# Patient Record
Sex: FEMALE | Race: BLACK OR AFRICAN AMERICAN | NOT HISPANIC OR LATINO | Employment: FULL TIME | URBAN - METROPOLITAN AREA
[De-identification: names, ages, dates, MRNs, and addresses within clinical notes are randomized per-mention and may not be internally consistent; named-entity substitution may affect disease eponyms.]

---

## 2017-03-17 ENCOUNTER — TELEPHONE (OUTPATIENT)
Dept: PSYCHIATRY | Facility: CLINIC | Age: 42
End: 2017-03-17

## 2017-03-17 NOTE — TELEPHONE ENCOUNTER
----- Message from Radha Edouard sent at 3/17/2017  9:44 AM CDT -----  Contact: Pt   Pt is calling requesting NP appt  W/ Dr Cohen  Pt  #  [phone]

## 2017-06-05 ENCOUNTER — OFFICE VISIT (OUTPATIENT)
Dept: FAMILY MEDICINE | Facility: CLINIC | Age: 42
End: 2017-06-05
Payer: COMMERCIAL

## 2017-06-05 ENCOUNTER — APPOINTMENT (OUTPATIENT)
Dept: RADIOLOGY | Facility: HOSPITAL | Age: 42
End: 2017-06-05
Attending: INTERNAL MEDICINE
Payer: COMMERCIAL

## 2017-06-05 VITALS
DIASTOLIC BLOOD PRESSURE: 94 MMHG | RESPIRATION RATE: 17 BRPM | BODY MASS INDEX: 30.11 KG/M2 | OXYGEN SATURATION: 99 % | HEART RATE: 81 BPM | WEIGHT: 187.38 LBS | TEMPERATURE: 99 F | HEIGHT: 66 IN | SYSTOLIC BLOOD PRESSURE: 144 MMHG

## 2017-06-05 DIAGNOSIS — G47.00 INSOMNIA, UNSPECIFIED TYPE: ICD-10-CM

## 2017-06-05 DIAGNOSIS — M79.642 PAIN IN BOTH HANDS: ICD-10-CM

## 2017-06-05 DIAGNOSIS — I10 HYPERTENSION, ESSENTIAL: ICD-10-CM

## 2017-06-05 DIAGNOSIS — M79.641 PAIN IN BOTH HANDS: ICD-10-CM

## 2017-06-05 DIAGNOSIS — M79.642 PAIN IN BOTH HANDS: Primary | ICD-10-CM

## 2017-06-05 DIAGNOSIS — M79.641 PAIN IN BOTH HANDS: Primary | ICD-10-CM

## 2017-06-05 PROCEDURE — 99999 PR PBB SHADOW E&M-EST. PATIENT-LVL III: CPT | Mod: PBBFAC,,, | Performed by: INTERNAL MEDICINE

## 2017-06-05 PROCEDURE — 73130 X-RAY EXAM OF HAND: CPT | Mod: 26,50,, | Performed by: RADIOLOGY

## 2017-06-05 PROCEDURE — 73130 X-RAY EXAM OF HAND: CPT | Mod: 50,TC,PN

## 2017-06-05 PROCEDURE — 99214 OFFICE O/P EST MOD 30 MIN: CPT | Mod: S$GLB,,, | Performed by: INTERNAL MEDICINE

## 2017-06-05 RX ORDER — MIRTAZAPINE 15 MG/1
15 TABLET, FILM COATED ORAL NIGHTLY
Qty: 30 TABLET | Refills: 2 | Status: SHIPPED | OUTPATIENT
Start: 2017-06-05 | End: 2018-08-17 | Stop reason: SDUPTHER

## 2017-06-05 RX ORDER — NAPROXEN 500 MG/1
500 TABLET ORAL DAILY PRN
Qty: 60 TABLET | Refills: 0 | Status: SHIPPED | OUTPATIENT
Start: 2017-06-05 | End: 2019-05-10

## 2017-06-05 NOTE — LETTER
June 5, 2017    Navjot Segovia  [address]  [place] [address]         [facility]  [address]  [place] [address]  Phone: [phone] June 5, 2017     Patient: Navjot Segovia   YOB: 1975   Date of Visit: 6/5/2017       To Whom It May Concern:    It is my medical opinion that Navjot Segovia may return to work on Tuesday June 6 2017.    If you have any questions or concerns, please don't hesitate to call.    Sincerely,        Henrik Vazquez MD

## 2017-06-05 NOTE — PROGRESS NOTES
"Subjective:       Patient ID: Navjot Segovia is a 42 y.o. female.    Chief Complaint: Arthritis (discuss ); Hand Pain (bilateral - 5th digit ); Foot Pain (left 1st & 2nd digit ); and Medication Management (Discuss Coreg )    Pain in bilateral fifth fingers.    HPI: 41 y/o with depression presents to discuss multiple chronic issues reports several month history of pain to distal fifth fingers bilaterally started on left first but now both. Worse with typing at work no parathesia no involvement of other joints no morning stiffness. She does endorse increase irritability and being more "short fused" with co workers, early a.m. Awakening, easy tearfulness. Has used effexor and trazadone in past for mood both with adverse side effects.       Review of Systems   Constitutional: Negative for activity change, appetite change, fatigue, fever and unexpected weight change.   HENT: Negative for ear pain, rhinorrhea and sore throat.    Eyes: Negative for discharge and visual disturbance.   Respiratory: Negative for chest tightness, shortness of breath and wheezing.    Cardiovascular: Negative for chest pain, palpitations and leg swelling.   Gastrointestinal: Negative for abdominal pain, constipation and diarrhea.   Endocrine: Negative for cold intolerance and heat intolerance.   Genitourinary: Negative for dysuria and hematuria.   Musculoskeletal: Positive for arthralgias. Negative for joint swelling and neck stiffness.   Skin: Negative for rash.   Neurological: Negative for dizziness, syncope, weakness and headaches.   Psychiatric/Behavioral: Positive for agitation, dysphoric mood and sleep disturbance. Negative for suicidal ideas. The patient is nervous/anxious.        Objective:     Vitals:    06/05/17 1551   BP: (!) 144/94   BP Location: Left arm   Patient Position: Sitting   BP Method: Manual   Pulse: 81   Resp: 17   Temp: 99.1 °F (37.3 °C)   TempSrc: Oral   SpO2: 99%   Weight: 85 kg (187 lb 6.3 oz)   Height: 5' 6" " (1.676 m)          Physical Exam   Constitutional: She is oriented to person, place, and time. She appears well-developed and well-nourished.   HENT:   Head: Normocephalic and atraumatic.   Eyes: Conjunctivae are normal. Pupils are equal, round, and reactive to light.   Neck: Normal range of motion. No thyromegaly present.   Cardiovascular: Normal rate and regular rhythm.  Exam reveals no gallop and no friction rub.    No murmur heard.  No LE edema   Pulmonary/Chest: Effort normal and breath sounds normal. She has no wheezes. She has no rales.   Abdominal: Soft. Bowel sounds are normal. There is no tenderness. There is no rebound and no guarding.   Musculoskeletal: Normal range of motion. She exhibits no edema or tenderness.   No palpable nodules of wrists, elbows or hands bilaterally no significant synovitis of any digits no palpable nodules at location of reported fifth finger pain   Lymphadenopathy:     She has no cervical adenopathy.   Neurological: She is alert and oriented to person, place, and time. No cranial nerve deficit.   Skin: Skin is warm and dry.   Psychiatric: She has a normal mood and affect.       Assessment:       1. Pain in both hands    2. Insomnia, unspecified type    3. Hypertension, essential        Plan:    1. Chronic suspect some component of OA, no evidence of synovitis on exam check plain films for systemic joint errosin, crp and esr to gauge inflammatory component tram and ccp to screen for SLE and RA given multiple small joint involvement     2. With depressive symptoms trial low dose remeron    3. Above goal in setting of pain will montior at close follow up

## 2017-06-06 ENCOUNTER — LAB VISIT (OUTPATIENT)
Dept: LAB | Facility: HOSPITAL | Age: 42
End: 2017-06-06
Attending: INTERNAL MEDICINE
Payer: COMMERCIAL

## 2017-06-06 DIAGNOSIS — M79.642 PAIN IN BOTH HANDS: ICD-10-CM

## 2017-06-06 DIAGNOSIS — M79.641 PAIN IN BOTH HANDS: ICD-10-CM

## 2017-06-06 DIAGNOSIS — I10 HYPERTENSION, ESSENTIAL: ICD-10-CM

## 2017-06-06 LAB
ALBUMIN SERPL BCP-MCNC: 3.9 G/DL
ALP SERPL-CCNC: 20 U/L
ALT SERPL W/O P-5'-P-CCNC: 34 U/L
ANION GAP SERPL CALC-SCNC: 7 MMOL/L
AST SERPL-CCNC: 23 U/L
BASOPHILS # BLD AUTO: 0.01 K/UL
BASOPHILS NFR BLD: 0.3 %
BILIRUB SERPL-MCNC: 0.7 MG/DL
BUN SERPL-MCNC: 11 MG/DL
CALCIUM SERPL-MCNC: 9.2 MG/DL
CCP AB SER IA-ACNC: <0.5 U/ML
CHLORIDE SERPL-SCNC: 106 MMOL/L
CO2 SERPL-SCNC: 27 MMOL/L
CREAT SERPL-MCNC: 1 MG/DL
CRP SERPL-MCNC: 0.6 MG/L
DIFFERENTIAL METHOD: ABNORMAL
EOSINOPHIL # BLD AUTO: 0.1 K/UL
EOSINOPHIL NFR BLD: 1.6 %
ERYTHROCYTE [DISTWIDTH] IN BLOOD BY AUTOMATED COUNT: 12.8 %
ERYTHROCYTE [SEDIMENTATION RATE] IN BLOOD BY WESTERGREN METHOD: 8 MM/HR
EST. GFR  (AFRICAN AMERICAN): >60 ML/MIN/1.73 M^2
EST. GFR  (NON AFRICAN AMERICAN): >60 ML/MIN/1.73 M^2
GLUCOSE SERPL-MCNC: 96 MG/DL
HCT VFR BLD AUTO: 40.3 %
HGB BLD-MCNC: 14.5 G/DL
LYMPHOCYTES # BLD AUTO: 1.5 K/UL
LYMPHOCYTES NFR BLD: 40.2 %
MCH RBC QN AUTO: 32.2 PG
MCHC RBC AUTO-ENTMCNC: 36 %
MCV RBC AUTO: 89 FL
MONOCYTES # BLD AUTO: 0.5 K/UL
MONOCYTES NFR BLD: 13.2 %
NEUTROPHILS # BLD AUTO: 1.7 K/UL
NEUTROPHILS NFR BLD: 44.4 %
PLATELET # BLD AUTO: 288 K/UL
PMV BLD AUTO: 9.8 FL
POTASSIUM SERPL-SCNC: 4 MMOL/L
PROT SERPL-MCNC: 7.2 G/DL
RBC # BLD AUTO: 4.51 M/UL
SODIUM SERPL-SCNC: 140 MMOL/L
TSH SERPL DL<=0.005 MIU/L-ACNC: 1.2 UIU/ML
URATE SERPL-MCNC: 5 MG/DL
WBC # BLD AUTO: 3.78 K/UL

## 2017-06-06 PROCEDURE — 84550 ASSAY OF BLOOD/URIC ACID: CPT

## 2017-06-06 PROCEDURE — 86038 ANTINUCLEAR ANTIBODIES: CPT

## 2017-06-06 PROCEDURE — 85651 RBC SED RATE NONAUTOMATED: CPT

## 2017-06-06 PROCEDURE — 80053 COMPREHEN METABOLIC PANEL: CPT

## 2017-06-06 PROCEDURE — 36415 COLL VENOUS BLD VENIPUNCTURE: CPT

## 2017-06-06 PROCEDURE — 84443 ASSAY THYROID STIM HORMONE: CPT

## 2017-06-06 PROCEDURE — 86200 CCP ANTIBODY: CPT

## 2017-06-06 PROCEDURE — 86140 C-REACTIVE PROTEIN: CPT

## 2017-06-06 PROCEDURE — 85025 COMPLETE CBC W/AUTO DIFF WBC: CPT

## 2017-06-07 LAB — ANA SER QL IF: NORMAL

## 2017-06-19 ENCOUNTER — OFFICE VISIT (OUTPATIENT)
Dept: FAMILY MEDICINE | Facility: CLINIC | Age: 42
End: 2017-06-19
Payer: COMMERCIAL

## 2017-06-19 VITALS
WEIGHT: 188.63 LBS | DIASTOLIC BLOOD PRESSURE: 96 MMHG | BODY MASS INDEX: 30.31 KG/M2 | TEMPERATURE: 99 F | HEART RATE: 92 BPM | SYSTOLIC BLOOD PRESSURE: 152 MMHG | RESPIRATION RATE: 20 BRPM | HEIGHT: 66 IN | OXYGEN SATURATION: 97 %

## 2017-06-19 DIAGNOSIS — I10 HYPERTENSION, ESSENTIAL: Primary | ICD-10-CM

## 2017-06-19 PROCEDURE — 99999 PR PBB SHADOW E&M-EST. PATIENT-LVL III: CPT | Mod: PBBFAC,,, | Performed by: INTERNAL MEDICINE

## 2017-06-19 PROCEDURE — 99214 OFFICE O/P EST MOD 30 MIN: CPT | Mod: S$GLB,,, | Performed by: INTERNAL MEDICINE

## 2017-06-19 RX ORDER — TRIAMTERENE AND HYDROCHLOROTHIAZIDE 37.5; 25 MG/1; MG/1
1 CAPSULE ORAL EVERY MORNING
Qty: 30 CAPSULE | Refills: 11 | Status: SHIPPED | OUTPATIENT
Start: 2017-06-19 | End: 2018-08-17

## 2017-06-19 RX ORDER — AMLODIPINE BESYLATE 10 MG/1
TABLET ORAL
Qty: 30 TABLET | Refills: 3 | Status: SHIPPED | OUTPATIENT
Start: 2017-06-19 | End: 2017-10-02 | Stop reason: SDUPTHER

## 2017-06-19 NOTE — PROGRESS NOTES
"Subjective:       Patient ID: Navjot Segovia is a 42 y.o. female.    Chief Complaint: Chronic Pain; Hand Pain; and Follow-up    F/u hand pain    HPI: 41 y/o w/ HTN here to follow up hand pain. Inflammatory markes in normal range screening for RA and SLE with CCP and OMAR negative, no significant anemai or evidence of renal disease, thyroid normal. Does report significant improvement iwtn nsaid use now taking three to four times per week. bp still abvoe goal no LE swelling no CP no orthopna or PND      Review of Systems   Constitutional: Negative for activity change, appetite change, fatigue, fever and unexpected weight change.   HENT: Negative for ear pain, rhinorrhea and sore throat.    Eyes: Negative for discharge and visual disturbance.   Respiratory: Negative for chest tightness, shortness of breath and wheezing.    Cardiovascular: Negative for chest pain, palpitations and leg swelling.   Gastrointestinal: Negative for abdominal pain, constipation and diarrhea.   Endocrine: Negative for cold intolerance and heat intolerance.   Genitourinary: Negative for dysuria and hematuria.   Musculoskeletal: Negative for joint swelling and neck stiffness.   Skin: Negative for rash.   Neurological: Negative for dizziness, syncope, weakness and headaches.   Psychiatric/Behavioral: Negative for suicidal ideas.       Objective:     Vitals:    06/19/17 1502 06/19/17 1525   BP: (!) 162/96 (!) 152/96   Pulse: 92    Resp: 20    Temp: 98.5 °F (36.9 °C)    TempSrc: Oral    SpO2: 97%    Weight: 85.6 kg (188 lb 9.7 oz)    Height: 5' 6" (1.676 m)           Physical Exam   Constitutional: She is oriented to person, place, and time. She appears well-developed and well-nourished.   HENT:   Head: Normocephalic and atraumatic.   Eyes: Conjunctivae are normal. Pupils are equal, round, and reactive to light.   Neck: Normal range of motion.   Cardiovascular: Normal rate and regular rhythm.  Exam reveals no gallop and no friction rub.    No " murmur heard.  Pulmonary/Chest: Effort normal and breath sounds normal. She has no wheezes. She has no rales.   Abdominal: Soft. Bowel sounds are normal. There is no tenderness. There is no rebound and no guarding.   Musculoskeletal: Normal range of motion. She exhibits no edema or tenderness.   Neurological: She is alert and oriented to person, place, and time. No cranial nerve deficit.   Skin: Skin is warm and dry.   Psychiatric: She has a normal mood and affect.       Assessment:       1. Hypertension, essential        Plan:    add hctz/trimeteren (K+ has been borderline low in past) BP check in two weeks

## 2017-06-29 ENCOUNTER — OFFICE VISIT (OUTPATIENT)
Dept: PSYCHIATRY | Facility: CLINIC | Age: 42
End: 2017-06-29
Payer: COMMERCIAL

## 2017-06-29 VITALS
DIASTOLIC BLOOD PRESSURE: 86 MMHG | BODY MASS INDEX: 30.39 KG/M2 | HEIGHT: 66 IN | WEIGHT: 189.06 LBS | HEART RATE: 96 BPM | SYSTOLIC BLOOD PRESSURE: 142 MMHG

## 2017-06-29 DIAGNOSIS — F33.0 MAJOR DEPRESSIVE DISORDER, RECURRENT, MILD: Primary | ICD-10-CM

## 2017-06-29 DIAGNOSIS — F41.1 GENERALIZED ANXIETY DISORDER: ICD-10-CM

## 2017-06-29 PROCEDURE — 99204 OFFICE O/P NEW MOD 45 MIN: CPT | Mod: S$GLB,,, | Performed by: PSYCHIATRY & NEUROLOGY

## 2017-06-29 PROCEDURE — 99999 PR PBB SHADOW E&M-EST. PATIENT-LVL III: CPT | Mod: PBBFAC,,, | Performed by: PSYCHIATRY & NEUROLOGY

## 2017-06-29 RX ORDER — DULOXETIN HYDROCHLORIDE 30 MG/1
30 CAPSULE, DELAYED RELEASE ORAL DAILY
Qty: 30 CAPSULE | Refills: 1 | Status: SHIPPED | OUTPATIENT
Start: 2017-06-29 | End: 2019-02-18

## 2017-06-29 RX ORDER — FLUOCINONIDE TOPICAL SOLUTION USP, 0.05% 0.5 MG/ML
SOLUTION TOPICAL
Refills: 4 | COMMUNITY
Start: 2017-06-16

## 2017-06-29 NOTE — PROGRESS NOTES
"Outpatient Psychiatry Initial Visit (MD/NP)    6/29/2017    Navjot Segovia, a 42 y.o. female, presenting for initial evaluation visit. Met with patient.    Reason for Encounter: self-referral. Patient complains of No chief complaint on file.  .    History of Present Illness: Patient Navjot Segovia presents to clinic for her initial psychiatric evaluation.  She brought herself to clinic for problems with anxiety and stress for about the past 6-7 months.  She says that she has been feeling "all weird in the head".  She did see a therapist and psychiatrist in 2013 for anxiety related chest pains after a bad divorce but never followed up.  Since then she has done well with medication and spiritual enlightenment.  Her PCP became concerned and started her on Remeron 2 weeks ago which has helped with sleep.  She describes her depression as not necessarily being depressed but can cry sometimes, especially during her menstrual cycle.  She has trouble staying asleep because she is always thinking about things.  She is eating more and gaining weight.  She has no suicidal thoughts or plans.  She describes her anxiety as being a worrisome person who worries about things that she should not have to worry about.  She fixates on things and makes them worse than what they are.  She says that she can gravitate towards negativity.  "My brain is going."  She has no history of panic attacks.  She has no history of vitor or psychosis.  Incidentally, she has also noted she strains arthritic pains in her fingers and toes over the past couple of months.  Medical workup thus far has been negative.  Most of her stress comes from the workplace.    Previously she has been on trazodone (nightmares), Effexor XR (nausea and sleepiness), Klonopin (worked), Prozac (did not like).    Review Of Systems:     GENERAL:  No weight gain or loss  HEAD:  No headaches  CHEST:  No shortness of breath, hyperventilation or cough  CARDIOVASCULAR:  No " tachycardia or chest pain  ABDOMEN:  No nausea, vomiting, pain, constipation or diarrhea  MUSCULOSKELETAL:  No pain or stiffness of the joints  NEUROLOGIC:  No weakness, sensory changes, seizures, confusion, memory loss, tremor or other abnormal movements  Pertinent items are noted in HPI.      Current Evaluation:     Nutritional Screening: Considering the patient's height and weight, medications, medical history and preferences, should a referral be made to the dietitian? no    Constitutional  Vitals:  Most recent vital signs, dated less than 90 days prior to this appointment, were reviewed.  There were no vitals filed for this visit.     General:  unremarkable, age appropriate     Musculoskeletal  Muscle Strength/Tone:  no tremor, no tic   Gait & Station:  non-ataxic     Psychiatric  Speech:  no latency; no press   Mood & Affect:  anxious, dysthymic  congruent and appropriate   Thought Process:  normal and logical   Associations:  intact   Thought Content:  normal, no suicidality, no homicidality, delusions, or paranoia   Insight:  limited awareness of illness   Judgement: behavior is adequate to circumstances   Orientation:  person, place, situation, time/date   Memory: intact for content of interview   Language: grossly intact   Attention Span & Concentration:  able to focus   Fund of Knowledge:  intact and appropriate to age and level of education       Relevant Elements of Neurological Exam: normal gait    Functioning in Relationships:  Spouse/partner: Good relationship with fiancé  Peers: Good  Employers: Good    Laboratory Data  Lab Visit on 06/06/2017   Component Date Value Ref Range Status    WBC 06/06/2017 3.78* 3.90 - 12.70 K/uL Final    RBC 06/06/2017 4.51  4.00 - 5.40 M/uL Final    Hemoglobin 06/06/2017 14.5  12.0 - 16.0 g/dL Final    Hematocrit 06/06/2017 40.3  37.0 - 48.5 % Final    MCV 06/06/2017 89  82 - 98 fL Final    MCH 06/06/2017 32.2* 27.0 - 31.0 pg Final    MCHC 06/06/2017 36.0  32.0 -  36.0 % Final    RDW 06/06/2017 12.8  11.5 - 14.5 % Final    Platelets 06/06/2017 288  150 - 350 K/uL Final    MPV 06/06/2017 9.8  9.2 - 12.9 fL Final    Gran # 06/06/2017 1.7* 1.8 - 7.7 K/uL Final    Lymph # 06/06/2017 1.5  1.0 - 4.8 K/uL Final    Mono # 06/06/2017 0.5  0.3 - 1.0 K/uL Final    Eos # 06/06/2017 0.1  0.0 - 0.5 K/uL Final    Baso # 06/06/2017 0.01  0.00 - 0.20 K/uL Final    Gran% 06/06/2017 44.4  38.0 - 73.0 % Final    Lymph% 06/06/2017 40.2  18.0 - 48.0 % Final    Mono% 06/06/2017 13.2  4.0 - 15.0 % Final    Eosinophil% 06/06/2017 1.6  0.0 - 8.0 % Final    Basophil% 06/06/2017 0.3  0.0 - 1.9 % Final    Differential Method 06/06/2017 Automated   Final    Sodium 06/06/2017 140  136 - 145 mmol/L Final    Potassium 06/06/2017 4.0  3.5 - 5.1 mmol/L Final    Chloride 06/06/2017 106  95 - 110 mmol/L Final    CO2 06/06/2017 27  23 - 29 mmol/L Final    Glucose 06/06/2017 96  70 - 110 mg/dL Final    BUN, Bld 06/06/2017 11  6 - 20 mg/dL Final    Creatinine 06/06/2017 1.0  0.5 - 1.4 mg/dL Final    Calcium 06/06/2017 9.2  8.7 - 10.5 mg/dL Final    Total Protein 06/06/2017 7.2  6.0 - 8.4 g/dL Final    Albumin 06/06/2017 3.9  3.5 - 5.2 g/dL Final    Total Bilirubin 06/06/2017 0.7  0.1 - 1.0 mg/dL Final    Alkaline Phosphatase 06/06/2017 20* 55 - 135 U/L Final    AST 06/06/2017 23  10 - 40 U/L Final    ALT 06/06/2017 34  10 - 44 U/L Final    Anion Gap 06/06/2017 7* 8 - 16 mmol/L Final    eGFR if African American 06/06/2017 >60  >60 mL/min/1.73 m^2 Final    eGFR if non African American 06/06/2017 >60  >60 mL/min/1.73 m^2 Final    TSH 06/06/2017 1.197  0.400 - 4.000 uIU/mL Final    CRP 06/06/2017 0.6  0.0 - 8.2 mg/L Final    Sed Rate 06/06/2017 8  0 - 20 mm/Hr Final    CCP Antibodies 06/06/2017 <0.5  <5.0 U/mL Final    OMAR Screen 06/07/2017 Negative <1:160  Negative <1:160 Final    Uric Acid 06/06/2017 5.0  2.4 - 5.7 mg/dL Final         Medications  Outpatient Encounter  Prescriptions as of 6/29/2017   Medication Sig Dispense Refill    amlodipine (NORVASC) 10 MG tablet TAKE 1 TABLET (10 MG TOTAL) BY MOUTH ONCE DAILY. 30 tablet 3    carvedilol (COREG) 12.5 MG tablet Take 1 tablet (12.5 mg total) by mouth 2 (two) times daily with meals. (Patient taking differently: Take 12.5 mg by mouth every evening. ) 60 tablet 11    mirtazapine (REMERON) 15 MG tablet Take 1 tablet (15 mg total) by mouth every evening. 30 tablet 2    multivitamin with minerals tablet Take 1 tablet by mouth once daily.      naproxen (NAPROSYN) 500 MG tablet Take 1 tablet (500 mg total) by mouth daily as needed. 60 tablet 0    triamterene-hydrochlorothiazide 37.5-25 mg (DYAZIDE) 37.5-25 mg per capsule Take 1 capsule by mouth every morning. 30 capsule 11    valsartan (DIOVAN) 320 MG tablet Take 1 tablet (320 mg total) by mouth once daily. 30 tablet 5     No facility-administered encounter medications on file as of 6/29/2017.            Assessment - Diagnosis - Goals:     Impression: We will continue pharmacological intervention and adjunctive therapy.       ICD-10-CM ICD-9-CM   1. Major depressive disorder, recurrent, mild F33.0 296.31   2. Generalized anxiety disorder F41.1 300.02       Strengths and Liabilities: Strength: Patient is motivated for change., Liability: Patient lacks coping skills.    Treatment Goals:  Specify outcomes written in observable, behavioral terms:   Anxiety: reducing negative automatic thoughts and reducing physical symptoms of anxiety  Depression: increasing energy, increasing interest in usual activities, increasing motivation and reducing negative automatic thoughts    Treatment Plan/Recommendations:   · Medication Management: Continue current medications. The risks and benefits of medication were discussed with the patient.  · The treatment plan and follow up plan were reviewed with the patient.  1.  Continue Remeron 7.5 mg nightly targeting sleep, depression, anxiety.  Warned of  risk of vitor, suicidality, serotonin syndrome, weight gain, oversedation.  2.  Start Cymbalta 30 mg by mouth daily.  Targeting depression, anxiety, chronic pains.  Warned of risk of vitor, suicidality, serotonin syndrome.  3.  If she continues to gain weight or considered weight gain from Remeron, will change to doxepin or amitriptyline.  4.  Asked patient to get set up with EAP therapist.    Return to Clinic: 6 weeks, as needed    Counseling time: 40 minutes  Total time: 70 minutes  Consulting clinician was informed of the encounter and consult note.

## 2017-06-29 NOTE — PATIENT INSTRUCTIONS
[facility] - DEPARTMENT OF PSYCHIATRY   NEW PATIENT ORIENTATION INFORMATION  OUTPATIENT SERVICES COUNSELING CONTRACT    We appreciate the opportunity to participate in your medical care and hope the following protocols will make it easier for you to receive quality treatment in our department.    1. PUNCTUALITY: Your appointment is scheduled for a fixed amount of time reserved especially for you.  To get the benefit of your appointment, please arrive early enough to allow time for parking and registration.  If you are late for your appointment, your clinician is not able to offer additional time.  Please make every effort to be on time.      2. PAYMENT FOR SERVICES:   Payments are expected at the time of service.  Please contact Deandra Lazo at [phone] if you need to resolve issues involving your account at [facility] or to set up a payment plan.    3. CANCELLATION / MISSED APPOINTMENTS:   In order to receive quality care, all appointments must be kept.  Appointment may be cancelled, ONLY by talking with an  at phone number [phone], between 8:00 a.m. and 5:00 p.m., Monday through Friday, at least 24 hours before your appointment time.  Your clinician reserves this time specifically for you, and if you will be unable to use it, it is necessary that you cancel in a timely manner.  If you do not give at least 24-hour notice of cancellation a full fee will be assessed.  Please note that insurance does not cover no-show charges, so you will be billed directly.  If you are consistently late, cancel, or do not show for your appointments, our department reserves the right to terminate treatment    4. CALLING THE DEPARTMENT:   MESSAGES, SCHEDULE OR CANCEL APPOINTMENTS- In general you can reach the department by calling [phone], between 8:00 a.m. and 5:00 p.m., Monday through Friday, to schedule or cancel appointments or leave a message for your clinician.  It is advisable  to schedule your visits far in advance to obtain the most convenient times for your appointments.   AFTER HOURS, WEEKEND OR HOLIDAYS- For urgent questions after hours, weekends and holidays, calling the department number [phone] will connect you to the nursing staff on the Psychiatry Inpatient Unit.  The nursing staff will speak with you and contact the On Call psychiatrist if necessary.   EMERGENCY-  In case of a crisis when there is a concern of harm to self or others, call 911 or the office [phone] between 8:00 a.m. and 5:00 p.m., Monday through Friday.  After hours, weekends or holidays, please call 911 or go to the Emergency Department where you can be thoroughly evaluated by the On Call psychiatrist.  If possible, contact the psychiatrist on call at [phone] prior to leaving for the [facility] Emergency Department.    5. TEAM APPROACH:  Most patients receive therapy through our team system.  In the team system, your primary therapist will be a , psychologist, psychiatry resident or psychiatrist.  If your therapist is a , psychologist or psychiatry resident, please contact your primary therapist first in matters other than medications or acute medical problems.    6. PRESCRIPTION REFILLS:  Prescription refills must be done at your physician office visit.  You will be given a sufficient number of refills to last one extra month beyond your next appointment.  No additional refills will be approved beyond the original treatment plan.  After hours, sufficient medication may be approved to last until the next scheduled appointment. After hours requests for refills on controlled substances may be declined by the psychiatrist on call, as he or she may not be familiar with your case  Please work closely with your doctor so that you have sufficient medication until your next appointment.  Again, please note that no additional prescriptions will be approved per patient  request over the phone.   No additional refills will be approved beyond the original treatment plan.   In certain exceptional situations, a phone consult appointment may be arranged, with appropriate charge, for the review and approval of prescription refills to last until the next scheduled appointment.    7. FOLLOW UP APPOINTMENTS:  Follow-up appointments can be made in person at the Psychiatry Appointment Desk, [place] [address], or by calling [phone], from 8am to 5pm, between Monday and Friday.  It is advisable to schedule your office visits far enough in advance to obtain the most convenient times for your appointments.    8. PAYMENT FOR SERVICES:   Payments are expected at the time of service.  Please contact Deandra Lazo at [phone] if you need to resolve issues involving your account at [facility] or to set up a payment plan.    Revised Feb. 23, 2010 - F/OA1/Newpatient                              You have been provided with a certain amount of medication with a specified number of refills.  Please follow up within an adequate time before you run out of medications.    REFILLS FOR CONTROLLED SUBSTANCES WILL NOT BE GIVEN WITHOUT AN APPOINTMENT.  I will not honor or fill automated refill requests from pharmacies.  You must come in for an appointment to get refills.    Please book your next appointment for myself or therapist by phone: [phone].        PLEASE BE AT LEAST 15 MINUTES EARLY FOR YOUR NEXT APPOINTMENT.  PLEASE, DO NOT BE LATE OR YOU WILL BE TURNED AWAY AND ASKED TO RESCHEDULE.  YOU MUST ALLOW TIME FOR CHECK-IN AS WELL AS GET YOUR VITAL SIGNS AND GO OVER YOUR MEDICATIONS.  Tardiness can affect and is not fair to the patients who present after you and are on time for their appointments.  It causes a delay in the appointments for patients and staff.  THERE IS A POSSIBILITY THAT YOU WILL BE CHARGED FOR THE APPOINTMENT TIME PERSONALLY AND IT WILL NOT GO TO YOUR INSURANCE.  YOU MAY ALSO BE  "DISCHARGED FROM CLINIC with multiple "No Show" appointments.       -----------------------------------------------------------------------------------------------------------------  IF YOU FEEL SUICIDAL OR HAVING THOUGHTS OR PLANS TO HURT YOURSELF OR OTHERS, CALL 911 OR REPORT TO THE NEAREST EMERGENCY ROOM.  YOU CAN ALSO ACCESS ONE OF THE FOLLOWING HOTLINES:    ANDIE CARDOZO  [facility] Mobile Crisis  [phone]    [facility] Crisis Line   [phone]     [facility]  24 hours / 7 days   [phone])       "

## 2017-08-22 DIAGNOSIS — I10 HYPERTENSION, ESSENTIAL: ICD-10-CM

## 2017-08-22 RX ORDER — VALSARTAN 320 MG/1
320 TABLET ORAL DAILY
Qty: 30 TABLET | Refills: 2 | Status: SHIPPED | OUTPATIENT
Start: 2017-08-22 | End: 2017-12-05 | Stop reason: SDUPTHER

## 2017-09-04 ENCOUNTER — HOSPITAL ENCOUNTER (EMERGENCY)
Facility: OTHER | Age: 42
Discharge: HOME | End: 2017-09-04
Attending: EMERGENCY MEDICINE
Payer: COMMERCIAL

## 2017-09-04 VITALS
WEIGHT: 180 LBS | DIASTOLIC BLOOD PRESSURE: 99 MMHG | RESPIRATION RATE: 16 BRPM | BODY MASS INDEX: 28.93 KG/M2 | OXYGEN SATURATION: 99 % | TEMPERATURE: 98 F | SYSTOLIC BLOOD PRESSURE: 176 MMHG | HEART RATE: 85 BPM | HEIGHT: 66 IN

## 2017-09-04 DIAGNOSIS — R07.9 CHEST PAIN: ICD-10-CM

## 2017-09-04 DIAGNOSIS — E87.6 HYPOKALEMIA: ICD-10-CM

## 2017-09-04 DIAGNOSIS — R07.9 CHEST PAIN, UNSPECIFIED TYPE: Primary | ICD-10-CM

## 2017-09-04 LAB
ALBUMIN SERPL-MCNC: 3.6 G/DL (ref 3.3–5.5)
ALP SERPL-CCNC: 30 U/L (ref 42–141)
B-HCG UR QL: NEGATIVE
BILIRUB SERPL-MCNC: 0.8 MG/DL (ref 0.2–1.6)
BUN SERPL-MCNC: 11 MG/DL (ref 7–22)
CALCIUM SERPL-MCNC: 9.2 MG/DL (ref 8–10.3)
CHLORIDE SERPL-SCNC: 99 MMOL/L (ref 98–108)
CREAT SERPL-MCNC: 0.9 MG/DL (ref 0.6–1.2)
CTP QC/QA: YES
GLUCOSE SERPL-MCNC: 132 MG/DL (ref 73–118)
POC ALT (SGPT): 39 U/L (ref 10–47)
POC AST (SGOT): 32 U/L (ref 11–38)
POC B-TYPE NATRIURETIC PEPTIDE: <5 PG/ML (ref 0–100)
POC CARDIAC TROPONIN I: 0 NG/ML
POC D-DI: 103 NG/ML (ref 0–450)
POC TCO2: 26 MMOL/L (ref 18–33)
POTASSIUM BLD-SCNC: 2.7 MMOL/L (ref 3.6–5.1)
PROTEIN, POC: 6.6 G/DL (ref 6.4–8.1)
SAMPLE: NORMAL
SODIUM BLD-SCNC: 135 MMOL/L (ref 128–145)

## 2017-09-04 PROCEDURE — 99284 EMERGENCY DEPT VISIT MOD MDM: CPT | Mod: 25

## 2017-09-04 PROCEDURE — 85379 FIBRIN DEGRADATION QUANT: CPT

## 2017-09-04 PROCEDURE — 80053 COMPREHEN METABOLIC PANEL: CPT

## 2017-09-04 PROCEDURE — 81025 URINE PREGNANCY TEST: CPT | Performed by: EMERGENCY MEDICINE

## 2017-09-04 PROCEDURE — 83880 ASSAY OF NATRIURETIC PEPTIDE: CPT

## 2017-09-04 PROCEDURE — 84484 ASSAY OF TROPONIN QUANT: CPT

## 2017-09-04 PROCEDURE — 85025 COMPLETE CBC W/AUTO DIFF WBC: CPT

## 2017-09-04 PROCEDURE — 25000003 PHARM REV CODE 250: Performed by: EMERGENCY MEDICINE

## 2017-09-04 RX ORDER — POTASSIUM CHLORIDE 20 MEQ/1
60 TABLET, EXTENDED RELEASE ORAL
Status: COMPLETED | OUTPATIENT
Start: 2017-09-04 | End: 2017-09-04

## 2017-09-04 RX ORDER — POTASSIUM CHLORIDE 20 MEQ/1
20 TABLET, EXTENDED RELEASE ORAL DAILY
Qty: 5 TABLET | Refills: 0 | Status: SHIPPED | OUTPATIENT
Start: 2017-09-04 | End: 2017-09-09

## 2017-09-04 RX ADMIN — POTASSIUM CHLORIDE 60 MEQ: 20 TABLET, EXTENDED RELEASE ORAL at 09:09

## 2017-09-05 NOTE — ED PROVIDER NOTES
Encounter Date: 2017       History     Chief Complaint   Patient presents with    Chest Pain     pt c/o intermitted sharp CP x last night, pt denies n/v/d/ SOB       Chest Pain   The current episode started prior to awakening (around midnight last night). Duration of episode(s) is 1 second. Chest pain occurs intermittently. The chest pain is improving. Associated with: no specific association. The quality of the pain is described as sharp. The pain does not radiate. Exacerbated by: no exacerbating factors. Pertinent negatives for primary symptoms include no fever, no fatigue, no syncope, no shortness of breath, no cough, no wheezing, no palpitations, no abdominal pain, no nausea, no vomiting, no dizziness and no altered mental status.   Associated symptoms include paroxysmal nocturnal dyspnea (chronic - history of ROSALINE).   Pertinent negatives for associated symptoms include no diaphoresis, no lower extremity edema, no near-syncope, no numbness, no orthopnea and no weakness. She tried nothing for the symptoms.     Review of patient's allergies indicates:   Allergen Reactions    Ace inhibitors      Other reaction(s): cough     Past Medical History:   Diagnosis Date    History of acne     Hx of psychiatric care     Hyperlipidemia     Hypertension     Keloid cicatrix     Psychiatric problem     Therapy      Past Surgical History:   Procedure Laterality Date     SECTION, CLASSIC      x 2    Laparoscopic bilateral tubal ligation.      TUBAL LIGATION       Family History   Problem Relation Age of Onset    Hypertension Father     Heart disease Father 46    Aortic aneurysm Father      abdominal    Hypertension Mother     Depression Sister     Schizophrenia Maternal Uncle     Schizophrenia Cousin     Breast cancer Neg Hx     Ovarian cancer Neg Hx      Social History   Substance Use Topics    Smoking status: Never Smoker    Smokeless tobacco: Never Used    Alcohol use Yes      Comment:  Social; rare use     Review of Systems   Constitutional: Negative for appetite change, chills, diaphoresis, fatigue and fever.   HENT: Negative.    Eyes: Negative.    Respiratory: Negative for cough, shortness of breath, wheezing and stridor.    Cardiovascular: Positive for chest pain. Negative for palpitations, orthopnea, syncope and near-syncope.   Gastrointestinal: Negative for abdominal pain, nausea and vomiting.   Genitourinary: Negative for dysuria and hematuria.   Musculoskeletal: Negative.    Skin: Negative.    Neurological: Negative for dizziness, weakness, numbness and headaches.   Psychiatric/Behavioral: Negative.    All other systems reviewed and are negative.      Physical Exam     Initial Vitals [09/04/17 1923]   BP Pulse Resp Temp SpO2   (!) 179/98 88 18 97.9 °F (36.6 °C) 98 %      MAP       125         Physical Exam    Nursing note and vitals reviewed.  Constitutional: She appears well-developed and well-nourished. She is not diaphoretic. No distress.   HENT:   Head: Normocephalic and atraumatic.   Mouth/Throat: Oropharynx is clear and moist. No oropharyngeal exudate.   Eyes: EOM are normal. Pupils are equal, round, and reactive to light.   Neck: Normal range of motion. Neck supple.   Cardiovascular: Normal rate, regular rhythm and intact distal pulses.   No murmur heard.  Pulmonary/Chest: Breath sounds normal. No stridor. No respiratory distress.   Abdominal: Soft. Bowel sounds are normal. There is no tenderness.   Musculoskeletal: Normal range of motion. She exhibits no edema or tenderness.   Neurological: She is alert and oriented to person, place, and time. She has normal strength. No cranial nerve deficit.   Skin: Skin is warm and dry. Capillary refill takes less than 2 seconds. No erythema. No pallor.   Psychiatric: She has a normal mood and affect.         ED Course   Procedures  Labs Reviewed   POCT CMP - Abnormal; Notable for the following:        Result Value    Alkaline Phosphatase, POC 30  (*)     POC Glucose 132 (*)     POC Potassium 2.7 (*)     All other components within normal limits   POCT B-TYPE NATRIURETIC PEPTIDE (BNP) - Abnormal; Notable for the following:     POC B-Type Natriuretic Peptide <5.0 (*)     All other components within normal limits   TROPONIN ISTAT   POCT URINE PREGNANCY   POCT CBC   POCT CMP   POCT TROPONIN   POCT B-TYPE NATRIURETIC PEPTIDE (BNP)   POCT D DIMER   POCT D DIMER     EKG Readings: (Independently Interpreted)   Initial Reading: No STEMI. Rhythm: Normal Sinus Rhythm. Heart Rate: 79. Ectopy: No Ectopy. Conduction: Normal. T Waves Flipped: III. Q Waves: V1, V2 and V3.          Medical Decision Making:   Differential Diagnosis:   Pneumonia, pneumothorax, pleurisy, anxiety and others.      Clinical Tests:   Lab Tests: Ordered and Reviewed       <> Summary of Lab: H&H 13.8 and 38.9  Radiological Study: Ordered and Reviewed  Medical Tests: Ordered and Reviewed                   ED Course      Labs Reviewed  Admission on 09/04/2017   Component Date Value Ref Range Status    POC Preg Test, Ur 09/04/2017 Negative  Negative Final     Acceptable 09/04/2017 Yes   Final    Albumin, POC 09/04/2017 3.6  3.3 - 5.5 g/dL Final    Alkaline Phosphatase, POC 09/04/2017 30* 42 - 141 U/L Final    ALT (SGPT), POC 09/04/2017 39  10 - 47 U/L Final    AST (SGOT), POC 09/04/2017 32  11 - 38 U/L Final    POC BUN 09/04/2017 11  7 - 22 mg/dL Final    Calcium, POC 09/04/2017 9.2  8.0 - 10.3 mg/dL Final    POC Chloride 09/04/2017 99  98 - 108 mmol/L Final    POC Creatinine 09/04/2017 0.9  0.6 - 1.2 mg/dL Final    POC Glucose 09/04/2017 132* 73 - 118 mg/dL Final    POC Potassium 09/04/2017 2.7* 3.6 - 5.1 mmol/L Final    POC Sodium 09/04/2017 135  128 - 145 mmol/L Final    Bilirubin 09/04/2017 0.8  0.2 - 1.6 mg/dL Final    POC TCO2 09/04/2017 26  18 - 33 mmol/L Final    Protein 09/04/2017 6.6  6.4 - 8.1 g/dL Final    POC D-DI 09/04/2017 103  0.0 - 450.0 ng/mL Final     POC B-Type Natriuretic Peptide 09/04/2017 <5.0* 0.0 - 100.0 pg/mL Final    POC Cardiac Troponin I 09/04/2017 0.00  <0.09 ng/mL Final    Sample 09/04/2017 ALEX   Final        Imaging Reviewed    Imaging Results          X-Ray Chest PA And Lateral (Final result)  Result time 09/04/17 20:39:07    Final result by Jessica Cruz MD (09/04/17 20:39:07)                 Impression:      No acute cardiopulmonary process identified.      Electronically signed by: JESSICA CRUZ MD  Date:     09/04/17  Time:    20:39              Narrative:    Chest PA and lateral.  Comparison: 8/2014.    Mediastinal structures are midline.  Cardiac silhouette is normal in size.  Lungs are symmetrically expanded.  No evidence of focal consolidative process, pneumothorax, or significant effusion.  Bones appear intact.  No free air visualized beneath the diaphragm.                              Medications given in ED    Medications   potassium chloride SA CR tablet 60 mEq (60 mEq Oral Given 9/4/17 2116)     Discharge Medications     Medication List with Changes/Refills   New Medications    POTASSIUM CHLORIDE SA (K-DUR,KLOR-CON) 20 MEQ TABLET    Take 1 tablet (20 mEq total) by mouth once daily.   Current Medications    AMLODIPINE (NORVASC) 10 MG TABLET    TAKE 1 TABLET (10 MG TOTAL) BY MOUTH ONCE DAILY.    CARVEDILOL (COREG) 12.5 MG TABLET    Take 1 tablet (12.5 mg total) by mouth 2 (two) times daily with meals.    DULOXETINE (CYMBALTA) 30 MG CAPSULE    Take 1 capsule (30 mg total) by mouth once daily.    FLUOCINONIDE (LIDEX) 0.05 % EXTERNAL SOLUTION    APPLY TO AFFECTED AREA ONCE A DAY    MIRTAZAPINE (REMERON) 15 MG TABLET    Take 1 tablet (15 mg total) by mouth every evening.    MULTIVITAMIN WITH MINERALS TABLET    Take 1 tablet by mouth once daily.    NAPROXEN (NAPROSYN) 500 MG TABLET    Take 1 tablet (500 mg total) by mouth daily as needed.    TRIAMTERENE-HYDROCHLOROTHIAZIDE 37.5-25 MG (DYAZIDE) 37.5-25 MG PER CAPSULE    Take 1 capsule by  mouth every morning.    VALSARTAN (DIOVAN) 320 MG TABLET    TAKE 1 TABLET (320 MG TOTAL) BY MOUTH ONCE DAILY.             Patient discharged to home in stable condition with instructions to:   1. Please take all meds as prescribed.  2. Follow-up with your primary care doctor   3. Return precautions discussed and patient and/or family/caretaker understands to return to the emergency room for any concerns including worsening of your current symptoms, fever, chills, night sweats, worsening pain, chest pain, shortness of breath, nausea, vomiting, diarrhea, bleeding, headache, difficulty talking, visual disturbances, weakness, numbness or any other acute concerns      Note was created using voice recognition software. Note may have occasional typographical errors that may not have been identified and edited despite good panfilo initial review prior to signing.    Clinical Impression:   The primary encounter diagnosis was Chest pain, unspecified type. A diagnosis of Chest pain was also pertinent to this visit.                           Emmanuel Hopper MD  10/02/17 0440

## 2017-09-05 NOTE — ED NOTES
Pt supine in bed, alert and oriented, reports chest pain that is intermittent and is stationary to the left upper chest, denies n/v

## 2017-10-02 ENCOUNTER — OFFICE VISIT (OUTPATIENT)
Dept: FAMILY MEDICINE | Facility: CLINIC | Age: 42
End: 2017-10-02
Payer: COMMERCIAL

## 2017-10-02 VITALS
DIASTOLIC BLOOD PRESSURE: 84 MMHG | BODY MASS INDEX: 31.18 KG/M2 | SYSTOLIC BLOOD PRESSURE: 130 MMHG | WEIGHT: 194 LBS | TEMPERATURE: 99 F | OXYGEN SATURATION: 97 % | HEIGHT: 66 IN | HEART RATE: 88 BPM | RESPIRATION RATE: 17 BRPM

## 2017-10-02 DIAGNOSIS — I10 HYPERTENSION, ESSENTIAL: ICD-10-CM

## 2017-10-02 DIAGNOSIS — G47.33 OSA (OBSTRUCTIVE SLEEP APNEA): Primary | ICD-10-CM

## 2017-10-02 PROCEDURE — 3075F SYST BP GE 130 - 139MM HG: CPT | Mod: S$GLB,,, | Performed by: INTERNAL MEDICINE

## 2017-10-02 PROCEDURE — 3008F BODY MASS INDEX DOCD: CPT | Mod: S$GLB,,, | Performed by: INTERNAL MEDICINE

## 2017-10-02 PROCEDURE — 99999 PR PBB SHADOW E&M-EST. PATIENT-LVL IV: CPT | Mod: PBBFAC,,, | Performed by: INTERNAL MEDICINE

## 2017-10-02 PROCEDURE — 99214 OFFICE O/P EST MOD 30 MIN: CPT | Mod: S$GLB,,, | Performed by: INTERNAL MEDICINE

## 2017-10-02 PROCEDURE — 3079F DIAST BP 80-89 MM HG: CPT | Mod: S$GLB,,, | Performed by: INTERNAL MEDICINE

## 2017-10-02 RX ORDER — AMLODIPINE BESYLATE 10 MG/1
TABLET ORAL
Qty: 30 TABLET | Refills: 3 | Status: SHIPPED | OUTPATIENT
Start: 2017-10-02 | End: 2018-08-17 | Stop reason: SINTOL

## 2017-10-02 NOTE — PROGRESS NOTES
"Subjective:       Patient ID: Navjot Segovia is a 42 y.o. female.    Chief Complaint: Chest Pain (ED follow-up) and Sleep Apnea    F/u breathing problems/blood pressure    HPI: 41 y/o with HTN (on three agents) presents for follow up after ED visit for "chest pain" describes sensation of awakening from sleep gasping for breath. No symptoms when lying flat she did have sleep study three eyaras ago which showed elevated AHI, but was unable to sleep well enough to get titration study. +heroic snoring with witnessed apnea, + daytime somulence. Sister and mom with ROSALINE and use CPAP. She has never had CPAP for home use. Denies LE swelling no exertional symptoms. TSH was normal in JUne 2017      Review of Systems   Constitutional: Negative for activity change, appetite change, fatigue, fever and unexpected weight change.   HENT: Negative for ear pain, rhinorrhea and sore throat.    Eyes: Negative for discharge and visual disturbance.   Respiratory: Negative for chest tightness, shortness of breath and wheezing.    Cardiovascular: Negative for chest pain, palpitations and leg swelling.   Gastrointestinal: Negative for abdominal pain, constipation and diarrhea.   Endocrine: Negative for cold intolerance and heat intolerance.   Genitourinary: Negative for dysuria and hematuria.   Musculoskeletal: Negative for joint swelling and neck stiffness.   Skin: Negative for rash.   Neurological: Negative for dizziness, syncope, weakness and headaches.   Psychiatric/Behavioral: Negative for suicidal ideas.       Objective:     Vitals:    10/02/17 1001   BP: 130/84   BP Location: Right arm   Patient Position: Sitting   BP Method: Medium (Manual)   Pulse: 88   Resp: 17   Temp: 98.6 °F (37 °C)   TempSrc: Oral   SpO2: 97%   Weight: 88 kg (194 lb 0.1 oz)   Height: 5' 6" (1.676 m)          Physical Exam   Constitutional: She is oriented to person, place, and time. She appears well-developed and well-nourished.   HENT:   Head: " Normocephalic and atraumatic.   Crowded upper airway   Eyes: Conjunctivae are normal. Pupils are equal, round, and reactive to light.   Neck: Normal range of motion.   Cardiovascular: Normal rate and regular rhythm.  Exam reveals no gallop and no friction rub.    No murmur heard.  No LE edema   Pulmonary/Chest: Effort normal and breath sounds normal. She has no wheezes. She has no rales.   Abdominal: Soft. Bowel sounds are normal. There is no tenderness. There is no rebound and no guarding.   Musculoskeletal: Normal range of motion. She exhibits no edema or tenderness.   Neurological: She is alert and oriented to person, place, and time. No cranial nerve deficit.   Skin: Skin is warm and dry.   Psychiatric: She has a normal mood and affect.       Assessment:       1. ROSALINE (obstructive sleep apnea)    2. Hypertension, essential        Plan:    1. Needs titration study to sleep medicine to assist (?home study?)    2. Just at goal discussed with ROSALINE treatment may be canidate to wean off some medications.     F/u two months

## 2017-10-03 ENCOUNTER — TELEPHONE (OUTPATIENT)
Dept: FAMILY MEDICINE | Facility: CLINIC | Age: 42
End: 2017-10-03

## 2017-10-11 ENCOUNTER — HOSPITAL ENCOUNTER (OUTPATIENT)
Dept: RADIOLOGY | Facility: HOSPITAL | Age: 42
Discharge: HOME | End: 2017-10-11
Attending: INTERNAL MEDICINE
Payer: COMMERCIAL

## 2017-10-11 VITALS — WEIGHT: 194 LBS | HEIGHT: 66 IN | BODY MASS INDEX: 31.18 KG/M2

## 2017-10-11 DIAGNOSIS — Z12.39 SCREENING FOR BREAST CANCER: ICD-10-CM

## 2017-10-11 PROCEDURE — 77067 SCR MAMMO BI INCL CAD: CPT | Mod: TC

## 2017-10-11 PROCEDURE — 77067 SCR MAMMO BI INCL CAD: CPT | Mod: 26,,, | Performed by: RADIOLOGY

## 2017-10-11 PROCEDURE — 77063 BREAST TOMOSYNTHESIS BI: CPT | Mod: 26,,, | Performed by: RADIOLOGY

## 2017-10-12 ENCOUNTER — OFFICE VISIT (OUTPATIENT)
Dept: SLEEP MEDICINE | Facility: CLINIC | Age: 42
End: 2017-10-12
Payer: COMMERCIAL

## 2017-10-12 VITALS
WEIGHT: 196.63 LBS | DIASTOLIC BLOOD PRESSURE: 93 MMHG | OXYGEN SATURATION: 97 % | BODY MASS INDEX: 31.6 KG/M2 | HEART RATE: 97 BPM | HEIGHT: 66 IN | SYSTOLIC BLOOD PRESSURE: 146 MMHG

## 2017-10-12 DIAGNOSIS — E66.9 CLASS 1 OBESITY WITH BODY MASS INDEX (BMI) OF 31.0 TO 31.9 IN ADULT, UNSPECIFIED OBESITY TYPE, UNSPECIFIED WHETHER SERIOUS COMORBIDITY PRESENT: ICD-10-CM

## 2017-10-12 DIAGNOSIS — R09.81 NASAL CONGESTION: ICD-10-CM

## 2017-10-12 DIAGNOSIS — G47.33 OSA (OBSTRUCTIVE SLEEP APNEA): Primary | ICD-10-CM

## 2017-10-12 PROCEDURE — 99244 OFF/OP CNSLTJ NEW/EST MOD 40: CPT | Mod: S$GLB,,, | Performed by: INTERNAL MEDICINE

## 2017-10-12 PROCEDURE — 99999 PR PBB SHADOW E&M-EST. PATIENT-LVL III: CPT | Mod: PBBFAC,,, | Performed by: INTERNAL MEDICINE

## 2017-10-12 NOTE — LETTER
October 12, 2017      Henrik Vazquez MD  [address] [facility] [address]  [place] [address]           [facility]  [address]  [place] [address]  Phone: [phone]  Fax: [phone]          Patient: Navjot Segovia   MR Number: [id number]   YOB: 1975   Date of Visit: 10/12/2017       Dear Dr. Henrik Vazquez:    Thank you for referring Navjot Segovia to me for evaluation. Attached you will find relevant portions of my assessment and plan of care.    If you have questions, please do not hesitate to call me. I look forward to following Navjot Segovia along with you.    Sincerely,    Manpreet Escalante MD    Enclosure  CC:  No Recipients    If you would like to receive this communication electronically, please contact [email or web address] or [phone] to request more information on [employer] Link access.    For providers and/or their staff who would like to refer a patient to [facility], please contact us through our one-stop-shop provider referral line, [facility], at [phone].    If you feel you have received this communication in error or would no longer like to receive these types of communications, please e-mail [email or web address]

## 2017-10-12 NOTE — PATIENT INSTRUCTIONS
1.  NeilMed Sinus Rinse Regular Kit    Recipe 1/4 teaspoon of salt and 1/4 teaspoon of baking soda in distilled water.  Be sure to tilt head forward as shown in picture.            flonase or nasonex over the counter.  2 sprays per nostril daily. Be sure to tilt head forward when dispensing medication.        [facility]  Arely García  [phone]    Programs include   *30 day free membership   *1 hour complimentary personal training session   *1 hour nutrition talk   *discounted [facility] membership

## 2017-10-12 NOTE — PROGRESS NOTES
Navjot Segovia  was seen as a new patient at the request of  Henrik Vazquez MD for the evaluation of  bentley.    CHIEF COMPLAINT:    Chief Complaint   Patient presents with    Sleep Apnea       HISTORY OF PRESENT ILLNESS: Navjot Segovia is a 42 y.o. female is here for sleep evaluation.   Patient with loud snoring.  Patient was diagnosed with bentley in 2011 with ahi of 18.8.  Patient with poor experience during sleep study and never follow up for titration.      Currently, patient with loud snoring.  +witnessed apnea.  +waking up with gasping sensation.  Feeling tired upon awake.  No sleepiness a/w driving.  No parasomnia.  No cataplexy.  No rls symptoms.      [place] Sleepiness Scale score during initial sleep evaluation was 12.    SLEEP ROUTINE:  Activity the hour prior to sleep: watch tv in bed  Bed partner:  fitaylor  Time to bed:  10 pm   Lights off:  yes  Sleep onset latency:  10 minutes        Disruptions or awakenings:    6 times (no difficulty going back to sleep)    Wakeup time:      6 am   Perceived sleep quality:  tire       Daytime naps:      60 minutes  Weekend sleep routine:      12 am to 11 am (more rested)  Caffeine use: 1 cup of coffee per day  exercise habit:   rarely      PAST MEDICAL HISTORY:    Active Ambulatory Problems     Diagnosis Date Noted    Hypertension     Hyperlipidemia     Chest pain at rest 01/25/2013    Sebaceous cyst of breast 10/30/2013    Heavy menses 01/16/2015     Resolved Ambulatory Problems     Diagnosis Date Noted    History of acne     Noncompliance 01/22/2013    Abscess of left breast 06/21/2013    URI (upper respiratory infection) 01/06/2014    Flu-like symptoms 01/06/2014    Bronchitis 03/17/2014    Pharyngitis 03/17/2014     Past Medical History:   Diagnosis Date    History of acne     Hx of psychiatric care     Hyperlipidemia     Hypertension     Keloid cicatrix     Psychiatric problem     Therapy                 PAST SURGICAL HISTORY:    Past  Surgical History:   Procedure Laterality Date     SECTION, CLASSIC      x 2    Laparoscopic bilateral tubal ligation.           FAMILY HISTORY:                Family History   Problem Relation Age of Onset    Hypertension Father     Heart disease Father 46    Aortic aneurysm Father      abdominal    Hypertension Mother     Depression Sister     Breast cancer Sister     Schizophrenia Maternal Uncle     Schizophrenia Cousin     Ovarian cancer Neg Hx        SOCIAL HISTORY:          Tobacco:   History   Smoking Status    Never Smoker   Smokeless Tobacco    Never Used       alcohol use:    History   Alcohol Use    Yes     Comment: Social; rare use                 Occupation:  Financial counselor at [employer]    ALLERGIES:    Review of patient's allergies indicates:   Allergen Reactions    Ace inhibitors      Other reaction(s): cough       CURRENT MEDICATIONS:    Current Outpatient Prescriptions   Medication Sig Dispense Refill    amlodipine (NORVASC) 10 MG tablet TAKE 1 TABLET (10 MG TOTAL) BY MOUTH ONCE DAILY. 30 tablet 3    carvedilol (COREG) 12.5 MG tablet Take 1 tablet (12.5 mg total) by mouth 2 (two) times daily with meals. (Patient taking differently: Take 12.5 mg by mouth every evening. ) 60 tablet 11    duloxetine (CYMBALTA) 30 MG capsule Take 1 capsule (30 mg total) by mouth once daily. 30 capsule 1    fluocinonide (LIDEX) 0.05 % external solution APPLY TO AFFECTED AREA ONCE A DAY  4    mirtazapine (REMERON) 15 MG tablet Take 1 tablet (15 mg total) by mouth every evening. 30 tablet 2    multivitamin with minerals tablet Take 1 tablet by mouth once daily.      naproxen (NAPROSYN) 500 MG tablet Take 1 tablet (500 mg total) by mouth daily as needed. 60 tablet 0    triamterene-hydrochlorothiazide 37.5-25 mg (DYAZIDE) 37.5-25 mg per capsule Take 1 capsule by mouth every morning. 30 capsule 11    valsartan (DIOVAN) 320 MG tablet TAKE 1 TABLET (320 MG TOTAL) BY MOUTH ONCE DAILY. 30 tablet  "2     No current facility-administered medications for this visit.                   REVIEW OF SYSTEMS:     Sleep related symptoms as per HPI.  CONST: + weight gain    HEENT: + sinus congestion  PULM: Denies dyspnea  CARD:  + palpitations at night  GI:  Denies acid reflux  : Denies polyuria  NEURO: + headaches daily  PSYCH: Denies mood disturbance  HEME: Denies anemia   Otherwise, a balance of systems reviewed is negative.          PHYSICAL EXAM:  Vitals:    10/12/17 1411   BP: (!) 146/93   Pulse: 97   SpO2: 97%   Weight: 89.2 kg (196 lb 10.4 oz)   Height: 5' 6" (1.676 m)   PainSc: 0-No pain     Body mass index is 31.74 kg/m².     GENERAL: Normal development, well groomed  HEENT:  Conjunctivae are non-erythematous; Pupils equal, round, and reactive to light; Nose is symmetrical; Nasal mucosa is pink and moist; Septum is midline; Inferior turbinates are normal; Nasal airflow is normal; Posterior pharynx is pink; Modified Mallampati: 4; Posterior palate is normal; Tonsils +1; Uvula is normal and pink;Tongue is normal; Dentition is fair; No TMJ tenderness; Jaw opening and protrusion without click and without discomfort.  Tenderness over maxillary region.  NECK: Supple. Neck circumference is 16.5 inches. No thyromegaly. No palpable nodes.     SKIN: On face and neck: No abrasions, no rashes, no lesions.  No subcutaneous nodules are palpable.  RESPIRATORY: Chest is clear to auscultation.  Normal chest expansion and non-labored breathing at rest.  CARDIOVASCULAR: Normal S1, S2.  No murmurs, gallops or rubs. No carotid bruits bilaterally.  EXTREMITIES: No edema. No clubbing. No cyanosis. Station normal. Gait normal.        NEURO/PSYCH: Oriented to time, place and person. Normal attention span and concentration. Affect is full. Mood is normal.                                              DATA psg 7/7/11 ahi of 18.8; sleep efficiency of 43%  Lab Results   Component Value Date    TSH 1.197 06/06/2017     ASSESSMENT    " ICD-10-CM ICD-9-CM    1. ROSALINE (obstructive sleep apnea) G47.33 327.23 CPAP FOR HOME USE   2. Nasal congestion R09.81 478.19    3. Class 1 obesity with body mass index (BMI) of 31.0 to 31.9 in adult, unspecified obesity type, unspecified whether serious comorbidity present E66.9 278.00 Ambulatory Referral to [facility])    Z68.31 V85.31 [facility] MYCHART ASSIGN QUESTIONNAIRE SERIES ([employer])      MyChart Patient Entered [facility] ([employer])       PLAN:    Sleep Apnea NEC. - result of sleep study d/w patient.  Recommend treatment due severity and restless sleep.  Patient agreed to apap.     Nasal congestion - sinus irrigation and nasal steroid.  May consider ct of sinus/ent referral if not better.      Obesity - aware of need for drastic weight loss.      Diagnostic: Polysomnogram. The nature of this procedure and its indication was discussed with the patient.     Education: During our discussion today, we talked about the etiology of obstructive sleep apnea as well as the potential ramifications of untreated sleep apnea, which could include daytime sleepiness, hypertension, heart disease and/or stroke.     Precautions: The patient was advised to abstain from driving should they feel sleepy or drowsy.       Thank you for allowing me the opportunity to participate in the care of your patient.    Patient will No Follow-up on file. with md/np.    Please cc note to  Henrik Vazquez MD.

## 2017-10-16 ENCOUNTER — PATIENT OUTREACH (OUTPATIENT)
Dept: INTERNAL MEDICINE | Facility: CLINIC | Age: 42
End: 2017-10-16

## 2017-10-16 NOTE — PROGRESS NOTES
[facility] is committed to your overall health.  To help you get the most out of each of your visits, we will review your information to make sure you are up to date on all of your recommended tests and/or procedures.       Your PCP  Henrik Vazquez MD   found that you may be due for:     Health Maintenance Due   Topic Date Due    Pap Smear with HPV Cotest  01/14/2017    Influenza Vaccine  08/01/2017             If you have had any of the above done at another facility, please bring the records or information with you so that your record at [facility] will be complete.  If you would like to schedule any of these, please contact me.     If you are currently taking medication, please bring it with you to your appointment for review.     Also, if you have any type of Advanced Directives, please bring them with you to your office visit so we may scan them into your chart.       Thank you for choosing [facility] for your healthcare needs.

## 2017-11-02 DIAGNOSIS — I10 HYPERTENSION, ESSENTIAL: ICD-10-CM

## 2017-11-02 RX ORDER — CARVEDILOL 12.5 MG/1
12.5 TABLET ORAL 2 TIMES DAILY WITH MEALS
Qty: 60 TABLET | Refills: 11 | Status: SHIPPED | OUTPATIENT
Start: 2017-11-02 | End: 2018-08-17

## 2017-12-05 DIAGNOSIS — I10 HYPERTENSION, ESSENTIAL: ICD-10-CM

## 2017-12-05 RX ORDER — VALSARTAN 320 MG/1
320 TABLET ORAL DAILY
Qty: 30 TABLET | Refills: 2 | Status: SHIPPED | OUTPATIENT
Start: 2017-12-05 | End: 2018-02-02 | Stop reason: SDUPTHER

## 2018-02-02 DIAGNOSIS — I10 HYPERTENSION, ESSENTIAL: ICD-10-CM

## 2018-02-02 RX ORDER — VALSARTAN 320 MG/1
320 TABLET ORAL DAILY
Qty: 30 TABLET | Refills: 2 | Status: SHIPPED | OUTPATIENT
Start: 2018-02-02 | End: 2018-06-07 | Stop reason: SDUPTHER

## 2018-05-22 ENCOUNTER — HOSPITAL ENCOUNTER (OUTPATIENT)
Facility: HOSPITAL | Age: 43
Discharge: HOME | End: 2018-05-22
Attending: EMERGENCY MEDICINE | Admitting: EMERGENCY MEDICINE
Payer: COMMERCIAL

## 2018-05-22 VITALS
HEART RATE: 86 BPM | DIASTOLIC BLOOD PRESSURE: 94 MMHG | BODY MASS INDEX: 30.22 KG/M2 | TEMPERATURE: 99 F | HEIGHT: 66 IN | SYSTOLIC BLOOD PRESSURE: 146 MMHG | RESPIRATION RATE: 18 BRPM | WEIGHT: 188 LBS | OXYGEN SATURATION: 100 %

## 2018-05-22 DIAGNOSIS — I10 ESSENTIAL HYPERTENSION: ICD-10-CM

## 2018-05-22 DIAGNOSIS — R07.9 CHEST PAIN: Primary | ICD-10-CM

## 2018-05-22 PROBLEM — F32.A DEPRESSION: Status: ACTIVE | Noted: 2018-05-22

## 2018-05-22 LAB
ALBUMIN SERPL BCP-MCNC: 3.9 G/DL
ALP SERPL-CCNC: 23 U/L
ALT SERPL W/O P-5'-P-CCNC: 61 U/L
ANION GAP SERPL CALC-SCNC: 10 MMOL/L
AST SERPL-CCNC: 37 U/L
BASOPHILS # BLD AUTO: 0.02 K/UL
BASOPHILS NFR BLD: 0.5 %
BILIRUB SERPL-MCNC: 0.7 MG/DL
BNP SERPL-MCNC: <10 PG/ML
BUN SERPL-MCNC: 11 MG/DL
CALCIUM SERPL-MCNC: 9.2 MG/DL
CHLORIDE SERPL-SCNC: 103 MMOL/L
CHOLEST SERPL-MCNC: 253 MG/DL
CHOLEST/HDLC SERPL: 4.3 {RATIO}
CO2 SERPL-SCNC: 25 MMOL/L
CREAT SERPL-MCNC: 0.9 MG/DL
DIASTOLIC DYSFUNCTION: NO
DIFFERENTIAL METHOD: ABNORMAL
EOSINOPHIL # BLD AUTO: 0.1 K/UL
EOSINOPHIL NFR BLD: 1.7 %
ERYTHROCYTE [DISTWIDTH] IN BLOOD BY AUTOMATED COUNT: 13.1 %
EST. GFR  (AFRICAN AMERICAN): >60 ML/MIN/1.73 M^2
EST. GFR  (NON AFRICAN AMERICAN): >60 ML/MIN/1.73 M^2
ESTIMATED PA SYSTOLIC PRESSURE: 28
GLUCOSE SERPL-MCNC: 95 MG/DL
HCT VFR BLD AUTO: 41.6 %
HDLC SERPL-MCNC: 59 MG/DL
HDLC SERPL: 23.3 %
HGB BLD-MCNC: 14.7 G/DL
IMM GRANULOCYTES # BLD AUTO: 0.01 K/UL
IMM GRANULOCYTES NFR BLD AUTO: 0.2 %
INR PPP: 1
LDLC SERPL CALC-MCNC: 161.8 MG/DL
LYMPHOCYTES # BLD AUTO: 1.6 K/UL
LYMPHOCYTES NFR BLD: 38.6 %
MCH RBC QN AUTO: 31.5 PG
MCHC RBC AUTO-ENTMCNC: 35.3 G/DL
MCV RBC AUTO: 89 FL
MONOCYTES # BLD AUTO: 0.5 K/UL
MONOCYTES NFR BLD: 13.1 %
NEUTROPHILS # BLD AUTO: 1.9 K/UL
NEUTROPHILS NFR BLD: 45.9 %
NONHDLC SERPL-MCNC: 194 MG/DL
NRBC BLD-RTO: 0 /100 WBC
PLATELET # BLD AUTO: 292 K/UL
PMV BLD AUTO: 10.1 FL
POTASSIUM SERPL-SCNC: 3.5 MMOL/L
PROT SERPL-MCNC: 7.2 G/DL
PROTHROMBIN TIME: 10.2 SEC
RBC # BLD AUTO: 4.66 M/UL
RETIRED EF AND QEF - SEE NOTES: 60 (ref 55–65)
SODIUM SERPL-SCNC: 138 MMOL/L
TRIGL SERPL-MCNC: 161 MG/DL
TROPONIN I SERPL DL<=0.01 NG/ML-MCNC: <0.006 NG/ML
WBC # BLD AUTO: 4.12 K/UL

## 2018-05-22 PROCEDURE — 84484 ASSAY OF TROPONIN QUANT: CPT

## 2018-05-22 PROCEDURE — 63600175 PHARM REV CODE 636 W HCPCS: Performed by: EMERGENCY MEDICINE

## 2018-05-22 PROCEDURE — 93306 TTE W/DOPPLER COMPLETE: CPT | Mod: 26,,, | Performed by: INTERNAL MEDICINE

## 2018-05-22 PROCEDURE — G0378 HOSPITAL OBSERVATION PER HR: HCPCS

## 2018-05-22 PROCEDURE — 99284 EMERGENCY DEPT VISIT MOD MDM: CPT | Mod: 25

## 2018-05-22 PROCEDURE — 63600175 PHARM REV CODE 636 W HCPCS: Performed by: PHYSICIAN ASSISTANT

## 2018-05-22 PROCEDURE — 25000242 PHARM REV CODE 250 ALT 637 W/ HCPCS: Performed by: PHYSICIAN ASSISTANT

## 2018-05-22 PROCEDURE — 84484 ASSAY OF TROPONIN QUANT: CPT | Mod: 91

## 2018-05-22 PROCEDURE — 96374 THER/PROPH/DIAG INJ IV PUSH: CPT | Mod: 59

## 2018-05-22 PROCEDURE — 93306 TTE W/DOPPLER COMPLETE: CPT

## 2018-05-22 PROCEDURE — 93010 ELECTROCARDIOGRAM REPORT: CPT | Mod: ,,, | Performed by: INTERNAL MEDICINE

## 2018-05-22 PROCEDURE — 83880 ASSAY OF NATRIURETIC PEPTIDE: CPT

## 2018-05-22 PROCEDURE — 80061 LIPID PANEL: CPT

## 2018-05-22 PROCEDURE — 36415 COLL VENOUS BLD VENIPUNCTURE: CPT

## 2018-05-22 PROCEDURE — 99220 PR INITIAL OBSERVATION CARE,LEVL III: CPT | Mod: ,,, | Performed by: PHYSICIAN ASSISTANT

## 2018-05-22 PROCEDURE — 25000003 PHARM REV CODE 250: Performed by: EMERGENCY MEDICINE

## 2018-05-22 PROCEDURE — 94640 AIRWAY INHALATION TREATMENT: CPT

## 2018-05-22 PROCEDURE — 85025 COMPLETE CBC W/AUTO DIFF WBC: CPT

## 2018-05-22 PROCEDURE — 93005 ELECTROCARDIOGRAM TRACING: CPT

## 2018-05-22 PROCEDURE — 85610 PROTHROMBIN TIME: CPT

## 2018-05-22 PROCEDURE — 80053 COMPREHEN METABOLIC PANEL: CPT

## 2018-05-22 RX ORDER — PROCHLORPERAZINE EDISYLATE 5 MG/ML
10 INJECTION INTRAMUSCULAR; INTRAVENOUS ONCE
Status: DISCONTINUED | OUTPATIENT
Start: 2018-05-22 | End: 2018-05-22

## 2018-05-22 RX ORDER — NITROGLYCERIN 0.3 MG/1
0.3 TABLET SUBLINGUAL EVERY 5 MIN PRN
Qty: 10 TABLET | Refills: 0 | Status: SHIPPED | OUTPATIENT
Start: 2018-05-22 | End: 2019-06-24

## 2018-05-22 RX ORDER — IBUPROFEN 200 MG
16 TABLET ORAL
Status: DISCONTINUED | OUTPATIENT
Start: 2018-05-22 | End: 2018-05-22 | Stop reason: HOSPADM

## 2018-05-22 RX ORDER — NITROGLYCERIN 0.4 MG/1
0.4 TABLET SUBLINGUAL
Status: COMPLETED | OUTPATIENT
Start: 2018-05-22 | End: 2018-05-22

## 2018-05-22 RX ORDER — AMOXICILLIN 250 MG
1 CAPSULE ORAL 2 TIMES DAILY
Status: DISCONTINUED | OUTPATIENT
Start: 2018-05-22 | End: 2018-05-22 | Stop reason: HOSPADM

## 2018-05-22 RX ORDER — ENOXAPARIN SODIUM 100 MG/ML
40 INJECTION SUBCUTANEOUS EVERY 24 HOURS
Status: DISCONTINUED | OUTPATIENT
Start: 2018-05-22 | End: 2018-05-22 | Stop reason: HOSPADM

## 2018-05-22 RX ORDER — GLUCAGON 1 MG
1 KIT INJECTION
Status: DISCONTINUED | OUTPATIENT
Start: 2018-05-22 | End: 2018-05-22 | Stop reason: HOSPADM

## 2018-05-22 RX ORDER — IBUPROFEN 200 MG
24 TABLET ORAL
Status: DISCONTINUED | OUTPATIENT
Start: 2018-05-22 | End: 2018-05-22 | Stop reason: HOSPADM

## 2018-05-22 RX ORDER — ACETAMINOPHEN 325 MG/1
650 TABLET ORAL EVERY 8 HOURS PRN
Status: DISCONTINUED | OUTPATIENT
Start: 2018-05-22 | End: 2018-05-22 | Stop reason: HOSPADM

## 2018-05-22 RX ORDER — ASPIRIN 325 MG
325 TABLET ORAL
Status: COMPLETED | OUTPATIENT
Start: 2018-05-22 | End: 2018-05-22

## 2018-05-22 RX ORDER — IPRATROPIUM BROMIDE AND ALBUTEROL SULFATE 2.5; .5 MG/3ML; MG/3ML
3 SOLUTION RESPIRATORY (INHALATION) EVERY 4 HOURS
Status: DISCONTINUED | OUTPATIENT
Start: 2018-05-22 | End: 2018-05-22 | Stop reason: HOSPADM

## 2018-05-22 RX ORDER — HYDROXYZINE HYDROCHLORIDE 25 MG/1
25 TABLET, FILM COATED ORAL 4 TIMES DAILY PRN
Qty: 40 TABLET | Refills: 2 | Status: SHIPPED | OUTPATIENT
Start: 2018-05-22 | End: 2018-06-01

## 2018-05-22 RX ORDER — ONDANSETRON 8 MG/1
8 TABLET, ORALLY DISINTEGRATING ORAL EVERY 8 HOURS PRN
Status: DISCONTINUED | OUTPATIENT
Start: 2018-05-22 | End: 2018-05-22 | Stop reason: HOSPADM

## 2018-05-22 RX ORDER — PROCHLORPERAZINE EDISYLATE 5 MG/ML
5 INJECTION INTRAMUSCULAR; INTRAVENOUS EVERY 6 HOURS PRN
Status: DISCONTINUED | OUTPATIENT
Start: 2018-05-22 | End: 2018-05-22 | Stop reason: HOSPADM

## 2018-05-22 RX ORDER — PROCHLORPERAZINE EDISYLATE 5 MG/ML
10 INJECTION INTRAMUSCULAR; INTRAVENOUS ONCE
Status: COMPLETED | OUTPATIENT
Start: 2018-05-22 | End: 2018-05-22

## 2018-05-22 RX ORDER — POLYETHYLENE GLYCOL 3350 17 G/17G
17 POWDER, FOR SOLUTION ORAL DAILY
Status: DISCONTINUED | OUTPATIENT
Start: 2018-05-22 | End: 2018-05-22 | Stop reason: HOSPADM

## 2018-05-22 RX ORDER — SODIUM CHLORIDE 0.9 % (FLUSH) 0.9 %
5 SYRINGE (ML) INJECTION
Status: DISCONTINUED | OUTPATIENT
Start: 2018-05-22 | End: 2018-05-22 | Stop reason: HOSPADM

## 2018-05-22 RX ADMIN — NITROGLYCERIN 0.4 MG: 0.4 TABLET SUBLINGUAL at 09:05

## 2018-05-22 RX ADMIN — IPRATROPIUM BROMIDE AND ALBUTEROL SULFATE 3 ML: .5; 3 SOLUTION RESPIRATORY (INHALATION) at 04:05

## 2018-05-22 RX ADMIN — PROCHLORPERAZINE EDISYLATE 10 MG: 5 INJECTION INTRAMUSCULAR; INTRAVENOUS at 10:05

## 2018-05-22 RX ADMIN — ASPIRIN 325 MG ORAL TABLET 325 MG: 325 PILL ORAL at 09:05

## 2018-05-22 NOTE — ASSESSMENT & PLAN NOTE
BP elevated on admit, no medications this morning, has since come down  - amlodipine (NORVASC) 10 MG tablet  - triamterene-hydrochlorothiazide 37.5-25 mg   - valsartan (DIOVAN) 320 MG tablet

## 2018-05-22 NOTE — H&P
"[facility] Hospital Medicine  History & Physical    Patient Name: Navjot Segovia  MRN: [id number]  Admission Date: 5/22/2018  Attending Physician: Ingris Andino MD   Primary Care Provider: Henrik Vazquez MD    [facility] Medicine Team: [facility] MED E Denis Thompson PA-C     Patient information was obtained from patient and ER records.     Subjective:     Principal Problem:Chest pain    Chief Complaint:   Chief Complaint   Patient presents with    Chest Pain     intermittent x few days, squeezing pain. Nause this AM. Denies cardiac hx.         HPI: Navjot Segovia is a 43 y.o. lady with history of TIA (2011), GERD (untreated), hypertension who presented to the ED with complaints of mid-sternal chest pain described as a dull, squeezing sensation. She first noticed the pain on Sunday and it  has occurred intermittently since. This morning she awoke from her sleep with the chest pain and had an intense urge to vomit. She admits to radiation into her left shoulder (tightness) as well. She endorses increased stress at home and at work as she states she often finds herself "fighting back tears" at work. She reports shortness of breath for the past month, aggravated when lying flat or when she gets excited; these symptoms are not related to the episodes of chest pain. She denies diaphoresis, headache, vision changes, abdominal pain, belching, vomiting, constipation or diarrhea. She denies any cardiac history but reports that her father has had an MI in the past. She denies tobacco use. She reports a past cardiac work-up and stress test completed 7 years ago that was negative.     ED: Nitroglycerin administered with positive effect. Troponin's and BNP negative. Intake labs unremarkable. CXR unremarkable. EKG with NSR, 86 BPM.     Past Medical History:   Diagnosis Date    History of acne     Hx of psychiatric care     Hyperlipidemia     Hypertension     Keloid cicatrix     Psychiatric " problem     Therapy        Past Surgical History:   Procedure Laterality Date     SECTION, CLASSIC      x 2    Laparoscopic bilateral tubal ligation.         Review of patient's allergies indicates:   Allergen Reactions    Ace inhibitors      Other reaction(s): cough       No current facility-administered medications on file prior to encounter.      Current Outpatient Prescriptions on File Prior to Encounter   Medication Sig    amlodipine (NORVASC) 10 MG tablet TAKE 1 TABLET (10 MG TOTAL) BY MOUTH ONCE DAILY.    carvedilol (COREG) 12.5 MG tablet Take 1 tablet (12.5 mg total) by mouth 2 (two) times daily with meals.    mirtazapine (REMERON) 15 MG tablet Take 1 tablet (15 mg total) by mouth every evening.    naproxen (NAPROSYN) 500 MG tablet Take 1 tablet (500 mg total) by mouth daily as needed.    valsartan (DIOVAN) 320 MG tablet Take 1 tablet (320 mg total) by mouth once daily.    duloxetine (CYMBALTA) 30 MG capsule Take 1 capsule (30 mg total) by mouth once daily.    fluocinonide (LIDEX) 0.05 % external solution APPLY TO AFFECTED AREA ONCE A DAY    multivitamin with minerals tablet Take 1 tablet by mouth once daily.    triamterene-hydrochlorothiazide 37.5-25 mg (DYAZIDE) 37.5-25 mg per capsule Take 1 capsule by mouth every morning.     Family History     Problem Relation (Age of Onset)    Aortic aneurysm Father    Breast cancer Sister    Depression Sister    Heart disease Father (46)    Hypertension Father, Mother    Schizophrenia Maternal Uncle, Cousin        Social History Main Topics    Smoking status: Never Smoker    Smokeless tobacco: Never Used    Alcohol use Yes      Comment: Social; rare use    Drug use: No    Sexual activity: Yes     Partners: Male     Review of Systems   Constitutional: Negative for activity change, appetite change, diaphoresis and fever.   HENT: Negative for tinnitus and voice change.    Eyes: Negative for redness and visual disturbance.   Respiratory: Positive  for chest tightness and shortness of breath. Negative for wheezing.    Cardiovascular: Negative for chest pain, palpitations and leg swelling.   Gastrointestinal: Positive for nausea. Negative for abdominal distention, abdominal pain and constipation.   Endocrine: Negative for polyuria.   Genitourinary: Negative for difficulty urinating and dysuria.   Musculoskeletal: Positive for arthralgias. Negative for gait problem, joint swelling and neck stiffness.   Skin: Negative for color change.   Allergic/Immunologic: Negative for immunocompromised state.   Neurological: Negative for dizziness, syncope, facial asymmetry, speech difficulty, weakness and light-headedness.   Psychiatric/Behavioral: Negative for agitation and confusion. The patient is nervous/anxious.      Objective:     Vital Signs (Most Recent):  Temp: 98.7 °F (37.1 °C) (05/22/18 1550)  Pulse: 86 (05/22/18 1644)  Resp: 18 (05/22/18 1644)  BP: (!) 146/94 (05/22/18 1550)  SpO2: 100 % (05/22/18 1644) Vital Signs (24h Range):  Temp:  [98.6 °F (37 °C)-98.7 °F (37.1 °C)] 98.7 °F (37.1 °C)  Pulse:  [72-90] 86  Resp:  [17-29] 18  SpO2:  [93 %-100 %] 100 %  BP: (130-183)/() 146/94     Weight: 85.3 kg (188 lb)  Body mass index is 30.34 kg/m².    Physical Exam   Constitutional: She is oriented to person, place, and time. She appears well-developed and well-nourished. No distress.   HENT:   Head: Normocephalic and atraumatic.   Eyes: EOM are normal. Pupils are equal, round, and reactive to light.   Neck: Normal range of motion.   Cardiovascular: Normal rate, regular rhythm and normal heart sounds.    No murmur heard.  Pulmonary/Chest: Effort normal and breath sounds normal. No stridor. No respiratory distress. She has no wheezes.   Abdominal: Soft. Bowel sounds are normal. She exhibits no distension.   Musculoskeletal: Normal range of motion. She exhibits no edema or tenderness.   Neurological: She is alert and oriented to person, place, and time. Coordination  normal.   Skin: Skin is warm and dry. No erythema.   Psychiatric: She has a normal mood and affect. Her behavior is normal.   Nursing note and vitals reviewed.        CRANIAL NERVES     CN III, IV, VI   Pupils are equal, round, and reactive to light.  Extraocular motions are normal.        Significant Labs: All pertinent labs within the past 24 hours have been reviewed.    Significant Imaging: I have reviewed all pertinent imaging results/findings within the past 24 hours.    Assessment/Plan:     * Chest pain    43 y.o. presented with intermittent since Sunday, possible anxiety vs. vasospasm (relief with nitro?). Patient believes increased stress at work and home are contributing factors.   - EKG unremarkable, NSR with 86 BPM  - CXR unremarkable  - Troponins negative, will trend  - 2D ECHO without wall motion abnormalities, EF 60-65%, - DD, PA pressure 28 mmHg  - denies tobacco use  - FHx + father with MI  - will continue to monitor for acute changes         Hypertension    BP elevated on admit, no medications this morning, has since come down  - amlodipine (NORVASC) 10 MG tablet  - triamterene-hydrochlorothiazide 37.5-25 mg   - valsartan (DIOVAN) 320 MG tablet        Depression    - duloxetine (CYMBALTA) 30 MG capsule  - mirtazapine (REMERON) 15 MG tablet          VTE Risk Mitigation         Ordered     enoxaparin injection 40 mg  Daily      05/22/18 1212     IP VTE HIGH RISK PATIENT  Once      05/22/18 1212             Denis Thompson PA-C  Department of Hospital Medicine   [facility]

## 2018-05-22 NOTE — ED PROVIDER NOTES
Encounter Date: 2018       History     Chief Complaint   Patient presents with    Chest Pain     intermittent x few days, squeezing pain. Nause this AM. Denies cardiac hx.      43-year-old female with hypertension, hyperlipidemia, history of TIA presents for intermittent chest pain x2 days.  Reports pain is midsternal, squeezing, nonradiating pain that is nonexertional and not associated with position.  Denies any palliating or provoking factors, has not taken any medication.  Reports associated shortness of breath x2 weeks, worse at night, and is becoming winded quickly while talking.  Also reporting associated left arm aching and brief, intense nausea this a.m..  Denies vomiting, abdominal pain, diaphoresis, lightheadedness, weakness or syncope denies fever, URI symptoms, recent travel or surgeries, OCP use.  Reports that she had stress test and cardiac workup approximately 7 years ago after her TIA.          Review of patient's allergies indicates:   Allergen Reactions    Ace inhibitors      Other reaction(s): cough     Past Medical History:   Diagnosis Date    History of acne     Hx of psychiatric care     Hyperlipidemia     Hypertension     Keloid cicatrix     Psychiatric problem     Therapy      Past Surgical History:   Procedure Laterality Date     SECTION, CLASSIC      x 2    Laparoscopic bilateral tubal ligation.       Family History   Problem Relation Age of Onset    Hypertension Father     Heart disease Father 46    Aortic aneurysm Father         abdominal    Hypertension Mother     Depression Sister     Breast cancer Sister     Schizophrenia Maternal Uncle     Schizophrenia Cousin     Ovarian cancer Neg Hx      Social History   Substance Use Topics    Smoking status: Never Smoker    Smokeless tobacco: Never Used    Alcohol use Yes      Comment: Social; rare use     Review of Systems   Constitutional: Negative for diaphoresis, fatigue and fever.   HENT: Negative for  sinus pain and sinus pressure.    Respiratory: Positive for shortness of breath. Negative for cough.    Cardiovascular: Positive for chest pain. Negative for palpitations and leg swelling.   Gastrointestinal: Positive for nausea. Negative for abdominal pain, constipation, diarrhea and vomiting.   Genitourinary: Negative for difficulty urinating, dysuria, flank pain, frequency and hematuria.   Musculoskeletal: Positive for myalgias. Negative for back pain, gait problem, joint swelling, neck pain and neck stiffness.   Skin: Negative for rash.   Allergic/Immunologic: Negative for immunocompromised state.   Neurological: Negative for seizures, syncope, facial asymmetry, speech difficulty, weakness, light-headedness, numbness and headaches.       Physical Exam     Initial Vitals [05/22/18 0815]   BP Pulse Resp Temp SpO2   (!) 183/100 88 18 98.6 °F (37 °C) 97 %      MAP       127.67         Physical Exam    Nursing note and vitals reviewed.  Constitutional: She appears well-developed and well-nourished. She is not diaphoretic. No distress.   HENT:   Head: Normocephalic and atraumatic.   Eyes: EOM are normal. Pupils are equal, round, and reactive to light.   Neck: Normal range of motion. Neck supple.   Cardiovascular: Normal rate, regular rhythm, normal heart sounds and intact distal pulses. Exam reveals no gallop and no friction rub.    No murmur heard.  Pulmonary/Chest: Breath sounds normal. No respiratory distress. She has no wheezes. She has no rhonchi. She has no rales. She exhibits no tenderness.   Abdominal: Soft. Bowel sounds are normal. She exhibits no distension and no mass. There is no tenderness. There is no rebound and no guarding.   Musculoskeletal: Normal range of motion. She exhibits no edema or tenderness.   Neurological: She is alert and oriented to person, place, and time. She has normal strength.   Skin: Skin is warm and dry.   Psychiatric: She has a normal mood and affect.         ED Course    Procedures  Labs Reviewed   CBC W/ AUTO DIFFERENTIAL - Abnormal; Notable for the following:        Result Value    MCH 31.5 (*)     All other components within normal limits   PROTIME-INR   B-TYPE NATRIURETIC PEPTIDE   COMPREHENSIVE METABOLIC PANEL   TROPONIN I   POCT URINE PREGNANCY     Imaging Results          X-Ray Chest PA And Lateral (Final result)  Result time 05/22/18 09:10:31    Final result by Pio Rogers III, MD (05/22/18 09:10:31)                 Impression:      See above    No acute process seen.      Electronically signed by: Pio Rogers MD  Date:    05/22/2018  Time:    09:10             Narrative:    EXAMINATION:  XR CHEST PA AND LATERAL    CLINICAL HISTORY:  Chest pain, unspecified    FINDINGS:  Heart size is normal.  Lungs are clear.  Bones show mild DJD.                                        Medical Decision Making:   History:   Old Medical Records: I decided to obtain old medical records.  Clinical Tests:   Lab Tests: Ordered and Reviewed  Radiological Study: Ordered and Reviewed    Additional MDM:   PERC Rule:   Age is greater than or equal to 50 = 0.0  Heart Rate is greater than or equal to 100 = 0.0  SaO2 on room air < 95% = 0.0  Unilateral leg swelling = 0.0  Hemoptysis = 0.0  Recent surgery or trauma = 0.0  Prior PE or DVT =  0.0  Hormone use = 0.00  PERC Score = 0  Heart Score:    History:          Moderately suspicious.  ECG:             Nonspecific repolarisation disturbance  Age:               Less than 45 years  Risk factors: 1-2 risk factors  Troponin:       Less than or equal to normal limit  Final Score: 3        APC / Resident Notes:   43-year-old female presenting with intermittent squeezing chest pain x2 days. Vitals stable with normal exam. DDx includes ACS, angina, chest wall strain, pneumonia. I considered PE but patient is PERC negative. Will check labs, give nitro and aspirin and reassess.    Patient feeling improved pain after nitro.  Will admit to Medicine for  ACS rule out.  I discussed this patient with my supervising physician.    Carolyn Waters PA-C                       Clinical Impression:   The primary encounter diagnosis was Chest pain. A diagnosis of Essential hypertension was also pertinent to this visit.    Disposition:   Disposition: Admitted  Condition: Stable                        Carolyn Waters PA-C  05/22/18 4778

## 2018-05-22 NOTE — PLAN OF CARE
Problem: Patient Care Overview  Goal: Plan of Care Review  Outcome: Ongoing (interventions implemented as appropriate)  Pt arrived to OBS # 4 from ED via stretcher. Pt aao x 4 ambulated to bathroom. Telemetry monitor in place. Pt denies chest pain or discomfort. Safety precautions maintained. Bed in low position wheels locked. Side rails up x 2. Call light within reach/ pt free of falls.

## 2018-05-22 NOTE — HPI
"Navjot Segovia is a 43 y.o. lady with history of TIA (2011), GERD (untreated), hypertension who presented to the ED with complaints of mid-sternal chest pain described as a dull, squeezing sensation. She first noticed the pain on Sunday and it  has occurred intermittently since. This morning she awoke from her sleep with the chest pain and had an intense urge to vomit. She admits to radiation into her left shoulder (tightness) as well. She endorses increased stress at home and at work as she states she often finds herself "fighting back tears" at work. She reports shortness of breath for the past month, aggravated when lying flat or when she gets excited; these symptoms are not related to the episodes of chest pain. She denies diaphoresis, headache, vision changes, abdominal pain, belching, vomiting, constipation or diarrhea. She denies any cardiac history but reports that her father has had an MI in the past. She denies tobacco use. She reports a past cardiac work-up and stress test completed 7 years ago that was negative.     ED: Nitroglycerin administered with positive effect. Troponin's and BNP negative. Intake labs unremarkable. CXR unremarkable. EKG with NSR, 86 BPM.   "

## 2018-05-22 NOTE — HOSPITAL COURSE
Navjot Begum RAVI Segovia was placed in observation for chest pain. 2D ECHO obtained with no wall motion abnormalities, EKG with NSR and troponin's unremarkable. Stress ECHO ordered at discharge. Patient stable for discharge home.

## 2018-05-22 NOTE — ASSESSMENT & PLAN NOTE
43 y.o. presented with intermittent since Sunday, possible anxiety vs. vasospasm (relief with nitro?). Patient believes increased stress at work and home are contributing factors.   - EKG unremarkable, NSR with 86 BPM  - CXR unremarkable  - Troponins negative, will trend  - 2D ECHO without wall motion abnormalities, EF 60-65%, - DD, PA pressure 28 mmHg  - denies tobacco use  - FHx + father with MI  - will continue to monitor for acute changes

## 2018-05-22 NOTE — PROVIDER PROGRESS NOTES - EMERGENCY DEPT.
Encounter Date: 5/22/2018    ED Physician Progress Notes         EKG - STEMI Decision  Initial Reading: No STEMI present.    I, Michela Forrest, am scribing for, and in the presence of, Dr. Urrutia. I performed the above scribed service and the documentation accurately describes the services I performed. I attest to the accuracy of the note.

## 2018-05-22 NOTE — PLAN OF CARE
05/22/18 1450   Discharge Assessment   Assessment Type Discharge Planning Assessment   Confirmed/corrected address and phone number on facesheet? Yes   Assessment information obtained from? Patient   Expected Length of Stay (days) 1   Communicated expected length of stay with patient/caregiver yes   Prior to hospitilization cognitive status: Alert/Oriented   Prior to hospitalization functional status: Independent   Current cognitive status: Alert/Oriented   Current Functional Status: Independent   Lives With significant other;child(parminder), dependent  (s.o., Chester Solis ([phone]), & dependent children)   Able to Return to Prior Arrangements yes   Is patient able to care for self after discharge? Yes   Patient's perception of discharge disposition home or selfcare   Readmission Within The Last 30 Days no previous admission in last 30 days   Patient currently being followed by outpatient case management? No   Patient currently receives any other outside agency services? No   Equipment Currently Used at Home none   Do you have any problems affording any of your prescribed medications? No   Is the patient taking medications as prescribed? yes   Does the patient have transportation home? Yes   Transportation Available car   Does the patient receive services at the [facility]? No   Discharge Plan A Home with family   Discharge Plan B Home Health   Patient/Family In Agreement With Plan yes     Dx: chest pain  Pharm: [facility]  Hosp f/u appt: Dr. Henrik Vazquez (PCP) on 6/6/18 at 0820

## 2018-05-22 NOTE — NURSING
Pt dc per MD orders. Dc and prescription instructions given. Pt verbalizes understanding. PIV removed catheter tip intact vss. Pt denies pain. Pt ambulated of unit.

## 2018-05-22 NOTE — PLAN OF CARE
05/22/2018      Navjot Segovia  [address]  [place] [address]          Hospital Medicine Dept.  [facility] [address] [place] [address]  [phone] after hours  [phone] fax Navjot Segovia has been hospitalized at the [facility] since 5/22/2018.  Please excuse the patient from duties.  Patient may return on 5/24/18.  No restrictions.     Please contact me if you have any questions.                  __________________________  Denis Thompson PA-C  05/22/2018

## 2018-05-22 NOTE — ED NOTES
Pt to rm 13 crm, nibp and spo2 in place. Nitro given and pt rating pain 0/10 currently.  Pt c/o nausea md notified

## 2018-05-22 NOTE — SUBJECTIVE & OBJECTIVE
Past Medical History:   Diagnosis Date    History of acne     Hx of psychiatric care     Hyperlipidemia     Hypertension     Keloid cicatrix     Psychiatric problem     Therapy        Past Surgical History:   Procedure Laterality Date     SECTION, CLASSIC      x 2    Laparoscopic bilateral tubal ligation.         Review of patient's allergies indicates:   Allergen Reactions    Ace inhibitors      Other reaction(s): cough       No current facility-administered medications on file prior to encounter.      Current Outpatient Prescriptions on File Prior to Encounter   Medication Sig    amlodipine (NORVASC) 10 MG tablet TAKE 1 TABLET (10 MG TOTAL) BY MOUTH ONCE DAILY.    carvedilol (COREG) 12.5 MG tablet Take 1 tablet (12.5 mg total) by mouth 2 (two) times daily with meals.    mirtazapine (REMERON) 15 MG tablet Take 1 tablet (15 mg total) by mouth every evening.    naproxen (NAPROSYN) 500 MG tablet Take 1 tablet (500 mg total) by mouth daily as needed.    valsartan (DIOVAN) 320 MG tablet Take 1 tablet (320 mg total) by mouth once daily.    duloxetine (CYMBALTA) 30 MG capsule Take 1 capsule (30 mg total) by mouth once daily.    fluocinonide (LIDEX) 0.05 % external solution APPLY TO AFFECTED AREA ONCE A DAY    multivitamin with minerals tablet Take 1 tablet by mouth once daily.    triamterene-hydrochlorothiazide 37.5-25 mg (DYAZIDE) 37.5-25 mg per capsule Take 1 capsule by mouth every morning.     Family History     Problem Relation (Age of Onset)    Aortic aneurysm Father    Breast cancer Sister    Depression Sister    Heart disease Father (46)    Hypertension Father, Mother    Schizophrenia Maternal Uncle, Cousin        Social History Main Topics    Smoking status: Never Smoker    Smokeless tobacco: Never Used    Alcohol use Yes      Comment: Social; rare use    Drug use: No    Sexual activity: Yes     Partners: Male     Review of Systems   Constitutional: Negative for activity change,  appetite change, diaphoresis and fever.   HENT: Negative for tinnitus and voice change.    Eyes: Negative for redness and visual disturbance.   Respiratory: Positive for chest tightness and shortness of breath. Negative for wheezing.    Cardiovascular: Negative for chest pain, palpitations and leg swelling.   Gastrointestinal: Positive for nausea. Negative for abdominal distention, abdominal pain and constipation.   Endocrine: Negative for polyuria.   Genitourinary: Negative for difficulty urinating and dysuria.   Musculoskeletal: Positive for arthralgias. Negative for gait problem, joint swelling and neck stiffness.   Skin: Negative for color change.   Allergic/Immunologic: Negative for immunocompromised state.   Neurological: Negative for dizziness, syncope, facial asymmetry, speech difficulty, weakness and light-headedness.   Psychiatric/Behavioral: Negative for agitation and confusion. The patient is nervous/anxious.      Objective:     Vital Signs (Most Recent):  Temp: 98.7 °F (37.1 °C) (05/22/18 1550)  Pulse: 86 (05/22/18 1644)  Resp: 18 (05/22/18 1644)  BP: (!) 146/94 (05/22/18 1550)  SpO2: 100 % (05/22/18 1644) Vital Signs (24h Range):  Temp:  [98.6 °F (37 °C)-98.7 °F (37.1 °C)] 98.7 °F (37.1 °C)  Pulse:  [72-90] 86  Resp:  [17-29] 18  SpO2:  [93 %-100 %] 100 %  BP: (130-183)/() 146/94     Weight: 85.3 kg (188 lb)  Body mass index is 30.34 kg/m².    Physical Exam   Constitutional: She is oriented to person, place, and time. She appears well-developed and well-nourished. No distress.   HENT:   Head: Normocephalic and atraumatic.   Eyes: EOM are normal. Pupils are equal, round, and reactive to light.   Neck: Normal range of motion.   Cardiovascular: Normal rate, regular rhythm and normal heart sounds.    No murmur heard.  Pulmonary/Chest: Effort normal and breath sounds normal. No stridor. No respiratory distress. She has no wheezes.   Abdominal: Soft. Bowel sounds are normal. She exhibits no distension.    Musculoskeletal: Normal range of motion. She exhibits no edema or tenderness.   Neurological: She is alert and oriented to person, place, and time. Coordination normal.   Skin: Skin is warm and dry. No erythema.   Psychiatric: She has a normal mood and affect. Her behavior is normal.   Nursing note and vitals reviewed.        CRANIAL NERVES     CN III, IV, VI   Pupils are equal, round, and reactive to light.  Extraocular motions are normal.        Significant Labs: All pertinent labs within the past 24 hours have been reviewed.    Significant Imaging: I have reviewed all pertinent imaging results/findings within the past 24 hours.

## 2018-05-23 NOTE — PLAN OF CARE
05/23/18 0802   Final Note   Assessment Type Final Discharge Note     Patient discharged home with no needs on 5/22/18.

## 2018-05-26 NOTE — DISCHARGE SUMMARY
"[facility] Hospital Medicine  Discharge Summary      Patient Name: Navjot Segovia  MRN: [id number]  Admission Date: 5/22/2018  Hospital Length of Stay: 0 days  Discharge Date and Time: 5/22/2018  6:50 PM  Attending Physician: Gunjan att. providers found   Discharging Provider: Denis Thompson PA-C  Primary Care Provider: Henrik Vazquez MD  [facility] Medicine Team: [facility]OS[facility] MED E Denis Thompson PA-C    HPI:   Navjot Segovia is a 43 y.o. lady with history of TIA (2011), GERD (untreated), hypertension who presented to the ED with complaints of mid-sternal chest pain described as a dull, squeezing sensation. She first noticed the pain on Sunday and it  has occurred intermittently since. This morning she awoke from her sleep with the chest pain and had an intense urge to vomit. She admits to radiation into her left shoulder (tightness) as well. She endorses increased stress at home and at work as she states she often finds herself "fighting back tears" at work. She reports shortness of breath for the past month, aggravated when lying flat or when she gets excited; these symptoms are not related to the episodes of chest pain. She denies diaphoresis, headache, vision changes, abdominal pain, belching, vomiting, constipation or diarrhea. She denies any cardiac history but reports that her father has had an MI in the past. She denies tobacco use. She reports a past cardiac work-up and stress test completed 7 years ago that was negative.     ED: Nitroglycerin administered with positive effect. Troponin's and BNP negative. Intake labs unremarkable. CXR unremarkable. EKG with NSR, 86 BPM.     * No surgery found *      Hospital Course:   Navjot Segovia was placed in observation for chest pain. 2D ECHO obtained with no wall motion abnormalities, EKG with NSR and troponin's unremarkable. Stress ECHO ordered at discharge. Patient stable for discharge home.      Consults:     No new Assessment & " Plan notes have been filed under this hospital service since the last note was generated.  Service: Hospital Medicine    Final Active Diagnoses:    Diagnosis Date Noted POA    PRINCIPAL PROBLEM:  Chest pain [R07.9] 05/22/2018 Yes    Hypertension [I10]  Yes    Depression [F32.9] 05/22/2018 Unknown      Problems Resolved During this Admission:    Diagnosis Date Noted Date Resolved POA       Discharged Condition: good    Disposition: Home or Self Care    Follow Up:  Follow-up Information     Henrik Vazquez MD On 6/6/2018.    Specialty:  Internal Medicine  Why:  at 8:20 AM  Contact information:  [place] [id number]  [phone]                 Patient Instructions:     Activity as tolerated     Notify your health care provider if you experience any of the following:  severe uncontrolled pain     Exercise stress echo   Standing Status: Future  Standing Exp. Date: 05/22/19         Significant Diagnostic Studies: Labs: All labs within the past 24 hours have been reviewed    Pending Diagnostic Studies:     None         Medications:  Reconciled Home Medications:      Medication List      START taking these medications    hydrOXYzine HCl 25 MG tablet  Commonly known as:  ATARAX  Take 1 tablet (25 mg total) by mouth 4 (four) times daily as needed for Anxiety.     nitroGLYCERIN 0.3 MG SL tablet  Commonly known as:  NITROSTAT  Place 1 tablet (0.3 mg total) under the tongue every 5 (five) minutes as needed for Chest pain.        CONTINUE taking these medications    amLODIPine 10 MG tablet  Commonly known as:  NORVASC  TAKE 1 TABLET (10 MG TOTAL) BY MOUTH ONCE DAILY.     carvedilol 12.5 MG tablet  Commonly known as:  COREG  Take 1 tablet (12.5 mg total) by mouth 2 (two) times daily with meals.     DULoxetine 30 MG capsule  Commonly known as:  CYMBALTA  Take 1 capsule (30 mg total) by mouth once daily.     fluocinonide 0.05 % external solution  Commonly known as:  LIDEX  APPLY TO AFFECTED AREA ONCE A DAY      mirtazapine 15 MG tablet  Commonly known as:  REMERON  Take 1 tablet (15 mg total) by mouth every evening.     multivitamin with minerals tablet  Take 1 tablet by mouth once daily.     naproxen 500 MG tablet  Commonly known as:  NAPROSYN  Take 1 tablet (500 mg total) by mouth daily as needed.     triamterene-hydrochlorothiazide 37.5-25 mg 37.5-25 mg per capsule  Commonly known as:  DYAZIDE  Take 1 capsule by mouth every morning.     valsartan 320 MG tablet  Commonly known as:  DIOVAN  Take 1 tablet (320 mg total) by mouth once daily.            Indwelling Lines/Drains at time of discharge:   Lines/Drains/Airways          No matching active lines, drains, or airways          Time spent on the discharge of patient: 30 minutes  Patient was seen and examined on the date of discharge and determined to be suitable for discharge.         Denis Thompson PA-C  Department of Hospital Medicine  [facility]

## 2018-06-07 DIAGNOSIS — I10 HYPERTENSION, ESSENTIAL: ICD-10-CM

## 2018-06-07 RX ORDER — VALSARTAN 320 MG/1
320 TABLET ORAL DAILY
Qty: 30 TABLET | Refills: 0 | Status: SHIPPED | OUTPATIENT
Start: 2018-06-07 | End: 2018-07-15 | Stop reason: SDUPTHER

## 2018-07-10 DIAGNOSIS — N30.90 CYSTITIS: Primary | ICD-10-CM

## 2018-07-10 DIAGNOSIS — B37.31 VAGINAL YEAST INFECTION: ICD-10-CM

## 2018-07-10 RX ORDER — FLUCONAZOLE 150 MG/1
150 TABLET ORAL
Qty: 6 TABLET | Refills: 0 | Status: SHIPPED | OUTPATIENT
Start: 2018-07-10 | End: 2019-02-27 | Stop reason: ALTCHOICE

## 2018-07-10 RX ORDER — SULFAMETHOXAZOLE AND TRIMETHOPRIM 800; 160 MG/1; MG/1
1 TABLET ORAL 2 TIMES DAILY
Qty: 20 TABLET | Refills: 0 | Status: SHIPPED | OUTPATIENT
Start: 2018-07-10 | End: 2018-07-20

## 2018-07-15 DIAGNOSIS — I10 HYPERTENSION, ESSENTIAL: ICD-10-CM

## 2018-07-16 RX ORDER — VALSARTAN 320 MG/1
320 TABLET ORAL DAILY
Qty: 30 TABLET | Refills: 0 | Status: SHIPPED | OUTPATIENT
Start: 2018-07-16 | End: 2018-08-17 | Stop reason: SDUPTHER

## 2018-08-17 ENCOUNTER — OFFICE VISIT (OUTPATIENT)
Dept: FAMILY MEDICINE | Facility: CLINIC | Age: 43
End: 2018-08-17
Payer: COMMERCIAL

## 2018-08-17 VITALS
TEMPERATURE: 98 F | HEIGHT: 66 IN | RESPIRATION RATE: 18 BRPM | OXYGEN SATURATION: 98 % | WEIGHT: 197.31 LBS | BODY MASS INDEX: 31.71 KG/M2 | SYSTOLIC BLOOD PRESSURE: 140 MMHG | DIASTOLIC BLOOD PRESSURE: 82 MMHG | HEART RATE: 85 BPM

## 2018-08-17 DIAGNOSIS — I10 HYPERTENSION, ESSENTIAL: ICD-10-CM

## 2018-08-17 DIAGNOSIS — G47.00 INSOMNIA, UNSPECIFIED TYPE: ICD-10-CM

## 2018-08-17 PROCEDURE — 3077F SYST BP >= 140 MM HG: CPT | Mod: CPTII,S$GLB,, | Performed by: FAMILY MEDICINE

## 2018-08-17 PROCEDURE — 99999 PR PBB SHADOW E&M-EST. PATIENT-LVL IV: CPT | Mod: PBBFAC,,, | Performed by: FAMILY MEDICINE

## 2018-08-17 PROCEDURE — 3079F DIAST BP 80-89 MM HG: CPT | Mod: CPTII,S$GLB,, | Performed by: FAMILY MEDICINE

## 2018-08-17 PROCEDURE — 3008F BODY MASS INDEX DOCD: CPT | Mod: CPTII,S$GLB,, | Performed by: FAMILY MEDICINE

## 2018-08-17 PROCEDURE — 99214 OFFICE O/P EST MOD 30 MIN: CPT | Mod: S$GLB,,, | Performed by: FAMILY MEDICINE

## 2018-08-17 RX ORDER — VALSARTAN 320 MG/1
320 TABLET ORAL DAILY
Qty: 30 TABLET | Refills: 0 | Status: SHIPPED | OUTPATIENT
Start: 2018-08-17 | End: 2018-09-23 | Stop reason: SDUPTHER

## 2018-08-17 RX ORDER — MIRTAZAPINE 7.5 MG/1
7.5 TABLET, FILM COATED ORAL NIGHTLY
Qty: 90 TABLET | Refills: 0 | Status: SHIPPED | OUTPATIENT
Start: 2018-08-17 | End: 2018-11-22 | Stop reason: SDUPTHER

## 2018-08-17 RX ORDER — AMLODIPINE BESYLATE 10 MG/1
TABLET ORAL
Qty: 30 TABLET | Refills: 3 | Status: CANCELLED | OUTPATIENT
Start: 2018-08-17

## 2018-08-17 RX ORDER — CARVEDILOL 12.5 MG/1
12.5 TABLET ORAL 2 TIMES DAILY WITH MEALS
Qty: 60 TABLET | Refills: 11 | Status: CANCELLED | OUTPATIENT
Start: 2018-08-17 | End: 2019-08-17

## 2018-08-17 RX ORDER — METOPROLOL SUCCINATE 100 MG/1
100 TABLET, EXTENDED RELEASE ORAL DAILY
Qty: 30 TABLET | Refills: 0 | Status: SHIPPED | OUTPATIENT
Start: 2018-08-17 | End: 2018-09-23 | Stop reason: SDUPTHER

## 2018-08-17 NOTE — PATIENT INSTRUCTIONS
Stop Amlodipine and Carvidelol  Start Metoprolol Succinate 100 mg once at bedtime, and continue Valsartan 320 mg once daily

## 2018-08-20 NOTE — PROGRESS NOTES
Routine Office Visit    Patient Name: Navjot Segovia    : 1975  MRN: [id number]    Subjective:  Navjot is a 43 y.o. female who presents today for:   Chief Complaint   Patient presents with    Hypertension    Follow-up    Medication Refill       43-year-old female with hypertension comes in for follow-up on this.  The the patient is supposed to be on carvedilol 12.5 mg b.i.d., amlodipine 10 mg daily, and valsartan 320 mg daily.  However she states that she only takes the carvedilol at night and has been doing so for a long time.  In addition, she states that for the last several years she has been having leg swelling.  When questioned, she does report that this started after she started amlodipine.  She denies any headaches, chest pain, palpitations, dizziness, or tremors.  The she denies blood in the urine or changes in her vision..  She states that she cannot take Ace inhibitors because she had a bad reaction to this before.  She also states that she cannot take hydrochlorothiazide because it made her face swell up.  Patient reports that her primary also put her on Remeron 7.5 mg nightly for insomnia to use as needed.  She is requesting a refill for this.    Past Medical History  Past Medical History:   Diagnosis Date    Anxiety     History of acne     Hx of psychiatric care     Hyperlipidemia     Hypertension     Keloid cicatrix     Psychiatric problem     Therapy        Past Surgical History  Past Surgical History:   Procedure Laterality Date     SECTION, CLASSIC      x 2    Laparoscopic bilateral tubal ligation.          Family History  Family History   Problem Relation Age of Onset    Hypertension Father     Heart disease Father 46    Aortic aneurysm Father         abdominal    Hypertension Mother     Depression Sister     Breast cancer Sister     Schizophrenia Maternal Uncle     Schizophrenia Cousin     Ovarian cancer Neg Hx        Social History  Social History      Socioeconomic History    Marital status:      Spouse name: Not on file    Number of children: 3    Years of education: Not on file    Highest education level: Not on file   Social Needs    Financial resource strain: Not on file    Food insecurity - worry: Not on file    Food insecurity - inability: Not on file    Transportation needs - medical: Not on file    Transportation needs - non-medical: Not on file   Occupational History    Occupation:    Tobacco Use    Smoking status: Never Smoker    Smokeless tobacco: Never Used   Substance and Sexual Activity    Alcohol use: Yes     Comment: Social; rare use    Drug use: No    Sexual activity: Yes     Partners: Male   Other Topics Concern    Are you pregnant or think you may be? Not Asked    Breast-feeding Not Asked    Patient feels they ought to cut down on drinking/drug use Not Asked    Patient annoyed by others criticizing their drinking/drug use Not Asked    Patient has felt bad or guilty about drinking/drug use Not Asked    Patient has had a drink/used drugs as an eye opener in the AM Not Asked   Social History Narrative    Not on file       Current Medications  Current Outpatient Medications on File Prior to Visit   Medication Sig Dispense Refill    fluocinonide (LIDEX) 0.05 % external solution APPLY TO AFFECTED AREA ONCE A DAY  4    multivitamin with minerals tablet Take 1 tablet by mouth once daily.      naproxen (NAPROSYN) 500 MG tablet Take 1 tablet (500 mg total) by mouth daily as needed. 60 tablet 0    nitroGLYCERIN (NITROSTAT) 0.3 MG SL tablet Place 1 tablet (0.3 mg total) under the tongue every 5 (five) minutes as needed for Chest pain. 10 tablet 0    duloxetine (CYMBALTA) 30 MG capsule Take 1 capsule (30 mg total) by mouth once daily. 30 capsule 1    fluconazole (DIFLUCAN) 150 MG Tab Take 1 tablet (150 mg total) by mouth as needed. Take one pill weekly as needed for yeast infection. 6 tablet 0  "    No current facility-administered medications on file prior to visit.        Allergies   Review of patient's allergies indicates:   Allergen Reactions    Ace inhibitors      Other reaction(s): cough     Review of Systems   Constitutional: Negative for unexpected weight change.   HENT: Negative for ear pain and sore throat.    Eyes: Negative for visual disturbance.   Respiratory: Negative for shortness of breath.    Cardiovascular: Negative for chest pain.   Gastrointestinal: Negative for abdominal pain and blood in stool.   Endocrine: Negative for cold intolerance and heat intolerance.   Genitourinary: Negative for dysuria and frequency.   Skin: Negative for rash.   Neurological: Negative for weakness, numbness and headaches.   Hematological: Negative for adenopathy.   Psychiatric/Behavioral: Negative for suicidal ideas.       BP (!) 140/82 (BP Location: Left arm, Patient Position: Sitting, BP Method: Medium (Manual))   Pulse 85   Temp 98.4 °F (36.9 °C) (Oral)   Resp 18   Ht 5' 6" (1.676 m)   Wt 89.5 kg (197 lb 5 oz)   LMP 07/17/2018 (Exact Date)   SpO2 98%   BMI 31.85 kg/m²     Physical Exam   Constitutional: She appears well-developed and well-nourished.   HENT:   Head: Normocephalic and atraumatic.   Right Ear: External ear normal.   Left Ear: External ear normal.   Nose: Nose normal.   Mouth/Throat: Oropharynx is clear and moist. No oropharyngeal exudate.   Eyes: Conjunctivae and EOM are normal. Pupils are equal, round, and reactive to light. Right eye exhibits no discharge. Left eye exhibits no discharge.   Neck: Normal range of motion. Neck supple. No tracheal deviation present.   Cardiovascular: Normal rate, regular rhythm, normal heart sounds and intact distal pulses.   No murmur heard.  Pulmonary/Chest: Effort normal and breath sounds normal. She has no wheezes. She has no rales.   Abdominal: Soft. Bowel sounds are normal. She exhibits no mass. There is no tenderness.   Lymphadenopathy:     She " has no cervical adenopathy.   Psychiatric: She has a normal mood and affect.   Vitals reviewed.        Assessment/Plan:  Navjot was seen today for hypertension, follow-up and medication refill.    Diagnoses and all orders for this visit:    Hypertension, essential  -     valsartan (DIOVAN) 320 MG tablet; Take 1 tablet (320 mg total) by mouth once daily.  -     metoprolol succinate (TOPROL-XL) 100 MG 24 hr tablet; Take 1 tablet (100 mg total) by mouth once daily.  I reviewed the EKG that was done in May 2018.  I discussed with the patient carvedilol is a twice a day medication and does not work well as a once a day medication.  I will switch her over to Toprol-XL which is a once a day medication.  Also she is requesting to discontinue amlodipine because of the leg swelling. As such I will put her on Toprol  mg and continue valsartan 320 mg and titrate from there.  I discussed with her possible side effects.  She is to follow up in the office for blood pressure check with the nurse next week.    Insomnia, unspecified type  -     mirtazapine (REMERON) 7.5 MG Tab; Take 1 tablet (7.5 mg total) by mouth every evening.  Continue regimen recommended by her primary.      This office note has been dictated.  This dictation has been generated using M-Modal Fluency Direct dictation; some phonetic errors may occur.

## 2018-08-21 RX ORDER — AMLODIPINE BESYLATE 10 MG/1
TABLET ORAL
Qty: 30 TABLET | Refills: 1 | OUTPATIENT
Start: 2018-08-21

## 2018-09-23 DIAGNOSIS — I10 HYPERTENSION, ESSENTIAL: ICD-10-CM

## 2018-09-25 RX ORDER — VALSARTAN 320 MG/1
320 TABLET ORAL DAILY
Qty: 30 TABLET | Refills: 0 | Status: SHIPPED | OUTPATIENT
Start: 2018-09-25 | End: 2018-11-09 | Stop reason: SDUPTHER

## 2018-09-25 RX ORDER — METOPROLOL SUCCINATE 100 MG/1
TABLET, EXTENDED RELEASE ORAL
Qty: 30 TABLET | Refills: 0 | Status: SHIPPED | OUTPATIENT
Start: 2018-09-25 | End: 2018-11-09 | Stop reason: SDUPTHER

## 2018-09-25 NOTE — TELEPHONE ENCOUNTER
Patient never returned for blood pressure check.  The I will send a 1 time refill, however patient needs to return for a blood pressure check.

## 2018-10-10 DIAGNOSIS — I10 HYPERTENSION, ESSENTIAL: ICD-10-CM

## 2018-10-10 RX ORDER — VALSARTAN 320 MG/1
320 TABLET ORAL DAILY
Qty: 30 TABLET | Refills: 0 | OUTPATIENT
Start: 2018-10-10

## 2018-10-10 RX ORDER — METOPROLOL SUCCINATE 100 MG/1
100 TABLET, EXTENDED RELEASE ORAL DAILY
Qty: 30 TABLET | Refills: 0 | OUTPATIENT
Start: 2018-10-10

## 2018-11-07 DIAGNOSIS — I10 HYPERTENSION, ESSENTIAL: ICD-10-CM

## 2018-11-07 RX ORDER — METOPROLOL SUCCINATE 100 MG/1
TABLET, EXTENDED RELEASE ORAL
Qty: 30 TABLET | Refills: 0 | Status: CANCELLED | OUTPATIENT
Start: 2018-11-07

## 2018-11-07 RX ORDER — VALSARTAN 320 MG/1
320 TABLET ORAL DAILY
Qty: 30 TABLET | Refills: 0 | Status: CANCELLED | OUTPATIENT
Start: 2018-11-07

## 2018-11-09 DIAGNOSIS — I10 HYPERTENSION, ESSENTIAL: ICD-10-CM

## 2018-11-09 RX ORDER — METOPROLOL SUCCINATE 100 MG/1
100 TABLET, EXTENDED RELEASE ORAL DAILY
Qty: 30 TABLET | Refills: 2 | Status: SHIPPED | OUTPATIENT
Start: 2018-11-09 | End: 2019-01-31

## 2018-11-09 RX ORDER — VALSARTAN 320 MG/1
320 TABLET ORAL DAILY
Qty: 30 TABLET | Refills: 2 | Status: SHIPPED | OUTPATIENT
Start: 2018-11-09 | End: 2019-02-18 | Stop reason: SDUPTHER

## 2018-11-09 NOTE — TELEPHONE ENCOUNTER
"----- Message from Sheyla Miller sent at 11/9/2018 11:30 AM CST -----  Contact: Eladia [facility]  [phone]  Following up on a refill request for valsartan (DIOVAN) 320 MG tablet & metoprolol succinate (TOPROL-XL) 100 MG 24 hr tablet  [facility] [address] [facility] [phone]    "

## 2018-11-22 DIAGNOSIS — G47.00 INSOMNIA, UNSPECIFIED TYPE: ICD-10-CM

## 2018-11-26 RX ORDER — MIRTAZAPINE 7.5 MG/1
7.5 TABLET, FILM COATED ORAL NIGHTLY
Qty: 90 TABLET | Refills: 0 | Status: SHIPPED | OUTPATIENT
Start: 2018-11-26 | End: 2019-12-16

## 2019-01-31 ENCOUNTER — OFFICE VISIT (OUTPATIENT)
Dept: FAMILY MEDICINE | Facility: CLINIC | Age: 44
End: 2019-01-31
Payer: COMMERCIAL

## 2019-01-31 VITALS
HEART RATE: 72 BPM | TEMPERATURE: 98 F | DIASTOLIC BLOOD PRESSURE: 88 MMHG | HEIGHT: 66 IN | BODY MASS INDEX: 31.78 KG/M2 | RESPIRATION RATE: 18 BRPM | WEIGHT: 197.75 LBS | OXYGEN SATURATION: 98 % | SYSTOLIC BLOOD PRESSURE: 152 MMHG

## 2019-01-31 DIAGNOSIS — F32.A DEPRESSION, UNSPECIFIED DEPRESSION TYPE: ICD-10-CM

## 2019-01-31 DIAGNOSIS — R07.9 CHEST PAIN, UNSPECIFIED TYPE: ICD-10-CM

## 2019-01-31 DIAGNOSIS — I10 HYPERTENSION, ESSENTIAL: Primary | ICD-10-CM

## 2019-01-31 DIAGNOSIS — E78.5 DYSLIPIDEMIA: ICD-10-CM

## 2019-01-31 DIAGNOSIS — R51.9 ACUTE NONINTRACTABLE HEADACHE, UNSPECIFIED HEADACHE TYPE: ICD-10-CM

## 2019-01-31 DIAGNOSIS — F41.9 ANXIETY: ICD-10-CM

## 2019-01-31 PROCEDURE — 3079F PR MOST RECENT DIASTOLIC BLOOD PRESSURE 80-89 MM HG: ICD-10-PCS | Mod: CPTII,S$GLB,, | Performed by: FAMILY MEDICINE

## 2019-01-31 PROCEDURE — 93005 ELECTROCARDIOGRAM TRACING: CPT | Mod: S$GLB,,, | Performed by: FAMILY MEDICINE

## 2019-01-31 PROCEDURE — 93005 EKG 12-LEAD: ICD-10-PCS | Mod: S$GLB,,, | Performed by: FAMILY MEDICINE

## 2019-01-31 PROCEDURE — 3079F DIAST BP 80-89 MM HG: CPT | Mod: CPTII,S$GLB,, | Performed by: FAMILY MEDICINE

## 2019-01-31 PROCEDURE — 3008F BODY MASS INDEX DOCD: CPT | Mod: CPTII,S$GLB,, | Performed by: FAMILY MEDICINE

## 2019-01-31 PROCEDURE — 3077F PR MOST RECENT SYSTOLIC BLOOD PRESSURE >= 140 MM HG: ICD-10-PCS | Mod: CPTII,S$GLB,, | Performed by: FAMILY MEDICINE

## 2019-01-31 PROCEDURE — 96372 THER/PROPH/DIAG INJ SC/IM: CPT | Mod: S$GLB,,, | Performed by: FAMILY MEDICINE

## 2019-01-31 PROCEDURE — 96372 PR INJECTION,THERAP/PROPH/DIAG2ST, IM OR SUBCUT: ICD-10-PCS | Mod: S$GLB,,, | Performed by: FAMILY MEDICINE

## 2019-01-31 PROCEDURE — 99215 PR OFFICE/OUTPT VISIT, EST, LEVL V, 40-54 MIN: ICD-10-PCS | Mod: 25,S$GLB,, | Performed by: FAMILY MEDICINE

## 2019-01-31 PROCEDURE — 99215 OFFICE O/P EST HI 40 MIN: CPT | Mod: 25,S$GLB,, | Performed by: FAMILY MEDICINE

## 2019-01-31 PROCEDURE — 93010 ELECTROCARDIOGRAM REPORT: CPT | Mod: S$GLB,,, | Performed by: INTERNAL MEDICINE

## 2019-01-31 PROCEDURE — 3008F PR BODY MASS INDEX (BMI) DOCUMENTED: ICD-10-PCS | Mod: CPTII,S$GLB,, | Performed by: FAMILY MEDICINE

## 2019-01-31 PROCEDURE — 3077F SYST BP >= 140 MM HG: CPT | Mod: CPTII,S$GLB,, | Performed by: FAMILY MEDICINE

## 2019-01-31 PROCEDURE — 99999 PR PBB SHADOW E&M-EST. PATIENT-LVL V: CPT | Mod: PBBFAC,,, | Performed by: FAMILY MEDICINE

## 2019-01-31 PROCEDURE — 93010 EKG 12-LEAD: ICD-10-PCS | Mod: S$GLB,,, | Performed by: INTERNAL MEDICINE

## 2019-01-31 PROCEDURE — 99999 PR PBB SHADOW E&M-EST. PATIENT-LVL V: ICD-10-PCS | Mod: PBBFAC,,, | Performed by: FAMILY MEDICINE

## 2019-01-31 RX ORDER — MUPIROCIN 20 MG/G
OINTMENT TOPICAL
Refills: 1 | COMMUNITY
Start: 2019-01-22 | End: 2019-06-24

## 2019-01-31 RX ORDER — FLUOXETINE HYDROCHLORIDE 20 MG/1
20 CAPSULE ORAL DAILY
Qty: 30 CAPSULE | Refills: 1 | Status: SHIPPED | OUTPATIENT
Start: 2019-01-31 | End: 2019-02-18 | Stop reason: SDUPTHER

## 2019-01-31 RX ORDER — METOPROLOL SUCCINATE 100 MG/1
200 TABLET, EXTENDED RELEASE ORAL DAILY
Qty: 30 TABLET | Refills: 0
Start: 2019-01-31 | End: 2019-03-29 | Stop reason: SDUPTHER

## 2019-01-31 RX ORDER — KETOCONAZOLE 20 MG/ML
SHAMPOO, SUSPENSION TOPICAL
Refills: 2 | COMMUNITY
Start: 2019-01-22

## 2019-01-31 RX ORDER — BUTALBITAL, ACETAMINOPHEN AND CAFFEINE 50; 325; 40 MG/1; MG/1; MG/1
1 TABLET ORAL EVERY 6 HOURS PRN
Qty: 20 TABLET | Refills: 0 | Status: SHIPPED | OUTPATIENT
Start: 2019-01-31 | End: 2019-02-05

## 2019-01-31 RX ORDER — KETOROLAC TROMETHAMINE 30 MG/ML
30 INJECTION, SOLUTION INTRAMUSCULAR; INTRAVENOUS
Status: COMPLETED | OUTPATIENT
Start: 2019-01-31 | End: 2019-01-31

## 2019-01-31 RX ORDER — TRETINOIN 0.25 MG/G
CREAM TOPICAL
Refills: 3 | COMMUNITY
Start: 2019-01-23

## 2019-01-31 RX ADMIN — KETOROLAC TROMETHAMINE 30 MG: 30 INJECTION, SOLUTION INTRAMUSCULAR; INTRAVENOUS at 04:01

## 2019-02-01 NOTE — PROGRESS NOTES
Routine Office Visit    Patient Name: Navjot Segovia    : 1975  MRN: [id number]    Subjective:  Navjot is a 43 y.o. female who presents today for:   Chief Complaint   Patient presents with    Migraine     temperal 1 wk onset       43-year-old female with a history of hypertension, and recurrent headaches comes in for evaluation of 1 week of severe temporal headaches.  She states that sometimes there on the left and sometimes they are on the right.  She states that they are improved with over-the-counter Excedrin, but then the headaches come back.  The patient is adamant that they are not related to her blood pressure as her blood pressures have been much better than they have been in the past.  She insists that she is compliant with her Toprol  mg and her valsartan 320 mg.  She has a long history of uncontrolled hypertension and has been on multiple medications.  She reports that she has been seen in the emergency room for headaches in the past.  The patient had been seen by Neurology in as well.  This was several years ago.  The patient had also been evaluated by Psychiatry for anxiety and depression.  She had been on a Cymbalta as well as Zoloft in the past.  She reports that both of these made her not feel herself and both were extremely sedating.  She would like to see a new psychiatrist as she did not feel she was getting the care she needed from the last one.  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  Throughout her visit, she expresses that for several weeks she has been having on and off chest pain a and then expresses that this has been an issue for several years.  The she states that she had been informed that she needed to see a cardiologist in the past, but never went.    Past Medical History  Past Medical History:   Diagnosis Date    Anxiety     History of acne     Hx of psychiatric care     Hyperlipidemia     Hypertension      Keloid cicatrix     Psychiatric problem     Therapy        Past Surgical History  Past Surgical History:   Procedure Laterality Date    ABLATION-ENDOMETRIAL-THERMAL NOVASURE N/A 2015    [facility]     SECTION, CLASSIC      x 2    HVARXIJRYOMF-[id number] N/A 2015    [facility] at [facility]    Laparoscopic bilateral tubal ligation.          Family History  Family History   Problem Relation Age of Onset    Hypertension Father     Heart disease Father 46    Aortic aneurysm Father         abdominal    Hypertension Mother     Depression Sister     Breast cancer Sister     Schizophrenia Maternal Uncle     Schizophrenia Cousin     Ovarian cancer Neg Hx        Social History  Social History     Socioeconomic History    Marital status:      Spouse name: Not on file    Number of children: 3    Years of education: Not on file    Highest education level: Not on file   Social Needs    Financial resource strain: Not on file    Food insecurity - worry: Not on file    Food insecurity - inability: Not on file    Transportation needs - medical: Not on file    Transportation needs - non-medical: Not on file   Occupational History    Occupation:    Tobacco Use    Smoking status: Never Smoker    Smokeless tobacco: Never Used   Substance and Sexual Activity    Alcohol use: Yes     Comment: Social; rare use    Drug use: No    Sexual activity: Yes     Partners: Male   Other Topics Concern    Are you pregnant or think you may be? Not Asked    Breast-feeding Not Asked    Patient feels they ought to cut down on drinking/drug use Not Asked    Patient annoyed by others criticizing their drinking/drug use Not Asked    Patient has felt bad or guilty about drinking/drug use Not Asked    Patient has had a drink/used drugs as an eye opener in the AM Not Asked   Social History Narrative    Not on file        Current Medications  Current Outpatient Medications on File Prior to Visit   Medication Sig Dispense Refill    fluocinonide (LIDEX) 0.05 % external solution APPLY TO AFFECTED AREA ONCE A DAY  4    mirtazapine (REMERON) 7.5 MG Tab TAKE 1 TABLET (7.5 MG TOTAL) BY MOUTH EVERY EVENING. 90 tablet 0    multivitamin with minerals tablet Take 1 tablet by mouth once daily.      naproxen (NAPROSYN) 500 MG tablet Take 1 tablet (500 mg total) by mouth daily as needed. 60 tablet 0    nitroGLYCERIN (NITROSTAT) 0.3 MG SL tablet Place 1 tablet (0.3 mg total) under the tongue every 5 (five) minutes as needed for Chest pain. 10 tablet 0    valsartan (DIOVAN) 320 MG tablet Take 1 tablet (320 mg total) by mouth once daily. 30 tablet 2    [DISCONTINUED] metoprolol succinate (TOPROL-XL) 100 MG 24 hr tablet Take 1 tablet (100 mg total) by mouth once daily. 30 tablet 2    duloxetine (CYMBALTA) 30 MG capsule Take 1 capsule (30 mg total) by mouth once daily. 30 capsule 1    fluconazole (DIFLUCAN) 150 MG Tab Take 1 tablet (150 mg total) by mouth as needed. Take one pill weekly as needed for yeast infection. 6 tablet 0    ketoconazole (NIZORAL) 2 % shampoo APPLY TO THE SCALP 2 TIMES WEEKLY  2    mupirocin (BACTROBAN) 2 % ointment APPLY TO AFFECTED AREA (CHEST) TWICE A DAY  1    tretinoin (RETIN-A) 0.025 % cream APPLY THIN LAYER TO FACE AT BEDTIME  3     No current facility-administered medications on file prior to visit.        Allergies   Review of patient's allergies indicates:   Allergen Reactions    Ace inhibitors      Other reaction(s): cough       Review of Systems   Constitutional: Negative for chills, fever and unexpected weight change.   HENT: Negative for congestion, ear pain and sore throat.    Eyes: Negative for pain, discharge and visual disturbance.   Respiratory: Negative for cough, shortness of breath and wheezing.    Cardiovascular: Positive for chest pain and palpitations.   Gastrointestinal: Positive for  "constipation. Negative for abdominal pain, blood in stool, diarrhea, nausea and vomiting.   Endocrine: Negative for cold intolerance and heat intolerance.   Genitourinary: Negative for dysuria, frequency and hematuria.   Skin: Negative for rash.   Allergic/Immunologic: Negative for immunocompromised state.   Neurological: Positive for dizziness. Negative for weakness, numbness and headaches.   Hematological: Negative for adenopathy.   Psychiatric/Behavioral: Positive for decreased concentration, dysphoric mood and sleep disturbance. Negative for suicidal ideas. The patient is nervous/anxious.        BP (!) 152/88 (BP Location: Right arm, Patient Position: Sitting, BP Method: Medium (Manual))   Pulse 72   Temp 98.2 °F (36.8 °C) (Oral)   Resp 18   Ht 5' 6" (1.676 m)   Wt 89.7 kg (197 lb 12 oz)   SpO2 98%   BMI 31.92 kg/m²     Physical Exam   Constitutional: She appears well-developed and well-nourished.   HENT:   Head: Normocephalic and atraumatic.   Right Ear: External ear normal.   Left Ear: External ear normal.   Nose: Nose normal.   Mouth/Throat: Oropharynx is clear and moist. No oropharyngeal exudate.   Eyes: Conjunctivae and EOM are normal. Pupils are equal, round, and reactive to light. Right eye exhibits no discharge. Left eye exhibits no discharge.   Funduscopic exam shows no the papilledema or AV nicking   Neck: Normal range of motion. Neck supple. No tracheal deviation present.   Cardiovascular: Normal rate, regular rhythm, normal heart sounds and intact distal pulses.   No murmur heard.  Pulmonary/Chest: Effort normal and breath sounds normal. She has no wheezes. She has no rales.   Abdominal: Soft. Bowel sounds are normal. She exhibits no mass. There is no tenderness.   Lymphadenopathy:     She has no cervical adenopathy.   Neurological: She displays normal reflexes. No cranial nerve deficit (CN II-XII Grossly intact) or sensory deficit. She exhibits normal muscle tone.   Psychiatric: Judgment " normal. Her mood appears anxious. Her speech is not rapid and/or pressured, not delayed, not tangential and not slurred. She is not agitated, not aggressive, not hyperactive, not withdrawn and not actively hallucinating. Thought content is not paranoid. Cognition and memory are normal. She exhibits a depressed mood. She expresses no homicidal and no suicidal ideation. She is attentive.   Vitals reviewed.      Assessment/Plan:  Navjot was seen today for migraine.    Diagnoses and all orders for this visit:    Hypertension, essential  -     IN OFFICE EKG 12-LEAD (to [place])  -     Comprehensive metabolic panel; Future  -     CBC auto differential; Future  -     metoprolol succinate (TOPROL-XL) 100 MG 24 hr tablet; Take 2 tablets (200 mg total) by mouth once daily.  EKG done in office shows no acute abnormalities.  Will continue valsartan 320 mg, and increase Toprol XL to 200 mg daily.  I discussed with the patient that her chest pains and her headaches are in part likely due to her blood pressure not being controlled.    Order for digital hypertension placed  The patient agrees to message me with her blood pressure readings from Monday/Tuesday/Wednesday next Thursday.      Chest pain, unspecified type  -     IN OFFICE EKG 12-LEAD (to [place])  -     Ambulatory referral to Cardiology  Discussed with patient that her chest pain may be multifactorial including from uncontrolled blood pressure and from her mental health concerns a including anxiety/depression.  However I will also request a cardiology input.    Acute nonintractable headache, unspecified headache type  -     butalbital-acetaminophen-caffeine -40 mg (FIORICET, ESGIC) -40 mg per tablet; Take 1 tablet by mouth every 6 (six) hours as needed for Pain.  -     ketorolac injection 30 mg  -     Ambulatory referral to Neurology  Discussed with patient that I would like to get her blood pressure under better control as there is likely a big component from her  blood pressure being uncontrolled.  Patient is doubtful of this, and she expresses that this is the best her blood pressure has been.  We discussed a 1 time a Toradol injection, which she states she has tolerated in the past.  I discussed a very short course of Fioricet.  Discussed side effects of these.  Neurology consult requested    Dyslipidemia  -     Lipid panel; Future    Anxiety  -     Ambulatory referral to Psychiatry  -     FLUoxetine 20 MG capsule; Take 1 capsule (20 mg total) by mouth once daily.  We discussed SSRI use.  Discussed using fluoxetine as it is not sedating as the other medications she has used in the past.  Patient to follow up with Psychiatry.    Depression, unspecified depression type  -     Ambulatory referral to Psychiatry  -     FLUoxetine 20 MG capsule; Take 1 capsule (20 mg total) by mouth once daily.  As above.        This office note has been dictated.  This dictation has been generated using M-Modal Fluency Direct dictation; some phonetic errors may occur.

## 2019-02-05 ENCOUNTER — PATIENT MESSAGE (OUTPATIENT)
Dept: ADMINISTRATIVE | Facility: OTHER | Age: 44
End: 2019-02-05

## 2019-02-06 ENCOUNTER — TELEPHONE (OUTPATIENT)
Dept: PSYCHIATRY | Facility: CLINIC | Age: 44
End: 2019-02-06

## 2019-02-06 ENCOUNTER — PATIENT MESSAGE (OUTPATIENT)
Dept: FAMILY MEDICINE | Facility: CLINIC | Age: 44
End: 2019-02-06

## 2019-02-06 NOTE — TELEPHONE ENCOUNTER
Patient called and left voice mail to schedule appointment with Ravin Guaman NP. Called to notify patient that as she has already established care with Dr Cohen we are not able to schedule her here to see the other Psychiatry provider.

## 2019-02-07 ENCOUNTER — PATIENT MESSAGE (OUTPATIENT)
Dept: FAMILY MEDICINE | Facility: CLINIC | Age: 44
End: 2019-02-07

## 2019-02-07 ENCOUNTER — OFFICE VISIT (OUTPATIENT)
Dept: CARDIOLOGY | Facility: CLINIC | Age: 44
End: 2019-02-07
Payer: COMMERCIAL

## 2019-02-07 VITALS
BODY MASS INDEX: 31.67 KG/M2 | WEIGHT: 196.19 LBS | DIASTOLIC BLOOD PRESSURE: 104 MMHG | SYSTOLIC BLOOD PRESSURE: 174 MMHG | OXYGEN SATURATION: 96 % | RESPIRATION RATE: 20 BRPM | HEART RATE: 54 BPM

## 2019-02-07 DIAGNOSIS — E78.2 MIXED HYPERLIPIDEMIA: ICD-10-CM

## 2019-02-07 DIAGNOSIS — F32.0 CURRENT MILD EPISODE OF MAJOR DEPRESSIVE DISORDER WITHOUT PRIOR EPISODE: ICD-10-CM

## 2019-02-07 DIAGNOSIS — I10 ESSENTIAL HYPERTENSION: Primary | ICD-10-CM

## 2019-02-07 DIAGNOSIS — I10 ESSENTIAL HYPERTENSION: ICD-10-CM

## 2019-02-07 DIAGNOSIS — R07.9 CHEST PAIN: ICD-10-CM

## 2019-02-07 DIAGNOSIS — R07.9 CHEST PAIN, UNSPECIFIED TYPE: Primary | ICD-10-CM

## 2019-02-07 PROCEDURE — 99204 OFFICE O/P NEW MOD 45 MIN: CPT | Mod: S$GLB,,, | Performed by: INTERNAL MEDICINE

## 2019-02-07 PROCEDURE — 3080F PR MOST RECENT DIASTOLIC BLOOD PRESSURE >= 90 MM HG: ICD-10-PCS | Mod: CPTII,S$GLB,, | Performed by: INTERNAL MEDICINE

## 2019-02-07 PROCEDURE — 99999 PR PBB SHADOW E&M-EST. PATIENT-LVL III: ICD-10-PCS | Mod: PBBFAC,,, | Performed by: INTERNAL MEDICINE

## 2019-02-07 PROCEDURE — 93000 EKG 12-LEAD: ICD-10-PCS | Mod: S$GLB,,, | Performed by: INTERNAL MEDICINE

## 2019-02-07 PROCEDURE — 3008F PR BODY MASS INDEX (BMI) DOCUMENTED: ICD-10-PCS | Mod: CPTII,S$GLB,, | Performed by: INTERNAL MEDICINE

## 2019-02-07 PROCEDURE — 3008F BODY MASS INDEX DOCD: CPT | Mod: CPTII,S$GLB,, | Performed by: INTERNAL MEDICINE

## 2019-02-07 PROCEDURE — 99999 PR PBB SHADOW E&M-EST. PATIENT-LVL III: CPT | Mod: PBBFAC,,, | Performed by: INTERNAL MEDICINE

## 2019-02-07 PROCEDURE — 3077F PR MOST RECENT SYSTOLIC BLOOD PRESSURE >= 140 MM HG: ICD-10-PCS | Mod: CPTII,S$GLB,, | Performed by: INTERNAL MEDICINE

## 2019-02-07 PROCEDURE — 3080F DIAST BP >= 90 MM HG: CPT | Mod: CPTII,S$GLB,, | Performed by: INTERNAL MEDICINE

## 2019-02-07 PROCEDURE — 3077F SYST BP >= 140 MM HG: CPT | Mod: CPTII,S$GLB,, | Performed by: INTERNAL MEDICINE

## 2019-02-07 PROCEDURE — 93000 ELECTROCARDIOGRAM COMPLETE: CPT | Mod: S$GLB,,, | Performed by: INTERNAL MEDICINE

## 2019-02-07 PROCEDURE — 99204 PR OFFICE/OUTPT VISIT, NEW, LEVL IV, 45-59 MIN: ICD-10-PCS | Mod: S$GLB,,, | Performed by: INTERNAL MEDICINE

## 2019-02-07 RX ORDER — CHLORTHALIDONE 25 MG/1
25 TABLET ORAL DAILY
Qty: 30 TABLET | Refills: 11 | Status: SHIPPED | OUTPATIENT
Start: 2019-02-07 | End: 2019-12-16 | Stop reason: SDUPTHER

## 2019-02-07 NOTE — LETTER
February 7, 2019      Jietndra Hanson Jr., MD  [address] [facility] [address]           [facility] [place] [facility] - Cardiology  [address] [facility] [address]  [facility] [address]  Phone: [phone]          Patient: Navjot Segovia   MR Number: [id number]   YOB: 1975   Date of Visit: 2/7/2019       Dear Dr. Jitendra Hanson Jr.:    Thank you for referring Navjot Segovia to me for evaluation. Attached you will find relevant portions of my assessment and plan of care.    If you have questions, please do not hesitate to call me. I look forward to following Navjot Segovia along with you.    Sincerely,    Noam Schmitz MD    Enclosure  CC:  No Recipients    If you would like to receive this communication electronically, please contact [email or web address] or [phone] to request more information on [employer] Link access.    For providers and/or their staff who would like to refer a patient to [facility], please contact us through our one-stop-shop provider referral line, [facility] , at [phone].    If you feel you have received this communication in error or would no longer like to receive these types of communications, please e-mail [email or web address]

## 2019-02-07 NOTE — PROGRESS NOTES
Subjective:    Patient ID:  Navjot Segovia is a 43 y.o. female who presents for evaluation of Consult; Chest Congestion; Dizziness; and Nausea      HPI  Here for evaluation chest pain.  Set on and off symptoms of gotten worse the last couple weeks.  She says they can happen at rest or with activity.  Signs blood pressure is also been fluctuating quite a bit and she was recently ruled and digital hypertension clinic.  She denies any other associated symptoms.  She has not take anything makes it better or worse.  She has had on and off chest pain symptoms over several years and had previous ischemic workup in 2013 which was negative.  She did have an admission to the ER last year at [facility] for chest pain but no workup was done at that time.  She denies any PND, orthopnea or lower extremity edema.  She denies any dizziness, presyncope or syncope.  She works in the [employer] department also with [id number].    Review of Systems   Constitution: Negative.   HENT: Negative.    Eyes: Negative.    Cardiovascular: Positive for chest pain. Negative for dyspnea on exertion, irregular heartbeat, leg swelling, near-syncope, orthopnea, palpitations, paroxysmal nocturnal dyspnea and syncope.   Respiratory: Negative for shortness of breath.    Skin: Negative.    Musculoskeletal: Negative.    Gastrointestinal: Negative for abdominal pain, constipation and diarrhea.   Genitourinary: Negative for dysuria.   Neurological: Negative.    Psychiatric/Behavioral: Negative.      Past Medical History:   Diagnosis Date    Anxiety     History of acne     Hx of psychiatric care     Hyperlipidemia     Hypertension     Keloid cicatrix     Psychiatric problem     Therapy      Past Surgical History:   Procedure Laterality Date    ABLATION-ENDOMETRIAL-THERMAL NOVASURE N/A 2015    [facility]     SECTION, CLASSIC      x 2    [id number] N/A 2015    Performed by  [facility] at [facility] OR    Laparoscopic bilateral tubal ligation.       Social History     Tobacco Use    Smoking status: Never Smoker    Smokeless tobacco: Never Used   Substance Use Topics    Alcohol use: Yes     Comment: Social; rare use    Drug use: No     Family History   Problem Relation Age of Onset    Hypertension Father     Heart disease Father 46    Aortic aneurysm Father         abdominal    Hypertension Mother     Depression Sister     Breast cancer Sister     Schizophrenia Maternal Uncle     Schizophrenia Cousin     Ovarian cancer Neg Hx         Objective:    Physical Exam   Constitutional: She is oriented to person, place, and time. She appears well-developed and well-nourished.   HENT:   Head: Normocephalic and atraumatic.   Eyes: Conjunctivae and EOM are normal. Pupils are equal, round, and reactive to light.   Neck: Normal range of motion. Neck supple. No thyromegaly present.   Cardiovascular: Normal rate and regular rhythm.   No murmur heard.  Pulmonary/Chest: Effort normal and breath sounds normal. No respiratory distress.   Abdominal: Soft. Bowel sounds are normal.   Musculoskeletal: She exhibits no edema.   Neurological: She is alert and oriented to person, place, and time.   Skin: Skin is warm and dry.   Psychiatric: She has a normal mood and affect. Her behavior is normal.       ekg normal sinus rhythm with nonspecific ST-T changes    Assessment:       1. Chest pain, unspecified type    2. Essential hypertension    3. Mixed hyperlipidemia    4. Current mild episode of major depressive disorder without prior episode         Plan:       -plan for baseline stress echo with current symptoms and risk factors  -kidney artery ultrasound  -enrolled in digital hypertension clinic  -refer back to Psychiatry of testing negative    Return to clinic in 1 month with testing now

## 2019-02-08 ENCOUNTER — PATIENT OUTREACH (OUTPATIENT)
Dept: OTHER | Facility: OTHER | Age: 44
End: 2019-02-08

## 2019-02-08 NOTE — LETTER
February 8, 2019     Navjot Segovia  [address]  [place] [address]       Dear Navjot,    Welcome to [facility]! Our goal is to make care effective, proactive and convenient by using data you send us from home to better treat your chronic conditions.          My name is Katelyn Scales, and I am your dedicated Digital Medicine clinician. As an expert in medication management, I will help ensure that the medications you are taking continue to provide the intended benefits and help you reach your goals. You can reach me directly at [phone] or by sending me a message directly through your [facility] account.        I am Cayla Vick and I will be your health . My job is to help you identify lifestyle changes to improve your disease control. We will talk about nutrition, exercise, and other ways you may be able to adjust your current habits to better your health. Additionally, we will help ensure you are completing the tests and screenings that are necessary to help manage your conditions. You can reach me directly at  or by sending me a message directly through your [facility] account.    Most importantly, YOU are at the center of this team. Together, we will work to improve your overall health and encourage you to meet your goals for a healthier lifestyle.     What we expect from YOU:  · Please take frequent home blood pressure measurements. We ask that you take at least 1 blood pressure reading per week, but more information will better help us get you know you. Be sure you rest for a few minutes before taking the reading in a quiet, comfortable place.     Be available to receive phone calls or [facility] messages, when appropriate, from your care team. Please let us know if there are any specific days or times that work best for us to reach you via phone.     Complete routine tests and screenings. Dont worry, we will help keep you on track!           What you should expect  from your Digital Medicine Care Team:   We will work with you to create a personalized plan of care and provide you with encouragement and education, including regarding lifestyle changes, that could help you manage your disease states.     We will adjust your current medications, if needed, and continue to monitor your long-term progress.     We will provide you and your physician with monthly progress reports after you have been in the program for more than 30 days.     We will send you reminders through [facility] and text messages to help ensure you do not miss any testing deadlines to help manage your disease states.    You will be able to reach us by phone or through your [facility] account by clicking our names under Care Team on the right side of the home screen.    I look forward to working with you to achieve your blood pressure goals!    We look forward to working with you to help manage your health,    Sincerely,    Your Digital Medicine Team    Please visit our websites to learn more:   · Hypertension: [email or web address]      Remember, we are not available for emergencies. If you have an emergency, please contact your doctors office directly or call [facility] ([phone] or [phone]) or [phone].

## 2019-02-08 NOTE — PROGRESS NOTES
Last 5 Patient Entered Readings                                      Current 30 Day Average: 183/117     Recent Readings 2/7/2019 2/7/2019 2/7/2019 2/7/2019 2/6/2019    SBP (mmHg) 171 171 184 184 194    DBP (mmHg) 109 109 124 124 127    Pulse 65 65 57 57 56          Patient newly referred to Hypertension Digital Medicine Program. Chart reviewed due to elevated BP readings on enrollment. Jitendra Hanson Jr initiated chlorthalidone on 2/7/19. Task placed for health  to complete enrollment call. Continue to monitor.    Pertinent PMH includes depression, htn, chest pain. Patient recently seen by cardiology and to go work up, including renal artery ultrasound. She has ASHLEY with amlodipine in past, cough with ACE-I, and facial swelling with hctz.    Hypertension Medications             chlorthalidone (HYGROTEN) 25 MG Tab Take 1 tablet (25 mg total) by mouth once daily.    metoprolol succinate (TOPROL-XL) 100 MG 24 hr tablet Take 2 tablets (200 mg total) by mouth once daily.    nitroGLYCERIN (NITROSTAT) 0.3 MG SL tablet Place 1 tablet (0.3 mg total) under the tongue every 5 (five) minutes as needed for Chest pain.    valsartan (DIOVAN) 320 MG tablet Take 1 tablet (320 mg total) by mouth once daily.

## 2019-02-08 NOTE — PROGRESS NOTES
Digital Medicine Enrollment Call    Introduced Mrs. Navjot Segovia to Digital Medicine.     Discussed program expectations and requirements.    Introduced digital medicine care team.     Reviewed the importance of self-monitoring for digital medicine participation.     Reviewed that the Digital Medicine team is not available for emergencies and instructed the patient to call 911 or [facility] ([phone] or [phone]) if one arises.    Pt reports that she is very concerned about BP readings and has become discouraged about taking BP. She states that she takes her BP while sitting on the edge of her bed while her back is not supported with her arm resting on her lap. She also takes her BP at work while sitting at her desk. Pt advised of the importance of the proper BP technique. Pt states that she had to end the call due to being at work.              Last 5 Patient Entered Readings                                      Current 30 Day Average: 183/117     Recent Readings 2/7/2019 2/7/2019 2/7/2019 2/7/2019 2/6/2019    SBP (mmHg) 171 171 184 184 194    DBP (mmHg) 109 109 124 124 127    Pulse 65 65 57 57 56

## 2019-02-11 ENCOUNTER — PATIENT OUTREACH (OUTPATIENT)
Dept: OTHER | Facility: OTHER | Age: 44
End: 2019-02-11

## 2019-02-11 ENCOUNTER — PATIENT OUTREACH (OUTPATIENT)
Dept: ADMINISTRATIVE | Facility: HOSPITAL | Age: 44
End: 2019-02-11

## 2019-02-11 ENCOUNTER — PATIENT MESSAGE (OUTPATIENT)
Dept: ADMINISTRATIVE | Facility: HOSPITAL | Age: 44
End: 2019-02-11

## 2019-02-11 NOTE — LETTER
April 24, 2019     Navjot Segovia  [address] [place] [address]       Dear Navjot,    We have made several attempts to encourage your participation in [facility]. Unfortunately, we have been unsuccessful.     This is an official notice that you are no longer enrolled in the digital medicine program, and thus, we will no longer be managing your disease states. Please note this has no impact on your relationship with [facility] or your providers. Going forward, please reach out to your primary care provider with any questions or concerns regarding your health.    Please contact [phone] if you have any additional questions.    Sincerely,  The [facility] Team

## 2019-02-11 NOTE — PROGRESS NOTES
Last 5 Patient Entered Readings                                      Current 30 Day Average: 181/115     Recent Readings 2/11/2019 2/11/2019 2/9/2019 2/9/2019 2/8/2019    SBP (mmHg) 178 178 177 177 183    DBP (mmHg) 111 111 111 111 111    Pulse 92 92 67 67 60          Digital Medicine: Health  Introduction Call     Left voicemail and requested call back in order to complete Digital Medicine health  introduction call.

## 2019-02-11 NOTE — LETTER
April 24, 2019     Navjot Segovia  [address] [place] [address]       Dear Navjot,    We have made several attempts to encourage your participation in [facility]. Unfortunately, we have been unsuccessful.     This is an official notice that you are no longer enrolled in the digital medicine program, and thus, we will no longer be managing your disease states. Please note this has no impact on your relationship with [facility] or your providers. Going forward, please reach out to your primary care provider with any questions or concerns regarding your health.    Please contact [phone] if you have any additional questions.    Sincerely,  The [facility] Team

## 2019-02-11 NOTE — LETTER
March 21, 2019     Navjot Segovia  [address] [place] [address]       Dear Navjot,    Thank you for enrolling in [facility]. To participate, we ask that you submit information at least once weekly through your [facility] account and maintain regular contact with your Care Team. We have not received any data or heard from you in some time.     The Digital Medicine Care Team has attempted to reach you on multiple occasions to determine if you would like to continue participating in the program. While we encourage you to continue participating fully, we understand that circumstances may change.     To continue participating in the program, please contact me at [phone]. If we do not hear back, you will be un-enrolled, and your physician will be notified of your decision.    If you have submitted data and believe you are receiving this letter in error, please call the Digital Medicine Patient Support Line at [phone] for troubleshooting.      We look forward to hearing from you soon.    Sincerely,     Cayla Vick  [facility]

## 2019-02-13 NOTE — PROGRESS NOTES
Last 5 Patient Entered Readings                                      Current 30 Day Average: 180/115     Recent Readings 2/12/2019 2/12/2019 2/11/2019 2/11/2019 2/9/2019    SBP (mmHg) 176 176 178 178 177    DBP (mmHg) 119 119 111 111 111    Pulse 57 57 92 92 67        Called to complete clinician introduction; left voicemail. BP continues to be significantly elevated. Chlorthalidone initiated ~ 2/7/19. Health  attempted contact and left voicemail 2/11.    Pertinent PMH includes depression, htn, chest pain. Patient recently seen by cardiology and to undergo work up, including renal artery ultrasound. She had ASHLEY with amlodipine in past, cough with ACE-I, and facial swelling with hctz.

## 2019-02-18 ENCOUNTER — OFFICE VISIT (OUTPATIENT)
Dept: FAMILY MEDICINE | Facility: CLINIC | Age: 44
End: 2019-02-18
Payer: COMMERCIAL

## 2019-02-18 ENCOUNTER — OFFICE VISIT (OUTPATIENT)
Dept: NEUROLOGY | Facility: CLINIC | Age: 44
End: 2019-02-18
Payer: COMMERCIAL

## 2019-02-18 VITALS
BODY MASS INDEX: 30.93 KG/M2 | HEIGHT: 66 IN | DIASTOLIC BLOOD PRESSURE: 78 MMHG | WEIGHT: 192.44 LBS | HEART RATE: 71 BPM | SYSTOLIC BLOOD PRESSURE: 163 MMHG

## 2019-02-18 VITALS
HEART RATE: 66 BPM | DIASTOLIC BLOOD PRESSURE: 80 MMHG | RESPIRATION RATE: 17 BRPM | OXYGEN SATURATION: 98 % | TEMPERATURE: 99 F | WEIGHT: 194.88 LBS | HEIGHT: 66 IN | BODY MASS INDEX: 31.32 KG/M2 | SYSTOLIC BLOOD PRESSURE: 139 MMHG

## 2019-02-18 DIAGNOSIS — G43.109 MIGRAINE WITH AURA AND WITHOUT STATUS MIGRAINOSUS, NOT INTRACTABLE: Primary | ICD-10-CM

## 2019-02-18 DIAGNOSIS — F41.9 ANXIETY: ICD-10-CM

## 2019-02-18 DIAGNOSIS — I10 HYPERTENSION, ESSENTIAL: ICD-10-CM

## 2019-02-18 DIAGNOSIS — H53.8 BLURRY VISION: ICD-10-CM

## 2019-02-18 DIAGNOSIS — F32.A DEPRESSION, UNSPECIFIED DEPRESSION TYPE: ICD-10-CM

## 2019-02-18 PROCEDURE — 3077F PR MOST RECENT SYSTOLIC BLOOD PRESSURE >= 140 MM HG: ICD-10-PCS | Mod: CPTII,S$GLB,, | Performed by: PSYCHIATRY & NEUROLOGY

## 2019-02-18 PROCEDURE — 3075F PR MOST RECENT SYSTOLIC BLOOD PRESS GE 130-139MM HG: ICD-10-PCS | Mod: CPTII,S$GLB,, | Performed by: INTERNAL MEDICINE

## 2019-02-18 PROCEDURE — 3008F PR BODY MASS INDEX (BMI) DOCUMENTED: ICD-10-PCS | Mod: CPTII,S$GLB,, | Performed by: PSYCHIATRY & NEUROLOGY

## 2019-02-18 PROCEDURE — 3075F SYST BP GE 130 - 139MM HG: CPT | Mod: CPTII,S$GLB,, | Performed by: INTERNAL MEDICINE

## 2019-02-18 PROCEDURE — 3008F BODY MASS INDEX DOCD: CPT | Mod: CPTII,S$GLB,, | Performed by: PSYCHIATRY & NEUROLOGY

## 2019-02-18 PROCEDURE — 3078F DIAST BP <80 MM HG: CPT | Mod: CPTII,S$GLB,, | Performed by: PSYCHIATRY & NEUROLOGY

## 2019-02-18 PROCEDURE — 3008F PR BODY MASS INDEX (BMI) DOCUMENTED: ICD-10-PCS | Mod: CPTII,S$GLB,, | Performed by: INTERNAL MEDICINE

## 2019-02-18 PROCEDURE — 3078F PR MOST RECENT DIASTOLIC BLOOD PRESSURE < 80 MM HG: ICD-10-PCS | Mod: CPTII,S$GLB,, | Performed by: PSYCHIATRY & NEUROLOGY

## 2019-02-18 PROCEDURE — 99999 PR PBB SHADOW E&M-EST. PATIENT-LVL V: ICD-10-PCS | Mod: PBBFAC,,, | Performed by: PSYCHIATRY & NEUROLOGY

## 2019-02-18 PROCEDURE — 99204 PR OFFICE/OUTPT VISIT, NEW, LEVL IV, 45-59 MIN: ICD-10-PCS | Mod: S$GLB,,, | Performed by: PSYCHIATRY & NEUROLOGY

## 2019-02-18 PROCEDURE — 99999 PR PBB SHADOW E&M-EST. PATIENT-LVL III: ICD-10-PCS | Mod: PBBFAC,,, | Performed by: INTERNAL MEDICINE

## 2019-02-18 PROCEDURE — 3079F DIAST BP 80-89 MM HG: CPT | Mod: CPTII,S$GLB,, | Performed by: INTERNAL MEDICINE

## 2019-02-18 PROCEDURE — 99214 PR OFFICE/OUTPT VISIT, EST, LEVL IV, 30-39 MIN: ICD-10-PCS | Mod: S$GLB,,, | Performed by: INTERNAL MEDICINE

## 2019-02-18 PROCEDURE — 3077F SYST BP >= 140 MM HG: CPT | Mod: CPTII,S$GLB,, | Performed by: PSYCHIATRY & NEUROLOGY

## 2019-02-18 PROCEDURE — 99204 OFFICE O/P NEW MOD 45 MIN: CPT | Mod: S$GLB,,, | Performed by: PSYCHIATRY & NEUROLOGY

## 2019-02-18 PROCEDURE — 3008F BODY MASS INDEX DOCD: CPT | Mod: CPTII,S$GLB,, | Performed by: INTERNAL MEDICINE

## 2019-02-18 PROCEDURE — 99214 OFFICE O/P EST MOD 30 MIN: CPT | Mod: S$GLB,,, | Performed by: INTERNAL MEDICINE

## 2019-02-18 PROCEDURE — 99999 PR PBB SHADOW E&M-EST. PATIENT-LVL III: CPT | Mod: PBBFAC,,, | Performed by: INTERNAL MEDICINE

## 2019-02-18 PROCEDURE — 99999 PR PBB SHADOW E&M-EST. PATIENT-LVL V: CPT | Mod: PBBFAC,,, | Performed by: PSYCHIATRY & NEUROLOGY

## 2019-02-18 PROCEDURE — 3079F PR MOST RECENT DIASTOLIC BLOOD PRESSURE 80-89 MM HG: ICD-10-PCS | Mod: CPTII,S$GLB,, | Performed by: INTERNAL MEDICINE

## 2019-02-18 RX ORDER — FLUOXETINE HYDROCHLORIDE 20 MG/1
20 CAPSULE ORAL DAILY
Qty: 30 CAPSULE | Refills: 5 | Status: SHIPPED | OUTPATIENT
Start: 2019-02-18 | End: 2019-12-16 | Stop reason: SDUPTHER

## 2019-02-18 RX ORDER — VALSARTAN 320 MG/1
320 TABLET ORAL DAILY
Qty: 30 TABLET | Refills: 2 | Status: SHIPPED | OUTPATIENT
Start: 2019-02-18 | End: 2019-09-17 | Stop reason: SDUPTHER

## 2019-02-18 NOTE — LETTER
February 18, 2019      Jitendra Hanson Jr., MD  [address] [address]           [facility]- Neurology [address] [facility] [address]  [facility] [address]  Phone: [phone]  Fax: [phone]          Patient: Navjot Segovia   MR Number: [id number]   YOB: 1975   Date of Visit: 2/18/2019       Dear Dr. Jitendra Hanson Jr.:    Thank you for referring Navjot Segovia to me for evaluation. Attached you will find relevant portions of my assessment and plan of care.    If you have questions, please do not hesitate to call me. I look forward to following Navjot Segovia along with you.    Sincerely,    Demetra Miranda,     Enclosure  CC:  No Recipients    If you would like to receive this communication electronically, please contact [email or web address] or [phone] to request more information on [employer] Link access.    For providers and/or their staff who would like to refer a patient to [facility], please contact us through our one-stop-shop provider referral line, [facility] , at [phone].    If you feel you have received this communication in error or would no longer like to receive these types of communications, please e-mail [email or web address]

## 2019-02-18 NOTE — PROGRESS NOTES
Neurology Consult Note    Chief Complaint: headaches    HPI:   Navjot Segovia is a 43 y.o. woman with multiple comorbidities who presents for further evaluation of headaches. She states for the past month, she has been having headaches but that they seem to be improving. She describes the headache as being bilateral gradual onset aching and throbbing pain associated with blurry vision, photophobia, phonophobia, nausea and lightheadedness. She denies vomiting. She denies diplopia, loss of vision, focal numbness, changes in speech, vertigo or focal weakness with the headaches. She states they can get up to a 10/10 pain. She states for the past week, her headaches have greatly improved. She reports there have been days where she has not had a headache and days where she has had a mild headache. She states her metoprolol was recently increased and she recently started fluoxetine and she feels that this has been helping with her headaches. She states 10 years ago, she had a similar headache and saw a neurologist but she is unsure what medication she was tried on at that time. She was taking excedrin for migraine frequently at the beginning of the month, but states for the past week she has not required anything over the counter. She denies a family history of migraine. She admits to a family history of cerebral aneurysm. She has no further complaints.    Past Medical History:  Past Medical History:   Diagnosis Date    Anxiety     History of acne     Hx of psychiatric care     Hyperlipidemia     Hypertension     Keloid cicatrix     Psychiatric problem     Therapy        Past Surgical History:  Past Surgical History:   Procedure Laterality Date    ABLATION-ENDOMETRIAL-THERMAL NOVASURE N/A 2015    [facility]     SECTION, CLASSIC      x 2    [id number] N/A 2015    Performed [facility] at [facility] [place]    Laparoscopic  bilateral tubal ligation.         Social History:  Social History     Socioeconomic History    Marital status:      Spouse name: Not on file    Number of children: 3    Years of education: Not on file    Highest education level: Not on file   Social Needs    Financial resource strain: Not on file    Food insecurity - worry: Not on file    Food insecurity - inability: Not on file    Transportation needs - medical: Not on file    Transportation needs - non-medical: Not on file   Occupational History    Occupation:    Tobacco Use    Smoking status: Never Smoker    Smokeless tobacco: Never Used   Substance and Sexual Activity    Alcohol use: Yes     Comment: Social; rare use    Drug use: No    Sexual activity: Yes     Partners: Male   Other Topics Concern    Are you pregnant or think you may be? Not Asked    Breast-feeding Not Asked    Patient feels they ought to cut down on drinking/drug use Not Asked    Patient annoyed by others criticizing their drinking/drug use Not Asked    Patient has felt bad or guilty about drinking/drug use Not Asked    Patient has had a drink/used drugs as an eye opener in the AM Not Asked   Social History Narrative    Not on file       Family History:  Family History   Problem Relation Age of Onset    Hypertension Father     Heart disease Father 46    Aortic aneurysm Father         abdominal    Hypertension Mother     Depression Sister     Breast cancer Sister     Schizophrenia Maternal Uncle     Schizophrenia Cousin     Ovarian cancer Neg Hx        Medications:  Current Outpatient Medications   Medication Sig Dispense Refill    chlorthalidone (HYGROTEN) 25 MG Tab Take 1 tablet (25 mg total) by mouth once daily. 30 tablet 11    duloxetine (CYMBALTA) 30 MG capsule Take 1 capsule (30 mg total) by mouth once daily. 30 capsule 1    fluconazole (DIFLUCAN) 150 MG Tab Take 1 tablet (150 mg total) by mouth as needed. Take one pill weekly  as needed for yeast infection. 6 tablet 0    fluocinonide (LIDEX) 0.05 % external solution APPLY TO AFFECTED AREA ONCE A DAY  4    FLUoxetine 20 MG capsule Take 1 capsule (20 mg total) by mouth once daily. 30 capsule 1    ketoconazole (NIZORAL) 2 % shampoo APPLY TO THE SCALP 2 TIMES WEEKLY  2    metoprolol succinate (TOPROL-XL) 100 MG 24 hr tablet Take 2 tablets (200 mg total) by mouth once daily. 30 tablet 0    mirtazapine (REMERON) 7.5 MG Tab TAKE 1 TABLET (7.5 MG TOTAL) BY MOUTH EVERY EVENING. 90 tablet 0    multivitamin with minerals tablet Take 1 tablet by mouth once daily.      mupirocin (BACTROBAN) 2 % ointment APPLY TO AFFECTED AREA (CHEST) TWICE A DAY  1    naproxen (NAPROSYN) 500 MG tablet Take 1 tablet (500 mg total) by mouth daily as needed. 60 tablet 0    nitroGLYCERIN (NITROSTAT) 0.3 MG SL tablet Place 1 tablet (0.3 mg total) under the tongue every 5 (five) minutes as needed for Chest pain. 10 tablet 0    tretinoin (RETIN-A) 0.025 % cream APPLY THIN LAYER TO FACE AT BEDTIME  3    valsartan (DIOVAN) 320 MG tablet Take 1 tablet (320 mg total) by mouth once daily. 30 tablet 2     No current facility-administered medications for this visit.        Allergies:  Review of patient's allergies indicates:   Allergen Reactions    Ace inhibitors      Other reaction(s): cough       ROS:  A 12 point review of system was negative aside from pertinent positives and negatives as outlined above.    Physical Exam  Vitals:    02/18/19 0817   BP: (!) 163/78   Pulse: 71       General: well nourished, well developed  Eyes: no scleral icterus   Nose: nasal turbinates intact  Neck: supple, ROM intact  Skin: no rash or ecchymosis  Joints: no swelling or erythema  Cardiac: regular rate and rhythm  Lungs: clear to auscultation bilaterally    Neuro:  Mental status: AAO x 3, no dysarthria, no aphasia, communicating appropriately  CN: PERRL, EOMI, VFF, V1-V3 sensation intact, no facial asymmetry, hearing grossly intact,  tongue midline  Motor:   RUE 5/5  RLE 5/5  LUE 5/5  LLE 5/5    Normal bulk and tone    Reflexes: 2+ throughout, toes equivocal bilaterally  Sensory: intact to light touch throughout  Coordination: no dysmetria on FTN  Gait: steady    Prior Imaging/Labs:  No prior brain imaging available for review      Assessment and Plan:  42 yo woman with headaches likely 2/2 migraine with aura. Given family history of cerebral aneurysm will obtain Mri brain mra head and neck to r/o ICN pathology.    1) Migraine w/aura/Family history of cerebral aneurysm  Improved on increased dose of metoprolol and fluoxetine as per patient  She was advised to take ibuprofen 800mg prn migraine, but was advised not to do this more than 2 times a week to avoid medication overuse headache  If migraines recur and patient requires a preventative, we can consider switching her metoprolol to propranolol if okay with her PCP  MRI  Brain w/o contrast  MRA head w/o contrast  MRA neck w/contrast     2) Blurry vision  Referral to ophthalmology for dilated eye exam      She was advised to notify me for worsening symptoms    I will see her back after MRI's to review findings or sooner if necessary.    Thank you for this consultation.    Demetra Miranda DO  [facility]  [address] [facility] [address]  [place] [id number] [phone]

## 2019-02-18 NOTE — PROGRESS NOTES
"Subjective:       Patient ID: Navjot Segovia is a 43 y.o. female.    Chief Complaint: Follow up headaches      HPI: 42 y/o w/ HTN seen two weeks ago with worsening bitemporal headaches at that time she admitted to worsening mood and sleep disturbance. Started fluoxetine and metoprolol increased to 200mg daily she was enrolled in digital HTN program and thiazide was added last week. Reports significant improvement in headaches. No vision changes no LE swelling. No chest pain. No orthopnea.       Review of Systems   Constitutional: Negative for activity change, appetite change, fatigue, fever and unexpected weight change.   HENT: Negative for ear pain, rhinorrhea and sore throat.    Eyes: Negative for discharge and visual disturbance.   Respiratory: Negative for chest tightness, shortness of breath and wheezing.    Cardiovascular: Negative for chest pain, palpitations and leg swelling.   Gastrointestinal: Negative for abdominal pain, constipation and diarrhea.   Endocrine: Negative for cold intolerance and heat intolerance.   Genitourinary: Negative for dysuria and hematuria.   Musculoskeletal: Negative for joint swelling and neck stiffness.   Skin: Negative for rash.   Neurological: Positive for headaches. Negative for dizziness, syncope and weakness.   Psychiatric/Behavioral: Negative for suicidal ideas.       Objective:     Vitals:    02/18/19 1131   BP: 139/80   BP Location: Right arm   Patient Position: Sitting   Pulse: 66   Resp: 17   Temp: 98.8 °F (37.1 °C)   TempSrc: Oral   SpO2: 98%   Weight: 88.4 kg (194 lb 14.2 oz)   Height: 5' 6" (1.676 m)          Physical Exam   Constitutional: She is oriented to person, place, and time. She appears well-developed and well-nourished.   HENT:   Head: Normocephalic and atraumatic.   Eyes: Conjunctivae are normal. No scleral icterus.   Neck: Normal range of motion.   Cardiovascular: Normal rate and regular rhythm. Exam reveals no gallop and no friction rub.   No " murmur heard.  Pulmonary/Chest: Effort normal and breath sounds normal. She has no wheezes. She has no rales.   Abdominal: Soft. Bowel sounds are normal. There is no tenderness. There is no rebound and no guarding.   Musculoskeletal: Normal range of motion. She exhibits no edema or tenderness.   Neurological: She is alert and oriented to person, place, and time. No cranial nerve deficit.   Skin: Skin is warm and dry.   Psychiatric: She has a normal mood and affect.       Assessment and Plan   1. Anxiety  Improving continue prozac  - FLUoxetine 20 MG capsule; Take 1 capsule (20 mg total) by mouth once daily.  Dispense: 30 capsule; Refill: 5    2. Depression, unspecified depression type   as abvoe  - FLUoxetine 20 MG capsule; Take 1 capsule (20 mg total) by mouth once daily.  Dispense: 30 capsule; Refill: 5    3. Hypertension, essential  BP just at goal continue digitial HTN monitoring labs this week  - valsartan (DIOVAN) 320 MG tablet; Take 1 tablet (320 mg total) by mouth once daily.  Dispense: 30 tablet; Refill: 2

## 2019-02-19 ENCOUNTER — OFFICE VISIT (OUTPATIENT)
Dept: PSYCHIATRY | Facility: CLINIC | Age: 44
End: 2019-02-19
Payer: COMMERCIAL

## 2019-02-19 VITALS
BODY MASS INDEX: 31.3 KG/M2 | HEIGHT: 66 IN | WEIGHT: 194.75 LBS | HEART RATE: 76 BPM | SYSTOLIC BLOOD PRESSURE: 136 MMHG | DIASTOLIC BLOOD PRESSURE: 84 MMHG

## 2019-02-19 DIAGNOSIS — F39 MOOD INSOMNIA: ICD-10-CM

## 2019-02-19 DIAGNOSIS — F33.3 SEVERE RECURRENT MAJOR DEPRESSIVE DISORDER WITH PSYCHOTIC FEATURES WITH ANXIOUS DISTRESS: Primary | ICD-10-CM

## 2019-02-19 DIAGNOSIS — F51.05 MOOD INSOMNIA: ICD-10-CM

## 2019-02-19 PROCEDURE — 3079F DIAST BP 80-89 MM HG: CPT | Mod: CPTII,S$GLB,, | Performed by: NURSE PRACTITIONER

## 2019-02-19 PROCEDURE — 3008F PR BODY MASS INDEX (BMI) DOCUMENTED: ICD-10-PCS | Mod: CPTII,S$GLB,, | Performed by: NURSE PRACTITIONER

## 2019-02-19 PROCEDURE — 3075F PR MOST RECENT SYSTOLIC BLOOD PRESS GE 130-139MM HG: ICD-10-PCS | Mod: CPTII,S$GLB,, | Performed by: NURSE PRACTITIONER

## 2019-02-19 PROCEDURE — 99214 OFFICE O/P EST MOD 30 MIN: CPT | Mod: S$GLB,,, | Performed by: NURSE PRACTITIONER

## 2019-02-19 PROCEDURE — 3008F BODY MASS INDEX DOCD: CPT | Mod: CPTII,S$GLB,, | Performed by: NURSE PRACTITIONER

## 2019-02-19 PROCEDURE — 99214 PR OFFICE/OUTPT VISIT, EST, LEVL IV, 30-39 MIN: ICD-10-PCS | Mod: S$GLB,,, | Performed by: NURSE PRACTITIONER

## 2019-02-19 PROCEDURE — 99999 PR PBB SHADOW E&M-EST. PATIENT-LVL III: ICD-10-PCS | Mod: PBBFAC,,, | Performed by: NURSE PRACTITIONER

## 2019-02-19 PROCEDURE — 3079F PR MOST RECENT DIASTOLIC BLOOD PRESSURE 80-89 MM HG: ICD-10-PCS | Mod: CPTII,S$GLB,, | Performed by: NURSE PRACTITIONER

## 2019-02-19 PROCEDURE — 3075F SYST BP GE 130 - 139MM HG: CPT | Mod: CPTII,S$GLB,, | Performed by: NURSE PRACTITIONER

## 2019-02-19 PROCEDURE — 99999 PR PBB SHADOW E&M-EST. PATIENT-LVL III: CPT | Mod: PBBFAC,,, | Performed by: NURSE PRACTITIONER

## 2019-02-19 RX ORDER — HYDROXYZINE PAMOATE 25 MG/1
CAPSULE ORAL
Qty: 120 CAPSULE | Refills: 0 | Status: SHIPPED | OUTPATIENT
Start: 2019-02-19 | End: 2019-02-27 | Stop reason: SDUPTHER

## 2019-02-19 NOTE — PROGRESS NOTES
Outpatient Psychiatry Follow-Up Visit (MD/NP)    2/19/2019    Clinical Status of Patient:  Outpatient (Ambulatory)    Chief Complaint:  Navjot Segovia is a 43 y.o. female who presents today for follow-up of anxiety.  Met with patient.      Interval History and Content of Current Session:  Interim Events/Subjective Report/Content of Current Session: Navjot  was last seen by Dr. Cohen on 06/29/2017 for an initial psychiatric evaluation. Navjot was diagnosed with Major Depressive Disorder, Recurrent, Mild, and YOLY and a plan of care was devised to include:    1.  Continue Remeron 7.5 mg nightly targeting sleep, depression, anxiety.  Warned of risk of vitor, suicidality, serotonin syndrome, weight gain, oversedation.  2.  Start Cymbalta 30 mg by mouth daily.  Targeting depression, anxiety, chronic pains.  Warned of risk of vitor, suicidality, serotonin syndrome.  3.  If she continues to gain weight or considered weight gain from Remeron, will change to doxepin or amitriptyline.  4.  Asked patient to get set up with EAP therapist.    Today Navjot is seen face to face. She did not return for her follow-up appointment as scheduled over a year ago. She did not continue EAP therapy since that time. She is taking Remeron PRN sleep for the last 2 years which she states is prescribed by Dr. Vazquez. She states she takes 1/2 of a 7.5 mg tablet. She states she tried the Cymbalta but it made her stomach hurt and made her sleepy at work. She did start Prozac 20 mgs three weeks ago and she feels that this is helping some. She feels depressed. Her feels anxious. Her sleep is poor without remeron. Her appetite is okay and her energy level is okay. She states that she has had anxiety for years now but she has needed to see someone for the last month as she feels that the anxiety is out of control and the crying is out of control. She is unable to identify any past or present symptoms of vitor.     Her overall symptom complex  includes: cries easily/frequently, feels anxious, poor sleep without Remeron, isolates, anger, easily irritated, AH: name called, VH: shadows out corner of eye, endorses paranoia, dysphoric mood, poor memory, poor concentration.     Psychotherapy:  · Target symptoms: depression, anxiety   · Why chosen therapy is appropriate versus another modality: relevant to diagnosis, evidence based practice  · Outcome monitoring methods: self-report, observation  · Therapeutic intervention type: insight oriented psychotherapy, supportive psychotherapy  · Topics discussed/themes: importance of medication compliance in symptom management , treatment plan  · The patient's response to the intervention is accepting. The patient's progress toward treatment goals is not applicable because the patient has not been to clinic in over a year.   · Duration of intervention: 25 minutes.    Review of Systems   PSYCHIATRIC: Pertinant items are noted in the narrative.  GENERAL:  Well developed   SKIN:  No rashes or lacerations  HEAD:  Slight headache today  EYES:  No exophthalmos, jaundice or blindness  EARS:  No hearing loss  CHEST:  No shortness of breath  CARDIOVASCULAR:  No chest pain  ABDOMEN:  No nausea, vomiting, pain, constipation or diarrhea  URINARY:  No dysuria or sexual dysfunction  NEUROLOGIC:  No abnormal movements    .  Past Medical, Family and Social History: The patient's past medical, family and social history have been reviewed and updated as appropriate within the electronic medical record - see encounter notes.    Compliance: no    Side effects: N/A    Risk Parameters:  Patient reports no suicidal ideation  Patient reports no homicidal ideation  Patient reports no self-injurious behavior  Patient reports no violent behavior    Exam (detailed: at least 9 elements; comprehensive: all 15 elements)   Constitutional  Vitals:  Most recent vital signs, dated less than 90 days prior to this appointment, were reviewed.   Vitals:     "02/19/19 0956   BP: 136/84   Pulse: 76   Weight: 88.3 kg (194 lb 12.4 oz)   Height: 5' 6" (1.676 m)        General:  unremarkable, age appropriate     Musculoskeletal  Muscle Strength/Tone:  no tremor, no tic   Gait & Station:  non-ataxic     Psychiatric  Speech:  no latency; no press   Mood & Affect:  "I'm okay today."  sad   Thought Process:  normal and logical; appropriately abstract   Associations:  intact   Thought Content:  no suicidality, no homicidality, endorses  Paranoia, AVH   Insight:  has awareness of illness   Judgement: behavior is adequate to circumstances   Orientation:  grossly intact, person, place, situation   Memory: intact for content of interview; immediate memory is 3/3 objects, after 5 minutes is 2/3 objects and with prompting is 3/3 objects   Language: grossly intact; able to repeat no ifs ands or buts   Attention Span & Concentration:  able to focus; able to spell WORLD forward and backward   Fund of Knowledge:  intact and appropriate to age and level of education; adequate (President, MERYL Gardner, current event: Viola Randhawaglen).         Assessment and Diagnosis   Status/Progress: Based on the examination today, the patient's problem(s) is/are worsening.  New problems have been presented today.   Lack of compliance are complicating management of the primary condition.  There are no active rule-out diagnoses for this patient at this time.     General Impression: She has been noncompliant with her plan of care and has returned to this clinic for the first time in over a year.      ICD-10-CM ICD-9-CM   1. 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 Mood insomnia F51.05 [id number]    F39        Intervention/Counseling/Treatment Plan   · Medication Management: The risks and benefits of medication were discussed with the patient.  · The treatment plan and follow up plan were reviewed with the patient.   1. Safety: Call 911 or Crisis " Line or go to ER for suicidal ideation, adverse effects of medication or any other emergency  2. Labs: CMP, CBC, Lipid profile (Previously ordered by another provider),, TSH, Free T3, Free T4,  Urine Drug Screening.  3. Start Hydroxyzine Pamoate 25 mg po take one to two prn anxiety.  4. Continue Prozac 20 mg po qd.  5. Continue Remeron 7.5 mg po qhs prn sleep.  6. Return to clinic in 2 weeks or sooner prn.       Patient agrees with POC.    INSTRUCTIONS  Instructed to call 911 or Crisis Line or go to ER for suicidal ideation, adverse effects of medication or any other emergency. Verbalizes understanding and plan to comply.    Instructed on uses, effects, side effects, adverse reactions and benefits vs risks of hydroxyzine pamoate with emphasis on sleepiness and avoidance of driving and heavy machinery until effects are known and instructed on when to seek 911/ER. Verbalizes understanding and plans to comply.    Return to Clinic: 2 weeks, as needed

## 2019-02-19 NOTE — PATIENT INSTRUCTIONS
[facility] - DEPARTMENT OF PSYCHIATRY   NEW PATIENT ORIENTATION INFORMATION  OUTPATIENT SERVICES COUNSELING CONTRACT    We appreciate the opportunity to participate in your medical care and hope the following protocols will make it easier for you to receive quality treatment in our department.    1. PUNCTUALITY: Your appointment is scheduled for a fixed amount of time reserved especially for you.  To get the benefit of your appointment, please arrive early enough to allow time for parking and registration.  If you are late for your appointment, your clinician is not able to offer additional time.  Please make every effort to be on time.      2. PAYMENT FOR SERVICES:   Payments are expected at the time of service.  Please contact [phone] if you need to resolve issues involving your account at [facility] or to set up a payment plan.    3. CANCELLATION / MISSED APPOINTMENTS:   In order to receive quality care, all appointments must be kept.  Appointment may be cancelled, ONLY by talking with an  at phone number [phone], between 8:00 a.m. and 5:00 p.m., Monday through Friday, at least 24 hours before your appointment time.  Your clinician reserves this time specifically for you, and if you will be unable to use it, it is necessary that you cancel in a timely manner.  If you do not give at least 24-hour notice of cancellation a full fee will be assessed.  Please note that insurance does not cover no-show charges, so you will be billed directly.  If you are consistently late, cancel, or do not show for your appointments, our department reserves the right to terminate treatment    4. CALLING THE DEPARTMENT:   MESSAGES, SCHEDULE OR CANCEL APPOINTMENTS- In general you can reach the department by calling [phone], between 8:00 a.m. and 5:00 p.m., Monday through Friday, to schedule or cancel appointments or leave a message for your clinician.  It is advisable to schedule your visits  far in advance to obtain the most convenient times for your appointments.   AFTER HOURS, WEEKEND OR HOLIDAYS- For urgent questions after hours, weekends and holidays, calling the department number [phone] will connect you to the nursing staff on the Psychiatry Inpatient Unit.  The nursing staff will speak with you and contact the On Call psychiatrist if necessary.   EMERGENCY-  In case of a crisis when there is a concern of harm to self or others, call 911 or the office [phone] between 8:00 a.m. and 5:00 p.m., Monday through Friday.  After hours, weekends or holidays, please call 911 or go to the Emergency Department where you can be thoroughly evaluated by the On Call psychiatrist.  If possible, contact the psychiatrist on call at [phone] prior to leaving for the [facility] Emergency Department.    5. TEAM APPROACH:  Most patients receive therapy through our team system.  In the team system, your primary therapist will be a , psychologist, psychiatry resident or psychiatrist.  If your therapist is a , psychologist or psychiatry resident, please contact your primary therapist first in matters other than medications or acute medical problems.    6. PRESCRIPTION REFILLS:  Prescription refills must be done at your physician office visit.  You will be given a sufficient number of refills to last one extra month beyond your next appointment.  No additional refills will be approved beyond the original treatment plan.  After hours, sufficient medication may be approved to last until the next scheduled appointment. After hours requests for refills on controlled substances may be declined by the psychiatrist on call, as he or she may not be familiar with your case  Please work closely with your doctor so that you have sufficient medication until your next appointment.  Again, please note that no additional prescriptions will be approved per patient request over the phone.   No  "additional refills will be approved beyond the original treatment plan.   In certain exceptional situations, a phone consult appointment may be arranged, with appropriate charge, for the review and approval of prescription refills to last until the next scheduled appointment.    7. FOLLOW UP APPOINTMENTS:  Follow-up appointments can be made in person at the Psychiatry Appointment Desk, [place] [address], or by calling [phone], from 8am to 5pm, between Monday and Friday.  It is advisable to schedule your office visits far enough in advance to obtain the most convenient times for your appointments.    Revised Feb. 23, 2010 - F/OA1/Newpatient    You have been provided with a certain amount of medication with a specified number of refills.  Please follow up within an adequate time before you run out of medications.    REFILLS FOR CONTROLLED SUBSTANCES WILL NOT BE GIVEN WITHOUT AN APPOINTMENT.  I will not honor or fill automated refill requests from pharmacies.  You must come in for an appointment to get refills.    Please book your next appointment for myself or therapist by phone by calling our office at [phone].      PLEASE BE AT LEAST 15 MINUTES EARLY FOR YOUR NEXT APPOINTMENT.  PLEASE, DO NOT BE LATE OR YOU WILL BE TURNED AWAY AND ASKED TO RESCHEDULE.  YOU MUST COME EARLY TO ALLOW TIME FOR CHECK-IN AS WELL AS GET YOUR VITAL SIGNS AND GO OVER YOUR MEDICATIONS.  T[employer]iness is not fair to the patients who present after you and are on time for their appointments.  It causes a delay in the appointments for patients and staff.  IF YOU ARE LATE, THERE IS A POSSIBILITY THAT YOU WILL BE CHARGED FOR THE APPOINTMENT TIME PERSONALLY AND IT WILL NOT GO TO YOUR INSURANCE.  YOU MAY ALSO BE DISCHARGED FROM CLINIC with multiple "No Show" appointments.  -----------------------------------------------------------------------------------------------------------------  IF YOU FEEL SUICIDAL OR HAVING THOUGHTS OR PLANS TO HURT " YOURSELF OR OTHERS, CALL 911 OR REPORT TO THE NEAREST EMERGENCY ROOM.  YOU CAN ALSO ACCESS THE FOLLOWING HOTLINE(S):    National Suicide Hotline Number 2-371-357-TALK (7840)     ([facility] Mobile Crisis, [phone]'   [place] [facility] Crisis Line, [phone]; [facility], 24 hours / 7 days, [phone] ([phone]), [phone] ([phone]))

## 2019-02-20 NOTE — PROGRESS NOTES
Last 5 Patient Entered Readings                                      Current 30 Day Average: 180/115     Recent Readings 2/12/2019 2/12/2019 2/11/2019 2/11/2019 2/9/2019    SBP (mmHg) 176 176 178 178 177    DBP (mmHg) 119 119 111 111 111    Pulse 57 57 92 92 67          Called to complete clinician introduction; left voicemail and sent [facility] message. BP continues to be significantly elevated. Chlorthalidone initiated ~ 2/7/19. Health  attempted contact and left voicemail 2/11 (next outreach planned 2/25).    Pertinent PMH includes depression, htn, chest pain. Patient recently seen by cardiology and to undergo work up, including renal artery ultrasound. She had ASHLEY with amlodipine in past, cough with ACE-I, and facial swelling with hctz.

## 2019-02-21 ENCOUNTER — TELEPHONE (OUTPATIENT)
Dept: OPTOMETRY | Facility: CLINIC | Age: 44
End: 2019-02-21

## 2019-02-22 ENCOUNTER — HOSPITAL ENCOUNTER (OUTPATIENT)
Dept: CARDIOLOGY | Facility: HOSPITAL | Age: 44
Discharge: HOME | End: 2019-02-22
Attending: INTERNAL MEDICINE
Payer: COMMERCIAL

## 2019-02-22 DIAGNOSIS — R07.9 CHEST PAIN, UNSPECIFIED TYPE: ICD-10-CM

## 2019-02-22 DIAGNOSIS — I10 ESSENTIAL HYPERTENSION: ICD-10-CM

## 2019-02-22 LAB
ABDOMINAL AORTA MID EDV: 20 CM/S
ABDOMINAL AORTA MID PSV: 114 CM/S
AORTIC ROOT ANNULUS: 2.15 CM
AORTIC VALVE CUSP SEPERATION: 2.05 CM
ASCENDING AORTA: 2.66 CM
AV PEAK GRADIENT: 7.29 MMHG
AV VELOCITY RATIO: 0.82
CV ECHO LV RWT: 0.66 CM
CV STRESS BASE HR: 65 BPM
DIASTOLIC BLOOD PRESSURE: 104 MMHG
DOP CALC AO PEAK VEL: 1.35 M/S
DOP CALC LVOT AREA: 3.23 CM2
DOP CALC LVOT DIAMETER: 2.03 CM
DOP CALC LVOT PEAK VEL: 1.1 M/S
DOP CALC LVOT STROKE VOLUME: 75.79 CM3
DOP CALCLVOT PEAK VEL VTI: 23.43 CM
E WAVE DECELERATION TIME: 186.99 MSEC
E/A RATIO: 1.78
ECHO LV POSTERIOR WALL: 1.3 CM (ref 0.6–1.1)
FRACTIONAL SHORTENING: 46 % (ref 28–44)
INTERVENTRICULAR SEPTUM: 1.3 CM (ref 0.6–1.1)
IVRT: 0.12 MSEC
LA MAJOR: 5 CM
LA MINOR: 5.36 CM
LA WIDTH: 3.32 CM
LEFT ATRIUM SIZE: 3.94 CM
LEFT ATRIUM VOLUME: 57.53 CM3
LEFT INTERNAL DIMENSION IN SYSTOLE: 2.14 CM (ref 2.1–4)
LEFT RENAL DIST DIAS: 23 CM/S
LEFT RENAL DIST SYS: 84 CM/S
LEFT RENAL MID DIAS: 22 CM/S
LEFT RENAL MID RAR: 0.56
LEFT RENAL MID SYS: 64 CM/S
LEFT RENAL ORIGIN DIAS: 24 CM/S
LEFT RENAL ORIGIN SYS: 75 CM/S
LEFT RENAL PROX DIAS: 18 CM/S
LEFT RENAL PROX SYS: 89 CM/S
LEFT RENAL ULTRASOUND ACCELERATION TIME MEASUREMENT 1: 126 MS
LEFT RENAL ULTRASOUND ACCELERATION TIME MEASUREMENT 2: 144 MS
LEFT RENAL ULTRASOUND ACCELERATION TIME MEASUREMENT 3: 114 MS
LEFT RENAL ULTRASOUND ACCELERATION TIME MEASUREMENT AVERAGE: 144 MS
LEFT RENAL ULTRASOUND KIDNEY SIZE MEASUREMENT 1: 11 CM
LEFT RENAL ULTRASOUND KIDNEY SIZE MEASUREMENT AVERAGE: 11 CM
LEFT RENAL ULTRASOUND RESISTIVE INDEX MEASUREMENT 1: 0.67
LEFT RENAL ULTRASOUND RESISTIVE INDEX MEASUREMENT 2: 0.62
LEFT RENAL ULTRASOUND RESISTIVE INDEX MEASUREMENT 3: 0.55
LEFT RENAL ULTRASOUND RESISTIVE INDEX MEASUREMENT AVERAGE: 0.67
LEFT VENTRICLE DIASTOLIC VOLUME: 67.92 ML
LEFT VENTRICLE SYSTOLIC VOLUME: 15.12 ML
LEFT VENTRICULAR INTERNAL DIMENSION IN DIASTOLE: 3.95 CM (ref 3.5–6)
LEFT VENTRICULAR MASS: 183.13 G
MV PEAK A VEL: 0.64 M/S
MV PEAK E VEL: 1.14 M/S
OHS CV CPX 1 MINUTE RECOVERY HEART RATE: 129 BPM
OHS CV CPX 85 PERCENT MAX PREDICTED HEART RATE MALE: 143
OHS CV CPX ESTIMATED METS: 12
OHS CV CPX MAX PREDICTED HEART RATE: 168
OHS CV CPX PATIENT IS FEMALE: 1
OHS CV CPX PATIENT IS MALE: 0
OHS CV CPX PEAK DIASTOLIC BLOOD PRESSURE: 104 MMHG
OHS CV CPX PEAK HEAR RATE: 151 BPM
OHS CV CPX PEAK RATE PRESSURE PRODUCT: ABNORMAL
OHS CV CPX PEAK SYSTOLIC BLOOD PRESSURE: 218 MMHG
OHS CV CPX PERCENT MAX PREDICTED HEART RATE ACHIEVED: 90
OHS CV CPX PERCENT TARGET HEART RATE ACHIEVED: 100.67
OHS CV CPX RATE PRESSURE PRODUCT PRESENTING: ABNORMAL
OHS CV CPX TARGET HEART RATE: 150
OHS CV LEFT RENAL RAR: 0.78
OHS CV RIGHT RENAL RAR: 0.99
OHS CV US LEFT RENAL HIGHEST EDV: 24
OHS CV US LEFT RENAL HIGHEST PSV: 89
OHS CV US RIGHT RENAL HIGHEST EDV: 38
OHS CV US RIGHT RENAL HIGHEST PSV: 113
PISA TR MAX VEL: 2.51 M/S
PULM VEIN S/D RATIO: 0.87
PV PEAK D VEL: 0.52 M/S
PV PEAK S VEL: 0.45 M/S
PV PEAK VELOCITY: 1.15 CM/S
RA MAJOR: 4.92 CM
RA PRESSURE: 3 MMHG
RA WIDTH: 3.46 CM
RIGHT RENAL DIST DIAS: 29 CM/S
RIGHT RENAL DIST SYS: 93 CM/S
RIGHT RENAL MID DIAS: 38 CM/S
RIGHT RENAL MID RAR: 0.99
RIGHT RENAL MID SYS: 113 CM/S
RIGHT RENAL ORIGIN DIAS: 26 CM/S
RIGHT RENAL ORIGIN SYS: 65 CM/S
RIGHT RENAL PROX DIAS: 32 CM/S
RIGHT RENAL PROX SYS: 95 CM/S
RIGHT RENAL ULTRASOUND ACCELERATION TIME MEASUREMENT 1: 132 MS
RIGHT RENAL ULTRASOUND ACCELERATION TIME MEASUREMENT 2: 144 MS
RIGHT RENAL ULTRASOUND ACCELERATION TIME MEASUREMENT 3: 114 MS
RIGHT RENAL ULTRASOUND ACCELERATION TIME MEASUREMENT AVERAGE: 144 MS
RIGHT RENAL ULTRASOUND KIDNEY SIZE MEASUREMENT 1: 10.9 CM
RIGHT RENAL ULTRASOUND KIDNEY SIZE MEASUREMENT AVERAGE: 10.9 CM
RIGHT RENAL ULTRASOUND RESISTIVE INDEX MEASUREMENT 1: 0.62
RIGHT RENAL ULTRASOUND RESISTIVE INDEX MEASUREMENT 2: 0.58
RIGHT RENAL ULTRASOUND RESISTIVE INDEX MEASUREMENT 3: 0.57
RIGHT RENAL ULTRASOUND RESISTIVE INDEX MEASUREMENT AVERAGE: 0.62
RIGHT VENTRICULAR END-DIASTOLIC DIMENSION: 3.18 CM
RV TISSUE DOPPLER FREE WALL SYSTOLIC VELOCITY 1 (APICAL 4 CHAMBER VIEW): 10.49 M/S
SINUS: 2.83 CM
STJ: 2.41 CM
STRESS ANGINA INDEX: 0
STRESS DUKE TREADMILL SCORE: 5
STRESS ECHO POST EXERCISE DUR MIN: 9 MIN
STRESS ECHO POST EXERCISE DUR SEC: 52
STRESS ST DEPRESSION: 1 MM
SYSTOLIC BLOOD PRESSURE: 170 MMHG
TR MAX PG: 25.2 MMHG
TRICUSPID ANNULAR PLANE SYSTOLIC EXCURSION: 1.94 CM
TV REST PULMONARY ARTERY PRESSURE: 28 MMHG

## 2019-02-22 PROCEDURE — 93325 ECHOCARDIOGRAM STRESS TEST WITH COLOR FLOW DOPPLER (CUPID ONLY): ICD-10-PCS | Mod: 26,,, | Performed by: INTERNAL MEDICINE

## 2019-02-22 PROCEDURE — 93320 DOPPLER ECHO COMPLETE: CPT | Mod: 26,,, | Performed by: INTERNAL MEDICINE

## 2019-02-22 PROCEDURE — 93325 DOPPLER ECHO COLOR FLOW MAPG: CPT | Mod: 26,,, | Performed by: INTERNAL MEDICINE

## 2019-02-22 PROCEDURE — 93320 ECHOCARDIOGRAM STRESS TEST WITH COLOR FLOW DOPPLER (CUPID ONLY): ICD-10-PCS | Mod: 26,,, | Performed by: INTERNAL MEDICINE

## 2019-02-22 PROCEDURE — 93351 STRESS TTE COMPLETE: CPT

## 2019-02-22 PROCEDURE — 93351 ECHOCARDIOGRAM STRESS TEST WITH COLOR FLOW DOPPLER (CUPID ONLY): ICD-10-PCS | Mod: 26,,, | Performed by: INTERNAL MEDICINE

## 2019-02-22 PROCEDURE — 93351 STRESS TTE COMPLETE: CPT | Mod: 26,,, | Performed by: INTERNAL MEDICINE

## 2019-02-22 PROCEDURE — 93975 VASCULAR STUDY: CPT | Mod: 26,,, | Performed by: INTERNAL MEDICINE

## 2019-02-22 PROCEDURE — 93975 VASCULAR STUDY: CPT | Mod: 50

## 2019-02-22 PROCEDURE — 93975 CV US RENAL ARTERY STENOSIS HYPERTENSION COMPLETE (CUPID ONLY): ICD-10-PCS | Mod: 26,,, | Performed by: INTERNAL MEDICINE

## 2019-02-25 NOTE — PROGRESS NOTES
Last 5 Patient Entered Readings                                      Current 30 Day Average: 180/115     Recent Readings 2/12/2019 2/12/2019 2/11/2019 2/11/2019 2/9/2019    SBP (mmHg) 176 176 178 178 177    DBP (mmHg) 119 119 111 111 111    Pulse 57 57 92 92 67          Digital Medicine: Health  Introduction Call     Left voicemail and requested call back in order to complete Digital Medicine health  introduction call.   Sent message via [employer].

## 2019-02-26 ENCOUNTER — TELEPHONE (OUTPATIENT)
Dept: PSYCHIATRY | Facility: HOSPITAL | Age: 44
End: 2019-02-26

## 2019-02-26 ENCOUNTER — PATIENT MESSAGE (OUTPATIENT)
Dept: PSYCHIATRY | Facility: CLINIC | Age: 44
End: 2019-02-26

## 2019-02-26 NOTE — TELEPHONE ENCOUNTER
"Received message from patient that she is crying and not doing well. Call made to patient. She denies suicidal ideation and states, "I don't want to kill myself or nothing like that." She states that her father is sick and he will probably have to move in with her and she is feeling overwhelmed. She is given an appointment to come into the clinic for a visit tomorrow morning at 07:30 a.m. and agrees to do so. She is further advised to call 911 or go to the Emergency Department if she has any suicidal thoughts.  "

## 2019-02-26 NOTE — PROGRESS NOTES
"Outpatient Psychiatry Follow-Up Visit (MD/NP)    2/27/2019    Clinical Status of Patient:  Outpatient (Ambulatory)    Chief Complaint:  Navjot Segovia is a 43 y.o. female who presents today for follow-up of anxiety.  Met with patient.      Interval History and Content of Current Session:  Interim Events/Subjective Report/Content of Current Session: Navjot  was last seen by me on 02/19/2019 for a follow-up evaluation. 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 She had not been seen in clinic for over a year and had been noncompliant with her plan of care. A new plan of care was devised to include:    1. Safety: Call 911 or Crisis Line or go to ER for suicidal ideation, adverse effects of medication or any other emergency  2. Labs: CMP, CBC, Lipid profile (Previously ordered by another provider),, TSH, Free T3, Free T4,  Urine Drug Screening.  3. Start Hydroxyzine Pamoate 25 mg po take one to two prn anxiety.  4. Continue Prozac 20 mg po qd.  5. Continue Remeron 7.5 mg po qhs prn sleep.  6. Return to clinic in 2 weeks or sooner prn.     Today Navjot is seen face to face. On yesterday she messaged that she was crying all the time and was feeling overwhelmed. She was called and given this 07:30 a.m. Appointment today. He states that her father is ill and she believes he is going to have to live with her and this is making her feel overwhelmed. She is seeing images out of the corner of eye. She is feeling depressed. She feels "ridiculously" anxious. She is worrying all the time. Her sleep is good with the Remeron. Her appetite is okay but she does feel like she is eating less. Her energy level is decreasing. She still did not get her labs done as ordered.     Psychotherapy:  · Target symptoms: depression, anxiety   · Why chosen therapy is appropriate versus another modality: relevant to diagnosis, evidence based practice  · Outcome " "monitoring methods: self-report, observation  · Therapeutic intervention type: insight oriented psychotherapy, supportive psychotherapy  · Topics discussed/themes: importance of medication compliance in symptom management, making up narratives in her head about what she thinks people are thinking about her  · The patient's response to the intervention is accepting. The patient's progress toward treatment goals is not applicable because the patient has not been to clinic in over a year.   · Duration of intervention: 19 minutes.    Review of Systems   PSYCHIATRIC: Pertinant items are noted in the narrative.  GENERAL:  Well developed   SKIN:  No rashes or lacerations  HEAD:  Headache today  EYES:  No exophthalmos, jaundice or blindness  EARS:  No hearing loss  CHEST:  No shortness of breath  CARDIOVASCULAR:  No chest pain  ABDOMEN:  No nausea, vomiting, pain, constipation or diarrhea  URINARY:  No dysuria or sexual dysfunction  NEUROLOGIC:  No abnormal movements    .  Past Medical, Family and Social History: The patient's past medical, family and social history have been reviewed and updated as appropriate within the electronic medical record - see encounter notes.    Compliance: no    Side effects: N/A    Risk Parameters:  Patient reports no suicidal ideation  Patient reports no homicidal ideation  Patient reports no self-injurious behavior  Patient reports no violent behavior    Exam (detailed: at least 9 elements; comprehensive: all 15 elements)   Constitutional  Vitals:  Most recent vital signs, dated less than 90 days prior to this appointment, were reviewed.   Vitals:    02/27/19 0728   BP: (!) 160/98   Pulse: (!) 56   Weight: 86 kg (189 lb 7.8 oz)   Height: 5' 6" (1.676 m)        General:  unremarkable, age appropriate     Musculoskeletal  Muscle Strength/Tone:  no tremor, no tic   Gait & Station:  non-ataxic     Psychiatric  Speech:  no latency; no press   Mood & Affect:  "ridiculously anxious."  sad   Thought " Process:  normal and logical   Associations:  intact   Thought Content:  no suicidality, no homicidality, endorses  Paranoia, VH   Insight:  has awareness of illness   Judgement: behavior is adequate to circumstances   Orientation:  grossly intact   Memory: intact for content of interview   Language: grossly intact   Attention Span & Concentration:  able to focus   Fund of Knowledge:  intact and appropriate to age and level of education         Assessment and Diagnosis   Status/Progress: Based on the examination today, the patient's problem(s) is/are failing to respond as expected to treatment.  New problems have been presented today.  She is having problems with concentration, completing tasks and acting normal.  Psychosocial issues are complicating management of the primary condition.  There are no active rule-out diagnoses for this patient at this time.     General Impression: Her symptoms are not getting better.       ICD-10-CM ICD-9-CM   1. 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 Mood insomnia F51.05 [id number]    F39          Intervention/Counseling/Treatment Plan   · Medication Management: The risks and benefits of medication were discussed with the patient.  · The treatment plan and follow up plan were reviewed with the patient.   1. Safety: Call 911 or Crisis Line or go to ER for suicidal ideation, adverse effects of medication or any other emergency  2. Labs: CMP, CBC, Lipid profile (Previously ordered by another provider), TSH, Free T3, Free T4,  Urine Drug Screening.  3. Increase Hydroxyzine Pamoate to 100 mg po BID prn anxiety.  4. Prozac 20 mg po qd.  5. Remeron 7.5 mg po qhs prn sleep.  6. Return to clinic on March 7, 2019 (already has appointment) or sooner prn.   7. Start olanzapine 2.5 mg po qhs.     Patient agrees with POC.    INSTRUCTIONS  Instructed to call 911 or Crisis Line or go to ER for suicidal ideation, adverse effects of  medication or any other emergency. Verbalizes understanding and plan to comply.    Instructed on uses, effects, side effects, adverse reactions and benefits vs risks of Zyprexa with emphasis on risks for NMS, movement problems (EPS), increase in appetite, and risk for metabolic syndrome and instructed on when to seek 911/ER. Verbalizes understanding and plans to comply    Return to Clinic: as needed, March 7, 2019 as already scheduled

## 2019-02-27 ENCOUNTER — OFFICE VISIT (OUTPATIENT)
Dept: PSYCHIATRY | Facility: CLINIC | Age: 44
End: 2019-02-27
Payer: COMMERCIAL

## 2019-02-27 ENCOUNTER — TELEPHONE (OUTPATIENT)
Dept: NEUROLOGY | Facility: CLINIC | Age: 44
End: 2019-02-27

## 2019-02-27 VITALS
BODY MASS INDEX: 30.46 KG/M2 | HEART RATE: 56 BPM | SYSTOLIC BLOOD PRESSURE: 160 MMHG | HEIGHT: 66 IN | DIASTOLIC BLOOD PRESSURE: 98 MMHG | WEIGHT: 189.5 LBS

## 2019-02-27 DIAGNOSIS — F39 MOOD INSOMNIA: ICD-10-CM

## 2019-02-27 DIAGNOSIS — F51.05 MOOD INSOMNIA: ICD-10-CM

## 2019-02-27 DIAGNOSIS — Z82.49 FAMILY HISTORY OF CEREBRAL ANEURYSM: Primary | ICD-10-CM

## 2019-02-27 DIAGNOSIS — F33.3 SEVERE RECURRENT MAJOR DEPRESSIVE DISORDER WITH PSYCHOTIC FEATURES WITH ANXIOUS DISTRESS: Primary | ICD-10-CM

## 2019-02-27 PROCEDURE — 99999 PR PBB SHADOW E&M-EST. PATIENT-LVL III: CPT | Mod: PBBFAC,,, | Performed by: NURSE PRACTITIONER

## 2019-02-27 PROCEDURE — 3080F PR MOST RECENT DIASTOLIC BLOOD PRESSURE >= 90 MM HG: ICD-10-PCS | Mod: CPTII,S$GLB,, | Performed by: NURSE PRACTITIONER

## 2019-02-27 PROCEDURE — 3008F BODY MASS INDEX DOCD: CPT | Mod: CPTII,S$GLB,, | Performed by: NURSE PRACTITIONER

## 2019-02-27 PROCEDURE — 3077F SYST BP >= 140 MM HG: CPT | Mod: CPTII,S$GLB,, | Performed by: NURSE PRACTITIONER

## 2019-02-27 PROCEDURE — 3008F PR BODY MASS INDEX (BMI) DOCUMENTED: ICD-10-PCS | Mod: CPTII,S$GLB,, | Performed by: NURSE PRACTITIONER

## 2019-02-27 PROCEDURE — 3080F DIAST BP >= 90 MM HG: CPT | Mod: CPTII,S$GLB,, | Performed by: NURSE PRACTITIONER

## 2019-02-27 PROCEDURE — 99214 OFFICE O/P EST MOD 30 MIN: CPT | Mod: S$GLB,,, | Performed by: NURSE PRACTITIONER

## 2019-02-27 PROCEDURE — 99214 PR OFFICE/OUTPT VISIT, EST, LEVL IV, 30-39 MIN: ICD-10-PCS | Mod: S$GLB,,, | Performed by: NURSE PRACTITIONER

## 2019-02-27 PROCEDURE — 3077F PR MOST RECENT SYSTOLIC BLOOD PRESSURE >= 140 MM HG: ICD-10-PCS | Mod: CPTII,S$GLB,, | Performed by: NURSE PRACTITIONER

## 2019-02-27 PROCEDURE — 99999 PR PBB SHADOW E&M-EST. PATIENT-LVL III: ICD-10-PCS | Mod: PBBFAC,,, | Performed by: NURSE PRACTITIONER

## 2019-02-27 RX ORDER — HYDROXYZINE PAMOATE 100 MG/1
100 CAPSULE ORAL 2 TIMES DAILY PRN
Qty: 60 CAPSULE | Refills: 0 | Status: SHIPPED | OUTPATIENT
Start: 2019-02-27 | End: 2019-03-19 | Stop reason: SDUPTHER

## 2019-02-27 RX ORDER — OLANZAPINE 5 MG/1
5 TABLET ORAL NIGHTLY
Qty: 30 TABLET | Refills: 0 | Status: SHIPPED | OUTPATIENT
Start: 2019-02-27 | End: 2021-01-27

## 2019-02-27 NOTE — TELEPHONE ENCOUNTER
Patient states MRI brain and MRA head were approved, but MRA neck was not. Will pursue MRI brain and MRA head and will hold off on MRA neck at this time.     Demetra Miranda DO

## 2019-02-27 NOTE — Clinical Note
Good morning,I am seeing this patient this morning. She complains of a constant headache and her blood pressure is a little elevated this morning at 160/98 with a pulse of 56. She states she is compliant with her antihypertensive medication but complains that her blood pressure is not going down into normal limits and she is concerned due to her family history of strokes. She would like assistance with getting better control of her blood pressure especially in light of potentially having to take care of her father who has had multiple strokes. I told her I would reach out to you to let you know of her concerns.

## 2019-02-27 NOTE — PATIENT INSTRUCTIONS
"You have been provided with a certain amount of medication with a specified number of refills.  Please follow up within an adequate time before you run out of medications.    REFILLS FOR CONTROLLED SUBSTANCES WILL NOT BE GIVEN WITHOUT AN APPOINTMENT.  I will not honor or fill automated refill requests from pharmacies.  You must come in for an appointment to get refills.    Please book your next appointment for myself or therapist by phone by calling our office at [phone].      PLEASE BE AT LEAST 15 MINUTES EARLY FOR YOUR NEXT APPOINTMENT.  PLEASE, DO NOT BE LATE OR YOU WILL BE TURNED AWAY AND ASKED TO RESCHEDULE.  YOU MUST COME EARLY TO ALLOW TIME FOR CHECK-IN AS WELL AS GET YOUR VITAL SIGNS AND GO OVER YOUR MEDICATIONS.  [employer] is not fair to the patients who present after you and are on time for their appointments.  It causes a delay in the appointments for patients and staff.  IF YOU ARE LATE, THERE IS A POSSIBILITY THAT YOU WILL BE CHARGED FOR THE APPOINTMENT TIME PERSONALLY AND IT WILL NOT GO TO YOUR INSURANCE.  YOU MAY ALSO BE DISCHARGED FROM CLINIC with multiple "No Show" appointments.  -----------------------------------------------------------------------------------------------------------------  IF YOU FEEL SUICIDAL OR HAVING THOUGHTS OR PLANS TO HURT YOURSELF OR OTHERS, CALL 911 OR REPORT TO THE NEAREST EMERGENCY ROOM.  YOU CAN ALSO ACCESS THE FOLLOWING HOTLINE(S):    National Suicide Hotline Number 0-127-765-TALK (0986)     ([facility] Mobile Crisis, [phone]'   [facility], [phone]; [place], 24 hours / 7 days, [phone] ([phone]), [phone] ([phone]))     "

## 2019-02-28 ENCOUNTER — TELEPHONE (OUTPATIENT)
Dept: FAMILY MEDICINE | Facility: CLINIC | Age: 44
End: 2019-02-28

## 2019-02-28 NOTE — TELEPHONE ENCOUNTER
Left message for pt to return my call. Need to schedule her f/u appt with Either Dr Vazquez or David Michael - per Dr Vazquez's orders

## 2019-03-04 ENCOUNTER — HOSPITAL ENCOUNTER (OUTPATIENT)
Dept: RADIOLOGY | Facility: HOSPITAL | Age: 44
Discharge: HOME | End: 2019-03-04
Attending: PSYCHIATRY & NEUROLOGY
Payer: COMMERCIAL

## 2019-03-04 ENCOUNTER — OFFICE VISIT (OUTPATIENT)
Dept: FAMILY MEDICINE | Facility: CLINIC | Age: 44
End: 2019-03-04
Payer: COMMERCIAL

## 2019-03-04 VITALS
RESPIRATION RATE: 17 BRPM | HEIGHT: 66 IN | DIASTOLIC BLOOD PRESSURE: 90 MMHG | SYSTOLIC BLOOD PRESSURE: 142 MMHG | TEMPERATURE: 99 F | WEIGHT: 188.06 LBS | OXYGEN SATURATION: 98 % | BODY MASS INDEX: 30.22 KG/M2 | HEART RATE: 75 BPM

## 2019-03-04 DIAGNOSIS — G43.109 MIGRAINE WITH AURA AND WITHOUT STATUS MIGRAINOSUS, NOT INTRACTABLE: ICD-10-CM

## 2019-03-04 DIAGNOSIS — R51.9 INTRACTABLE EPISODIC HEADACHE, UNSPECIFIED HEADACHE TYPE: ICD-10-CM

## 2019-03-04 DIAGNOSIS — F33.3 SEVERE RECURRENT MAJOR DEPRESSIVE DISORDER WITH PSYCHOTIC FEATURES WITH ANXIOUS DISTRESS: ICD-10-CM

## 2019-03-04 DIAGNOSIS — I10 ESSENTIAL HYPERTENSION: Primary | ICD-10-CM

## 2019-03-04 PROCEDURE — 70544 MR ANGIOGRAPHY HEAD W/O DYE: CPT | Mod: TC

## 2019-03-04 PROCEDURE — 99214 PR OFFICE/OUTPT VISIT, EST, LEVL IV, 30-39 MIN: ICD-10-PCS | Mod: S$GLB,,, | Performed by: INTERNAL MEDICINE

## 2019-03-04 PROCEDURE — 99999 PR PBB SHADOW E&M-EST. PATIENT-LVL III: ICD-10-PCS | Mod: PBBFAC,,, | Performed by: INTERNAL MEDICINE

## 2019-03-04 PROCEDURE — 3008F PR BODY MASS INDEX (BMI) DOCUMENTED: ICD-10-PCS | Mod: CPTII,S$GLB,, | Performed by: INTERNAL MEDICINE

## 2019-03-04 PROCEDURE — 99214 OFFICE O/P EST MOD 30 MIN: CPT | Mod: S$GLB,,, | Performed by: INTERNAL MEDICINE

## 2019-03-04 PROCEDURE — 99999 PR PBB SHADOW E&M-EST. PATIENT-LVL III: CPT | Mod: PBBFAC,,, | Performed by: INTERNAL MEDICINE

## 2019-03-04 PROCEDURE — 70551 MRI BRAIN STEM W/O DYE: CPT | Mod: TC

## 2019-03-04 PROCEDURE — 70551 MRI BRAIN WITHOUT CONTRAST: ICD-10-PCS | Mod: 26,,, | Performed by: RADIOLOGY

## 2019-03-04 PROCEDURE — 3008F BODY MASS INDEX DOCD: CPT | Mod: CPTII,S$GLB,, | Performed by: INTERNAL MEDICINE

## 2019-03-04 PROCEDURE — 3080F DIAST BP >= 90 MM HG: CPT | Mod: CPTII,S$GLB,, | Performed by: INTERNAL MEDICINE

## 2019-03-04 PROCEDURE — 70551 MRI BRAIN STEM W/O DYE: CPT | Mod: 26,,, | Performed by: RADIOLOGY

## 2019-03-04 PROCEDURE — 3080F PR MOST RECENT DIASTOLIC BLOOD PRESSURE >= 90 MM HG: ICD-10-PCS | Mod: CPTII,S$GLB,, | Performed by: INTERNAL MEDICINE

## 2019-03-04 PROCEDURE — 3077F SYST BP >= 140 MM HG: CPT | Mod: CPTII,S$GLB,, | Performed by: INTERNAL MEDICINE

## 2019-03-04 PROCEDURE — 3077F PR MOST RECENT SYSTOLIC BLOOD PRESSURE >= 140 MM HG: ICD-10-PCS | Mod: CPTII,S$GLB,, | Performed by: INTERNAL MEDICINE

## 2019-03-04 RX ORDER — HYDRALAZINE HYDROCHLORIDE 25 MG/1
25 TABLET, FILM COATED ORAL 2 TIMES DAILY
Qty: 60 TABLET | Refills: 3 | Status: SHIPPED | OUTPATIENT
Start: 2019-03-04 | End: 2019-03-11

## 2019-03-04 NOTE — PROGRESS NOTES
HISTORY OF PRESENT ILLNESS:  Navjot Segovia is a 43 y.o. female who presents to the clinic today for Headache and Hypertension    LOV with Dr. Vazquez 19 for hypertension, anxiety/depression - referred for digital HTN program then.  On review, digital reads are elevated up to 168/108, 176/119.    She has been having some fatigue in the last couple months to where it makes it difficult for her at work.  Feels sleepy and can't concentrate at work and this make her anxious.  Medication changes include increase of Toprol XL, addition of fluoxetine, Remeron, Zyprexa and PRN hydroxyzine.    PAST MEDICAL HISTORY:  Past Medical History:   Diagnosis Date    Anxiety     Depression     History of acne     Hx of psychiatric care     Hyperlipidemia     Hypertension     Keloid cicatrix     Psychiatric problem     Psychosis     Sleep difficulties     Therapy        PAST SURGICAL HISTORY:  Past Surgical History:   Procedure Laterality Date    ABLATION-ENDOMETRIAL-THERMAL NOVASURE N/A 2015    [facility]     SECTION, CLASSIC      x 2    [id number] N/A 2015    Performed [facility] at [facility] OR    Laparoscopic bilateral tubal ligation.         SOCIAL HISTORY:  Social History     Socioeconomic History    Marital status:      Spouse name: Not on file    Number of children: 3    Years of education: Not on file    Highest education level: Not on file   Social Needs    Financial resource strain: Not on file    Food insecurity - worry: Not on file    Food insecurity - inability: Not on file    Transportation needs - medical: Not on file    Transportation needs - non-medical: Not on file   Occupational History    Occupation:    Tobacco Use    Smoking status: Never Smoker    Smokeless tobacco: Never Used   Substance and Sexual Activity    Alcohol use: Yes     Comment: Social; rare use     Drug use: Yes     Types: Marijuana     Comment: uses infrequently    Sexual activity: Yes     Partners: Male     Birth control/protection: See Surgical Hx   Other Topics Concern    Are you pregnant or think you may be? Not Asked    Breast-feeding Not Asked    Patient feels they ought to cut down on drinking/drug use No    Patient annoyed by others criticizing their drinking/drug use No    Patient has felt bad or guilty about drinking/drug use No    Patient has had a drink/used drugs as an eye opener in the AM No   Social History Narrative    Engaged to [place].    Has 3 children.    Works at [employer] in [place].       FAMILY HISTORY:  Family History   Problem Relation Age of Onset    Hypertension Father     Heart disease Father 46    Aortic aneurysm Father         abdominal    Hypertension Mother     Depression Sister     Breast cancer Sister     Schizophrenia Maternal Uncle     Schizophrenia Cousin     Ovarian cancer Neg Hx        ALLERGIES AND MEDICATIONS: updated and reviewed.  Review of patient's allergies indicates:   Allergen Reactions    Ace inhibitors      Other reaction(s): cough     Medication List with Changes/Refills   Current Medications    CHLORTHALIDONE (HYGROTEN) 25 MG TAB    Take 1 tablet (25 mg total) by mouth once daily.    FLUOCINONIDE (LIDEX) 0.05 % EXTERNAL SOLUTION    APPLY TO AFFECTED AREA ONCE A DAY    FLUOXETINE 20 MG CAPSULE    Take 1 capsule (20 mg total) by mouth once daily.    HYDROXYZINE PAMOATE (VISTARIL) 100 MG CAPSULE    Take 1 capsule (100 mg total) by mouth 2 (two) times daily as needed (anxiety).    KETOCONAZOLE (NIZORAL) 2 % SHAMPOO    APPLY TO THE SCALP 2 TIMES WEEKLY    METOPROLOL SUCCINATE (TOPROL-XL) 100 MG 24 HR TABLET    Take 2 tablets (200 mg total) by mouth once daily.    MIRTAZAPINE (REMERON) 7.5 MG TAB    TAKE 1 TABLET (7.5 MG TOTAL) BY MOUTH EVERY EVENING.    MULTIVITAMIN WITH MINERALS TABLET    Take 1 tablet by mouth once daily.    MUPIROCIN  (BACTROBAN) 2 % OINTMENT    APPLY TO AFFECTED AREA (CHEST) TWICE A DAY    NAPROXEN (NAPROSYN) 500 MG TABLET    Take 1 tablet (500 mg total) by mouth daily as needed.    NITROGLYCERIN (NITROSTAT) 0.3 MG SL TABLET    Place 1 tablet (0.3 mg total) under the tongue every 5 (five) minutes as needed for Chest pain.    OLANZAPINE (ZYPREXA) 5 MG TABLET    Take 1 tablet (5 mg total) by mouth every evening.    TRETINOIN (RETIN-A) 0.025 % CREAM    APPLY THIN LAYER TO FACE AT BEDTIME    VALSARTAN (DIOVAN) 320 MG TABLET    Take 1 tablet (320 mg total) by mouth once daily.          CARE TEAM:  Patient Care Team:  Henrik Vazquez MD as PCP - General (Internal Medicine)  Jitendra Hanson Jr., MD as Hypertension Digital Medicine Responsible Provider (Family Medicine)  Butch NielsenD as Hypertension Digital Medicine Clinician (Pharmacist)  Cayla Vick as Digital Medicine Health   Ida Huang MA         REVIEW OF SYSTEMS:  Review of Systems   Constitutional: Positive for fatigue. Negative for fever.   HENT: Negative for congestion and postnasal drip.    Respiratory: Negative for cough and shortness of breath.    Cardiovascular: Negative for chest pain and palpitations.   Gastrointestinal: Negative for abdominal pain, nausea and vomiting.   Musculoskeletal: Negative for back pain and neck pain.   Neurological: Positive for headaches. Negative for dizziness, syncope, light-headedness and numbness.   Psychiatric/Behavioral: Negative for dysphoric mood and sleep disturbance. The patient is nervous/anxious.    All other systems reviewed and are negative.        PHYSICAL EXAM:   Vitals:    03/04/19 1344   BP: (!) 142/90   Pulse: 75   Resp: 17   Temp: 98.6 °F (37 °C)             Body mass index is 30.35 kg/m².     General appearance - alert, well appearing, and in no distress  Mental status - normal mood, behavior, speech, dress, motor activity, and thought processes  Chest - clear to auscultation, no wheezes, rales or  rhonchi, symmetric air entry  Heart - normal rate and regular rhythm  Neurological - alert, oriented, normal speech, no focal findings or movement disorder noted, cranial nerves II through XII intact, normal muscle tone, no tremors, strength 5/5  Musculoskeletal - no joint tenderness, deformity or swelling  Extremities - no pedal edema noted, intact peripheral pulses      ASSESSMENT AND PLAN:  1. Essential hypertension  - Continue current regimen with addition of hydralazine bid.  Continue digital monitoring.  Reinforced DASH diet plan.  - hydrALAZINE (APRESOLINE) 25 MG tablet; Take 1 tablet (25 mg total) by mouth 2 (two) times daily.  Dispense: 60 tablet; Refill: 3    2. 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 Intractable episodic headache, unspecified headache type  - due to elevated BP; addition of hydralazine with close follow up to ensure control of BP and headache.           Follow up 3/11/19 with Dr. Vazquez or sooner as needed.

## 2019-03-05 DIAGNOSIS — I10 HYPERTENSION, ESSENTIAL: ICD-10-CM

## 2019-03-06 RX ORDER — METOPROLOL SUCCINATE 100 MG/1
TABLET, EXTENDED RELEASE ORAL
Qty: 30 TABLET | Refills: 2 | Status: SHIPPED | OUTPATIENT
Start: 2019-03-06 | End: 2019-03-29 | Stop reason: DRUGHIGH

## 2019-03-06 NOTE — PROGRESS NOTES
Last 5 Patient Entered Readings                                      Current 30 Day Average: 178/114     Recent Readings 2/26/2019 2/26/2019 2/12/2019 2/12/2019 2/11/2019    SBP (mmHg) 168 168 176 176 178    DBP (mmHg) 108 108 119 119 111    Pulse 57 57 57 57 92          Called to complete clinician introduction; left voicemail. BP continues to be significantly elevated. Chlorthalidone initiated ~ 2/7/19. Health  continuing to attempt contact to complete her introduction call.    Pertinent PMH includes depression, htn, chest pain. Patient recently seen by cardiology and to undergo work up, including renal artery ultrasound. She had ASHLEY with amlodipine in past, cough with ACE-I, and facial swelling with hctz.

## 2019-03-07 NOTE — PROGRESS NOTES
Last 5 Patient Entered Readings                                      Current 30 Day Average: 178/114     Recent Readings 2/26/2019 2/26/2019 2/12/2019 2/12/2019 2/11/2019    SBP (mmHg) 168 168 176 176 178    DBP (mmHg) 108 108 119 119 111    Pulse 57 57 57 57 92          Patient's mailbox is full. Unable to leave a message.   Will try again in 2 weeks.

## 2019-03-11 ENCOUNTER — OFFICE VISIT (OUTPATIENT)
Dept: FAMILY MEDICINE | Facility: CLINIC | Age: 44
End: 2019-03-11
Payer: COMMERCIAL

## 2019-03-11 VITALS
DIASTOLIC BLOOD PRESSURE: 92 MMHG | OXYGEN SATURATION: 97 % | WEIGHT: 194.44 LBS | SYSTOLIC BLOOD PRESSURE: 154 MMHG | RESPIRATION RATE: 20 BRPM | HEART RATE: 72 BPM | HEIGHT: 66 IN | BODY MASS INDEX: 31.25 KG/M2 | TEMPERATURE: 99 F

## 2019-03-11 DIAGNOSIS — R51.9 INTRACTABLE EPISODIC HEADACHE, UNSPECIFIED HEADACHE TYPE: Primary | ICD-10-CM

## 2019-03-11 DIAGNOSIS — I10 ESSENTIAL HYPERTENSION: ICD-10-CM

## 2019-03-11 DIAGNOSIS — F33.3 SEVERE RECURRENT MAJOR DEPRESSIVE DISORDER WITH PSYCHOTIC FEATURES WITH ANXIOUS DISTRESS: ICD-10-CM

## 2019-03-11 PROCEDURE — 99999 PR PBB SHADOW E&M-EST. PATIENT-LVL III: ICD-10-PCS | Mod: PBBFAC,,, | Performed by: INTERNAL MEDICINE

## 2019-03-11 PROCEDURE — 99999 PR PBB SHADOW E&M-EST. PATIENT-LVL III: CPT | Mod: PBBFAC,,, | Performed by: INTERNAL MEDICINE

## 2019-03-11 PROCEDURE — 3008F PR BODY MASS INDEX (BMI) DOCUMENTED: ICD-10-PCS | Mod: CPTII,S$GLB,, | Performed by: INTERNAL MEDICINE

## 2019-03-11 PROCEDURE — 3080F DIAST BP >= 90 MM HG: CPT | Mod: CPTII,S$GLB,, | Performed by: INTERNAL MEDICINE

## 2019-03-11 PROCEDURE — 3077F PR MOST RECENT SYSTOLIC BLOOD PRESSURE >= 140 MM HG: ICD-10-PCS | Mod: CPTII,S$GLB,, | Performed by: INTERNAL MEDICINE

## 2019-03-11 PROCEDURE — 3008F BODY MASS INDEX DOCD: CPT | Mod: CPTII,S$GLB,, | Performed by: INTERNAL MEDICINE

## 2019-03-11 PROCEDURE — 99214 PR OFFICE/OUTPT VISIT, EST, LEVL IV, 30-39 MIN: ICD-10-PCS | Mod: S$GLB,,, | Performed by: INTERNAL MEDICINE

## 2019-03-11 PROCEDURE — 99214 OFFICE O/P EST MOD 30 MIN: CPT | Mod: S$GLB,,, | Performed by: INTERNAL MEDICINE

## 2019-03-11 PROCEDURE — 3080F PR MOST RECENT DIASTOLIC BLOOD PRESSURE >= 90 MM HG: ICD-10-PCS | Mod: CPTII,S$GLB,, | Performed by: INTERNAL MEDICINE

## 2019-03-11 PROCEDURE — 3077F SYST BP >= 140 MM HG: CPT | Mod: CPTII,S$GLB,, | Performed by: INTERNAL MEDICINE

## 2019-03-11 NOTE — PROGRESS NOTES
"Subjective:       Patient ID: Navjot Segovia is a 43 y.o. female.    Chief Complaint: Follow-up (BP check)    F/u blood pressure    HPI: 44 y/o w/ HTN presents fro follow up. Has been much more fatigued since starting hydralazine. Her father has been in ICU and this has contributed to added stress. Sleep is good with remeron. Headaches are less frequent but has been takign exedrin migraine at least three times per day for last four days. No vision changes no LE swelling      Review of Systems   Constitutional: Negative for activity change, appetite change, fatigue, fever and unexpected weight change.   HENT: Negative for ear pain, rhinorrhea and sore throat.    Eyes: Negative for discharge and visual disturbance.   Respiratory: Negative for chest tightness, shortness of breath and wheezing.    Cardiovascular: Negative for chest pain, palpitations and leg swelling.   Gastrointestinal: Negative for abdominal pain, constipation and diarrhea.   Endocrine: Negative for cold intolerance and heat intolerance.   Genitourinary: Negative for dysuria and hematuria.   Musculoskeletal: Negative for joint swelling and neck stiffness.   Skin: Negative for rash.   Neurological: Positive for headaches. Negative for dizziness, syncope and weakness.   Psychiatric/Behavioral: Negative for suicidal ideas.       Objective:     Vitals:    03/11/19 1623 03/11/19 1636   BP: (!) 170/102 (!) 154/92   BP Location: Left arm    Patient Position: Sitting    BP Method: X-Large (Manual)    Pulse: (!) 56 72   Resp: 20    Temp: 98.7 °F (37.1 °C)    TempSrc: Oral    SpO2: 97%    Weight: 88.2 kg (194 lb 7.1 oz)    Height: 5' 6" (1.676 m)           Physical Exam   Constitutional: She is oriented to person, place, and time. She appears well-developed and well-nourished.   HENT:   Head: Normocephalic and atraumatic.   Eyes: Conjunctivae are normal. No scleral icterus.   Neck: Normal range of motion.   Cardiovascular: Normal rate and regular rhythm. " Exam reveals no gallop and no friction rub.   No murmur heard.  Pulmonary/Chest: Effort normal and breath sounds normal. She has no wheezes. She has no rales.   Abdominal: Soft. Bowel sounds are normal. There is no tenderness. There is no rebound and no guarding.   Musculoskeletal: Normal range of motion. She exhibits no edema or tenderness.   Neurological: She is alert and oriented to person, place, and time. No cranial nerve deficit.   Skin: Skin is warm and dry.   Psychiatric: She has a normal mood and affect.       Assessment and Plan   1. Essential hypertension  abvoe goal with several life stressors and NSAID will hold off adding further agents until follow up would consider addition of spironolactone at next visit if BP still > 140/90    2. Intractable episodic headache, unspecified headache type  Discussed rebound effect with frequent NSAID use to limit to three times per week    3. 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

## 2019-03-18 ENCOUNTER — PATIENT OUTREACH (OUTPATIENT)
Dept: FAMILY MEDICINE | Facility: CLINIC | Age: 44
End: 2019-03-18

## 2019-03-18 NOTE — PROGRESS NOTES
Letter mailed out today to pt in regards to overdue HM, pap. Will follow up in a week with  phone/portal.

## 2019-03-19 DIAGNOSIS — F33.3 SEVERE RECURRENT MAJOR DEPRESSIVE DISORDER WITH PSYCHOTIC FEATURES WITH ANXIOUS DISTRESS: ICD-10-CM

## 2019-03-19 RX ORDER — HYDROXYZINE PAMOATE 50 MG/1
CAPSULE ORAL
Qty: 120 CAPSULE | Refills: 0 | Status: SHIPPED | OUTPATIENT
Start: 2019-03-19 | End: 2022-06-01 | Stop reason: ALTCHOICE

## 2019-03-19 NOTE — TELEPHONE ENCOUNTER
Received message from pharmacy that Hydroxyzine Pamoate 100 mg capsules are on back order. Re [facility] for change to prescription to Vistaril 50 mg capsules, two PO, BID.

## 2019-03-21 NOTE — PROGRESS NOTES
Last 5 Patient Entered Readings                                      Current 30 Day Average: 168/96     Recent Readings 3/11/2019 3/11/2019 3/11/2019 3/11/2019 3/11/2019    SBP (mmHg) 167 167 128 128 167    DBP (mmHg) 107 107 70 70 98    Pulse 65 65 97 97 66          Unable to leave message, mailbox full.   Sending non- compliance via mail.

## 2019-03-29 ENCOUNTER — PATIENT OUTREACH (OUTPATIENT)
Dept: ADMINISTRATIVE | Facility: HOSPITAL | Age: 44
End: 2019-03-29

## 2019-03-29 DIAGNOSIS — I10 HYPERTENSION, ESSENTIAL: ICD-10-CM

## 2019-03-29 RX ORDER — METOPROLOL SUCCINATE 100 MG/1
200 TABLET, EXTENDED RELEASE ORAL DAILY
Qty: 60 TABLET | Refills: 2 | Status: SHIPPED | OUTPATIENT
Start: 2019-03-29 | End: 2019-09-17 | Stop reason: SDUPTHER

## 2019-03-29 NOTE — TELEPHONE ENCOUNTER
----- Message from Arnaldo Harris sent at 3/29/2019  8:37 AM CDT -----  Contact: [facility]/[phone]  Refill:metoprolol succinate (TOPROL-XL) 100 MG 24 hr tablet          [facility]/pharmacy #[id number] - [place], [place] - [address] [phone] (Phone)  [phone] (Fax)        Thank you

## 2019-04-02 NOTE — PROGRESS NOTES
Last 5 Patient Entered Readings                                      Current 30 Day Average: 167/92     Recent Readings 3/11/2019 3/11/2019 3/11/2019 3/11/2019 3/11/2019    SBP (mmHg) 167 167 128 128 167    DBP (mmHg) 107 107 70 70 98    Pulse 65 65 97 97 66          Health  following non-compliance protocol

## 2019-04-05 NOTE — PROGRESS NOTES
Last 5 Patient Entered Readings                                      Current 30 Day Average: 167/92     Recent Readings 3/11/2019 3/11/2019 3/11/2019 3/11/2019 3/11/2019    SBP (mmHg) 167 167 128 128 167    DBP (mmHg) 107 107 70 70 98    Pulse 65 65 97 97 66          Patient reports she can not talk right now and requested a call back next week.

## 2019-04-10 ENCOUNTER — TELEPHONE (OUTPATIENT)
Dept: FAMILY MEDICINE | Facility: CLINIC | Age: 44
End: 2019-04-10

## 2019-04-11 NOTE — PROGRESS NOTES
Reached out to pt in regards to overdue HM pt unavailable, left voicemail. Please sign referral.

## 2019-04-17 NOTE — PROGRESS NOTES
Last 5 Patient Entered Readings                                      Current 30 Day Average:      Recent Readings 3/11/2019 3/11/2019 3/11/2019 3/11/2019 3/11/2019    SBP (mmHg) 167 167 128 128 167    DBP (mmHg) 107 107 70 70 98    Pulse 65 65 97 97 66          Health  continues to attempt contact with patient.

## 2019-04-24 NOTE — PROGRESS NOTES
Last 5 Patient Entered Readings                                      Current 30 Day Average:      Recent Readings 3/11/2019 3/11/2019 3/11/2019 3/11/2019 3/11/2019    SBP (mmHg) 167 167 128 128 167    DBP (mmHg) 107 107 70 70 98    Pulse 65 65 97 97 66          Mailbox is full. Unable to leave message.   Sending discharge letter via [employer] and mail.

## 2019-05-10 ENCOUNTER — OFFICE VISIT (OUTPATIENT)
Dept: FAMILY MEDICINE | Facility: CLINIC | Age: 44
End: 2019-05-10
Payer: COMMERCIAL

## 2019-05-10 VITALS
BODY MASS INDEX: 30.4 KG/M2 | WEIGHT: 189.13 LBS | HEART RATE: 73 BPM | TEMPERATURE: 98 F | HEIGHT: 66 IN | SYSTOLIC BLOOD PRESSURE: 180 MMHG | DIASTOLIC BLOOD PRESSURE: 96 MMHG | OXYGEN SATURATION: 96 % | RESPIRATION RATE: 17 BRPM

## 2019-05-10 DIAGNOSIS — I10 HYPERTENSION, ESSENTIAL: ICD-10-CM

## 2019-05-10 DIAGNOSIS — G47.33 OSA (OBSTRUCTIVE SLEEP APNEA): Primary | ICD-10-CM

## 2019-05-10 DIAGNOSIS — J30.89 NON-SEASONAL ALLERGIC RHINITIS, UNSPECIFIED TRIGGER: ICD-10-CM

## 2019-05-10 PROCEDURE — 3080F PR MOST RECENT DIASTOLIC BLOOD PRESSURE >= 90 MM HG: ICD-10-PCS | Mod: CPTII,S$GLB,, | Performed by: INTERNAL MEDICINE

## 2019-05-10 PROCEDURE — 99214 PR OFFICE/OUTPT VISIT, EST, LEVL IV, 30-39 MIN: ICD-10-PCS | Mod: S$GLB,,, | Performed by: INTERNAL MEDICINE

## 2019-05-10 PROCEDURE — 99214 OFFICE O/P EST MOD 30 MIN: CPT | Mod: S$GLB,,, | Performed by: INTERNAL MEDICINE

## 2019-05-10 PROCEDURE — 3077F SYST BP >= 140 MM HG: CPT | Mod: CPTII,S$GLB,, | Performed by: INTERNAL MEDICINE

## 2019-05-10 PROCEDURE — 3008F BODY MASS INDEX DOCD: CPT | Mod: CPTII,S$GLB,, | Performed by: INTERNAL MEDICINE

## 2019-05-10 PROCEDURE — 99999 PR PBB SHADOW E&M-EST. PATIENT-LVL III: CPT | Mod: PBBFAC,,, | Performed by: INTERNAL MEDICINE

## 2019-05-10 PROCEDURE — 3077F PR MOST RECENT SYSTOLIC BLOOD PRESSURE >= 140 MM HG: ICD-10-PCS | Mod: CPTII,S$GLB,, | Performed by: INTERNAL MEDICINE

## 2019-05-10 PROCEDURE — 3008F PR BODY MASS INDEX (BMI) DOCUMENTED: ICD-10-PCS | Mod: CPTII,S$GLB,, | Performed by: INTERNAL MEDICINE

## 2019-05-10 PROCEDURE — 3080F DIAST BP >= 90 MM HG: CPT | Mod: CPTII,S$GLB,, | Performed by: INTERNAL MEDICINE

## 2019-05-10 PROCEDURE — 99999 PR PBB SHADOW E&M-EST. PATIENT-LVL III: ICD-10-PCS | Mod: PBBFAC,,, | Performed by: INTERNAL MEDICINE

## 2019-05-10 RX ORDER — FLUTICASONE PROPIONATE 50 MCG
1 SPRAY, SUSPENSION (ML) NASAL 2 TIMES DAILY
Qty: 16 G | Refills: 1 | Status: SHIPPED | OUTPATIENT
Start: 2019-05-10 | End: 2020-03-16

## 2019-05-10 NOTE — PROGRESS NOTES
"Subjective:       Patient ID: Navjot Segovia is a 44 y.o. female.    Chief Complaint: Sinus Problem (one week in a half); Sore Throat; Cough; Establish Care; Bp follow up; and Headache    F/u blood pressure    HPI: 45 y/o presents for follow up BP taking medciations as prescribed she admits to increase stress with work. She has history of severe ROSALINE but never had titration study for CPAP. She does have unrestful sleep daytime somulence. No LE swelling no palpitations    Review of Systems   Constitutional: Positive for fatigue. Negative for activity change, appetite change, fever and unexpected weight change.   HENT: Negative for ear pain, rhinorrhea and sore throat.    Eyes: Negative for discharge and visual disturbance.   Respiratory: Negative for chest tightness, shortness of breath and wheezing.    Cardiovascular: Negative for chest pain, palpitations and leg swelling.   Gastrointestinal: Negative for abdominal pain, constipation and diarrhea.   Endocrine: Negative for cold intolerance and heat intolerance.   Genitourinary: Negative for dysuria and hematuria.   Musculoskeletal: Negative for joint swelling and neck stiffness.   Skin: Negative for rash.   Neurological: Negative for dizziness, syncope, weakness and headaches.   Psychiatric/Behavioral: Positive for sleep disturbance. Negative for suicidal ideas.       Objective:     Vitals:    05/10/19 1610 05/10/19 1619   BP: (!) 178/91 (!) 180/96   BP Location: Right arm Left arm   Patient Position: Sitting Sitting   Pulse: 73    Resp: 17    Temp: 97.7 °F (36.5 °C)    TempSrc: Oral    SpO2: 96%    Weight: 85.8 kg (189 lb 2.5 oz)    Height: 5' 6" (1.676 m)           Physical Exam   Constitutional: She is oriented to person, place, and time. She appears well-developed and well-nourished.   HENT:   Head: Normocephalic and atraumatic.   Right Ear: Tympanic membrane normal.   Nose: Mucosal edema and rhinorrhea present. Right sinus exhibits no maxillary sinus " tenderness. Left sinus exhibits no maxillary sinus tenderness.   Mouth/Throat: Uvula is midline and mucous membranes are normal. Posterior oropharyngeal erythema present. No oropharyngeal exudate.   Eyes: Conjunctivae are normal. No scleral icterus.   Neck: Normal range of motion.   Cardiovascular: Normal rate and regular rhythm. Exam reveals no gallop and no friction rub.   No murmur heard.  Pulmonary/Chest: Effort normal and breath sounds normal. She has no wheezes. She has no rales.   Abdominal: Soft. Bowel sounds are normal. There is no tenderness. There is no rebound and no guarding.   Musculoskeletal: Normal range of motion. She exhibits no edema or tenderness.   Neurological: She is alert and oriented to person, place, and time. No cranial nerve deficit.   Skin: Skin is warm and dry.   Psychiatric: She has a normal mood and affect.       Assessment and Plan   1. ROSALINE (obstructive sleep apnea)  Referral to sleep study  - Ambulatory referral to Sleep Disorders    2. Hypertension, essential  Above goal due to #1 if no improvement would consider addition of aldactone    3. Non-seasonal allergic rhinitis, unspecified trigger  flonase bid  - fluticasone propionate (FLONASE) 50 mcg/actuation nasal spray; 1 spray (50 mcg total) by Each Nare route 2 (two) times daily.  Dispense: 16 g; Refill: 1

## 2019-05-10 NOTE — PROGRESS NOTES
Last 5 Patient Entered Readings                                      Current 30 Day Average:      Recent Readings 3/11/2019 3/11/2019 3/11/2019 3/11/2019 3/11/2019    SBP (mmHg) 167 167 128 128 167    DBP (mmHg) 107 107 70 70 98    Pulse 65 65 97 97 66          Closing encounter. Health  following non-compliance protocol.

## 2019-05-13 NOTE — PROGRESS NOTES
Last 5 Patient Entered Readings                                      Current 30 Day Average:      Recent Readings 3/11/2019 3/11/2019 3/11/2019 3/11/2019 3/11/2019    SBP (mmHg) 167 167 128 128 167    DBP (mmHg) 107 107 70 70 98    Pulse 65 65 97 97 66          Patient's discharge letter was attempted for delivery. Status reports action needed. Patient has not read [employer] message. Will f/u in 1 week.

## 2019-05-20 ENCOUNTER — EPISODE CHANGES (OUTPATIENT)
Dept: OTHER | Facility: OTHER | Age: 44
End: 2019-05-20

## 2019-05-20 NOTE — PROGRESS NOTES
Last 5 Patient Entered Readings                                      Current 30 Day Average:      Recent Readings 3/11/2019 3/11/2019 3/11/2019 3/11/2019 3/11/2019    SBP (mmHg) 167 167 128 128 167    DBP (mmHg) 107 107 70 70 98    Pulse 65 65 97 97 66          Dropping patient from digital medicine program due to lack of readings and no communication with care team.

## 2019-05-28 ENCOUNTER — PATIENT MESSAGE (OUTPATIENT)
Dept: FAMILY MEDICINE | Facility: CLINIC | Age: 44
End: 2019-05-28

## 2019-05-31 ENCOUNTER — PATIENT MESSAGE (OUTPATIENT)
Dept: FAMILY MEDICINE | Facility: CLINIC | Age: 44
End: 2019-05-31

## 2019-05-31 NOTE — TELEPHONE ENCOUNTER
----- Message from Henrik Vazquez MD sent at 5/31/2019  2:28 PM CDT -----  Please fax FMLA paperwork in my outbox to [employer]

## 2019-06-24 ENCOUNTER — OFFICE VISIT (OUTPATIENT)
Dept: PULMONOLOGY | Facility: CLINIC | Age: 44
End: 2019-06-24
Payer: COMMERCIAL

## 2019-06-24 VITALS
WEIGHT: 195.44 LBS | BODY MASS INDEX: 31.41 KG/M2 | SYSTOLIC BLOOD PRESSURE: 161 MMHG | HEIGHT: 66 IN | TEMPERATURE: 99 F | DIASTOLIC BLOOD PRESSURE: 105 MMHG | HEART RATE: 61 BPM | OXYGEN SATURATION: 96 %

## 2019-06-24 DIAGNOSIS — G47.33 OSA (OBSTRUCTIVE SLEEP APNEA): Primary | ICD-10-CM

## 2019-06-24 PROCEDURE — 99214 OFFICE O/P EST MOD 30 MIN: CPT | Mod: S$GLB,,, | Performed by: NURSE PRACTITIONER

## 2019-06-24 PROCEDURE — 99214 PR OFFICE/OUTPT VISIT, EST, LEVL IV, 30-39 MIN: ICD-10-PCS | Mod: S$GLB,,, | Performed by: NURSE PRACTITIONER

## 2019-06-24 PROCEDURE — 99999 PR PBB SHADOW E&M-EST. PATIENT-LVL IV: CPT | Mod: PBBFAC,,, | Performed by: NURSE PRACTITIONER

## 2019-06-24 PROCEDURE — 3080F PR MOST RECENT DIASTOLIC BLOOD PRESSURE >= 90 MM HG: ICD-10-PCS | Mod: CPTII,S$GLB,, | Performed by: NURSE PRACTITIONER

## 2019-06-24 PROCEDURE — 99999 PR PBB SHADOW E&M-EST. PATIENT-LVL IV: ICD-10-PCS | Mod: PBBFAC,,, | Performed by: NURSE PRACTITIONER

## 2019-06-24 PROCEDURE — 3008F BODY MASS INDEX DOCD: CPT | Mod: CPTII,S$GLB,, | Performed by: NURSE PRACTITIONER

## 2019-06-24 PROCEDURE — 3077F PR MOST RECENT SYSTOLIC BLOOD PRESSURE >= 140 MM HG: ICD-10-PCS | Mod: CPTII,S$GLB,, | Performed by: NURSE PRACTITIONER

## 2019-06-24 PROCEDURE — 3008F PR BODY MASS INDEX (BMI) DOCUMENTED: ICD-10-PCS | Mod: CPTII,S$GLB,, | Performed by: NURSE PRACTITIONER

## 2019-06-24 PROCEDURE — 3077F SYST BP >= 140 MM HG: CPT | Mod: CPTII,S$GLB,, | Performed by: NURSE PRACTITIONER

## 2019-06-24 PROCEDURE — 3080F DIAST BP >= 90 MM HG: CPT | Mod: CPTII,S$GLB,, | Performed by: NURSE PRACTITIONER

## 2019-06-24 NOTE — PROGRESS NOTES
Navjot Segovia  was seen as a new patient at the request of Dr. Vazquez, Henrik MCCABE MD for the evaluation of  possible sleep apnea.    CHIEF COMPLAINT:    Chief Complaint   Patient presents with    Sleep Apnea    Consult       HISTORY OF PRESENT ILLNESS: Navjot Segovia is a 44 y.o. female with depression, HA, HTN, insomnia is here for sleep evaluation. Patient with symptoms of  snoring, witnessed apnea, excessive daytime sleepiness, fatigue and difficulty staying asleep, +sleepy w/driving.     Patient does not have: sleep paralysis, cataplexy, sleep talking, sleep walking,, other unusual sleep behavior, nightmares, RLS and bruxism..  Patient does have: hypnic hallucinations., hear voices on dozing off to sleep    [place] Sleepiness Scale score 15    SLEEP ROUTINE:  Bed partner:  Dexter  Time to bed:  2100  Sleep onset latency:  10 min  On remeron otherwise about 1 hour      Disruptions or awakenings:    5-10 times, without difficulty falling back to sleep  Wakeup time:      0600  Perceived sleep quality:  restless   Daytime naps:     yes -1 nap(s) for about 1 hour at around {on weekends  Weekend sleep routine:      From 2400 to 0900  Caffeine use: 1 cup(s) of coffee qod  exercise habit:   walking 3-4 x a week    PAST MEDICAL HISTORY:    Active Ambulatory Problems     Diagnosis Date Noted    Hypertension     Hyperlipidemia     Chest pain at rest 01/25/2013    Sebaceous cyst of breast 10/30/2013    Heavy menses 01/16/2015    Chest pain 05/22/2018    Severe recurrent major depressive disorder with psychotic features with anxious distress 05/22/2018    Intractable episodic headache 03/04/2019       Past Medical History:   Diagnosis Date    Anxiety     Depression     History of acne     Hx of psychiatric care     Hyperlipidemia     Hypertension     Keloid cicatrix     Psychiatric problem     Psychosis     Sleep difficulties     Therapy                 PAST SURGICAL HISTORY:    Past Surgical  History:   Procedure Laterality Date    ABLATION-ENDOMETRIAL-THERMAL NOVASURE N/A 2015    Performed by Cristela Lara MD at [facility] [place] [place]     SECTION, CLASSIC      x 2    [id number] N/A 2015    Performed by Cristela Lara MD at [facility] [place] [place]    Laparoscopic bilateral tubal ligation.      TONSILLECTOMY           FAMILY HISTORY:                Family History   Problem Relation Age of Onset    Hypertension Father     Heart disease Father 46    Aortic aneurysm Father         abdominal    Stroke Father 46    Hypertension Mother     Depression Sister     Breast cancer Sister     Schizophrenia Maternal Uncle     Schizophrenia Cousin     Ovarian cancer Neg Hx        SOCIAL HISTORY:          Tobacco:   Social History     Tobacco Use   Smoking Status Never Smoker   Smokeless Tobacco Never Used       alcohol use:    Social History     Substance and Sexual Activity   Alcohol Use Yes    Comment: Social; rare use                 Occupation:     ALLERGIES:    Review of patient's allergies indicates:   Allergen Reactions    Ace inhibitors      Other reaction(s): cough       CURRENT MEDICATIONS:    Current Outpatient Medications   Medication Sig Dispense Refill    chlorthalidone (HYGROTEN) 25 MG Tab Take 1 tablet (25 mg total) by mouth once daily. 30 tablet 11    fluocinonide (LIDEX) 0.05 % external solution APPLY TO AFFECTED AREA ONCE A DAY  4    FLUoxetine 20 MG capsule Take 1 capsule (20 mg total) by mouth once daily. 30 capsule 5    fluticasone propionate (FLONASE) 50 mcg/actuation nasal spray 1 spray (50 mcg total) by Each Nare route 2 (two) times daily. 16 g 1    hydrOXYzine (VISTARIL) 50 MG Cap Take 2 capsules (100 mg total) by mouth 2 (two) times daily as needed 120 capsule 0    ketoconazole (NIZORAL) 2 % shampoo APPLY TO THE SCALP 2 TIMES WEEKLY  2    metoprolol succinate (TOPROL-XL) 100 MG 24 hr tablet Take 2 tablets (200 mg total) by mouth  "once daily. 60 tablet 2    mirtazapine (REMERON) 7.5 MG Tab TAKE 1 TABLET (7.5 MG TOTAL) BY MOUTH EVERY EVENING. 90 tablet 0    multivitamin with minerals tablet Take 1 tablet by mouth once daily.      OLANZapine (ZYPREXA) 5 MG tablet Take 1 tablet (5 mg total) by mouth every evening. 30 tablet 0    tretinoin (RETIN-A) 0.025 % cream APPLY THIN LAYER TO FACE AT BEDTIME  3    valsartan (DIOVAN) 320 MG tablet Take 1 tablet (320 mg total) by mouth once daily. 30 tablet 2     No current facility-administered medications for this visit.                   REVIEW OF SYSTEMS:   Review of Systems   Constitutional: Negative for fever, chills, activity change and appetite change.   HENT: Positive for congestion (seasonal, currently none).    Respiratory: Positive for apnea (fiancee would wake pt up), snoring ("everyone" tells her this) and chest tightness (when upset ). Negative for cough, shortness of breath and Paroxysmal Nocturnal Dyspnea.    Cardiovascular: Negative for chest pain, palpitations and leg swelling.   Gastrointestinal: Positive for acid reflux.   Neurological: Positive for headaches. Negative for dizziness and light-headedness.   Psychiatric/Behavioral: Positive for sleep disturbance.        PHYSICAL EXAM:  Vitals:    06/24/19 0909   BP: (Abnormal) 161/105   Pulse: 61   Temp: 98.9 °F (37.2 °C)   SpO2: 96%   Weight: 88.6 kg (195 lb 7 oz)   Height: 5' 6" (1.676 m)   PainSc: 0-No pain     Body mass index is 31.54 kg/m².   Physical Exam   Constitutional: She is oriented to person, place, and time. She appears well-developed. No distress. She is obese.   HENT:   Head: Normocephalic.   Mouth/Throat: Oropharynx is clear and moist. Mallampati Score: IV.   Mild nasal congestion   Neck: Neck supple.   15.5 in   Cardiovascular: Normal rate, regular rhythm and normal heart sounds.   Pulmonary/Chest: Normal expansion, effort normal and breath sounds normal.   Musculoskeletal: She exhibits no edema.   Neurological: She " is alert and oriented to person, place, and time. Gait normal.   Skin: She is not diaphoretic.   Psychiatric: She has a normal mood and affect. Her behavior is normal. Thought content normal.                                               DATA:    Lab Results   Component Value Date    TSH 1.109 02/22/2019     CXR 5/22/2018:unremarkable    Sleep study 7/7/2011: AHI 18.8 oxygen gina 91%    Stress ECHO 2/22/19 : EF 65%, normal LV diastolic function            ASSESSMENT    ICD-10-CM ICD-9-CM    1. ROSALINE (obstructive sleep apnea) G47.33 327.23 Home Sleep Studies       PLAN:    ROSALINE The patient symptomatically has snoring, witnessed apnea, fatigue, EDS  with findings of crowded airway, obesity with comorbid HTN, remote sleep study positive. Pt will need updated  sleep study.     Diagnostic: HST.  The nature of this procedure and its indication was discussed with the patient.     Education: During our discussion today, we talked about the etiology of obstructive sleep apnea as well as the potential ramifications of untreated sleep apnea, which could include daytime sleepiness, hypertension, heart disease and/or stroke.     Precautions: The patient was advised to abstain from driving should they feel sleepy or drowsy.       Thank you for allowing me the opportunity to participate in the care of your patient.    Patient will Follow up follow up after study .     Please cc note to  Henrik Lloyd MD.

## 2019-06-24 NOTE — LETTER
July 2, 2019      Henrik Vazquez MD  [address] [facility] [place] [place] [address]           [facility]  [address] [facility] [address]  [facility] [address]  Phone: [phone]  Fax: [phone]          Patient: Navjot Segovia   MR Number: [id number]   YOB: 1975   Date of Visit: 6/24/2019       Dear Dr. Henrik Vazquez:    Thank you for referring Navjot Segovia to me for evaluation. Attached you will find relevant portions of my assessment and plan of care.    If you have questions, please do not hesitate to call me. I look forward to following Navjot Segovia along with you.    Sincerely,    Valerie Daniel, BETZY    Enclosure  CC:  No Recipients    If you would like to receive this communication electronically, please contact [email or web address] or [phone] to request more information on [employer] access.    For providers and/or their staff who would like to refer a patient to [facility], please contact us through our one-stop-shop provider referral line, [facility] [place], at [phone].    If you feel you have received this communication in error or would no longer like to receive these types of communications, please e-mail [email or web address]

## 2019-06-24 NOTE — PATIENT INSTRUCTIONS
Time for home sleep study:  This referral will be sent to your insurance for authorization.  Depending on your insurance company, this process may take up to 14 business days to be authorized.  Once your study has been authorized, you will receive a call within 2-3 days to schedule this study.  Once your study has been completed, it will take up to 14 business days for the results to be sent back to your provider.    Education: During our discussion today, we talked about the etiology of obstructive sleep apnea as well as the potential ramifications of untreated sleep apnea, which could include daytime sleepiness, dementia, cognitive impairment, hypertension, heart disease and/or stroke. We discussed potential treatment options, which could include weight loss, body positioning, oral appliances (OA), continuous positive airway pressure (CPAP), or referral for surgical consideration.     Behavior modification which includes losing weight, exercising, changing the sleep position, abstaining from alcohol, and avoiding certain medications    Precautions: The patient was advised to abstain from driving should they feel sleepy or drowsy      Obstructive Sleep Apnea  Obstructive sleep apnea is a condition that causes your air passages to become narrowed or blocked during sleep. As a result, breathing stops for short periods. Your body wakes up enough for breathing to begin again, though you don't remember it. The cycle of stopped breathing and brief awakenings can repeat dozens of times a night. This prevents the body from getting to the deeper stages of sleep that are needed for good rest and may cause your body's oxygen level to fall.  Signs of sleep apnea include loud snoring, noisy breathing, and gasping sounds during sleep. Daytime symptoms include waking up tired after a full night's sleep, waking up with headaches, feeling very sleepy or falling asleep during the day, and having problems with memory or  concentration.  Risk factors for sleep apnea include:  · Being overweight  · Being a man, or a woman in menopause  · Smoking  · Using alcohol or sedating medicines  · Having enlarged structures in the nose or throat  Home care  Lifestyle changes that can help treat snoring and sleep apnea include the following:  · If you are overweight, lose weight. Talk to your healthcare provider about a weight-loss plan for you.  · Avoid alcohol for 3 to 4 hours before bedtime. Avoid sedating medications. Ask your healthcare provider about the medicines you take.  · If you smoke, talk to your healthcare provider about ways to quit.  · Sleep on your side. This can help prevent gravity from pulling relaxed throat tissues into your breathing passages.  · If you have allergies or sinus problems that block your nose, ask your healthcare provider for help.  Follow-up care  Follow up with your healthcare provider, or as advised. A diagnosis of sleep apnea is made with a sleep study. Your healthcare provider can tell you more about this test.  When to seek medical advice  Sleep apnea can make you more likely to have certain health problems. These include high blood pressure, heart attack, stroke, and sexual dysfunction. If you have sleep apnea, talk to your healthcare provider about the best treatments for you.  Date Last Reviewed: 4/1/2017  © 8909-6339 [employer]. [address] [place] [address], [place] [address]. All rights reserved. This information is not intended as a substitute for professional medical care. Always follow your healthcare professional's instructions.        What Are Snoring and Obstructive Sleep Apnea?  If youve ever had a stuffed-up nose, you know the feeling of trying to breathe through a very narrow passageway. This is what happens in your throat when you snore. While you sleep, structures in your throat partially block your air passage, making the passage narrow and hard to breathe through. If the entire  passage becomes blocked and you cant breathe at all, you have sleep apnea.      Snoring Obstructive sleep apnea   Snoring  If your throat structures are too large or the muscles relax too much during sleep, the air passage may be partially blocked. As air from the nose or mouth passes around this blockage, the throat structures vibrate, causing the familiar sound of snoring. At times, this sound can be so loud that snorers wake up others, or even themselves, during the night. Snoring gets worse as more and more of the air passage is blocked.  Obstructive sleep apnea  If the structures completely block the throat, air cant flow to the lungs at all. This is called apnea (meaning no breathing). Since the lungs arent getting fresh air, the brain tells the body to wake up just enough to tighten the muscles and unblock the air passage. With a loud gasp, breathing begins again. This process may be repeated over and over again throughout the night, making your sleep fragmented with a lighter stage of sleep. Even though you do not remember waking up many times during the night to a lighter sleep, you feel tired the next day. The lack of sleep and fresh air can also strain your lungs, heart, and other organs, leading to problems such as high blood pressure, heart attack, or stroke.  Problems in the nose and jaw  Problems in the structure of the nose may obstruct breathing. A crooked (deviated) septum or swollen turbinates can make snoring worse or lead to apnea. Also, a receding jaw may make the tongue sit too far back, so its more likely to block the airway when youre asleep.        Date Last Reviewed: 7/18/2015  © 2908-8800 [employer]. [address] [place] [address], [place] [address]. All rights reserved. This information is not intended as a substitute for professional medical care. Always follow your healthcare professional's instructions.

## 2019-06-25 ENCOUNTER — TELEPHONE (OUTPATIENT)
Dept: SLEEP MEDICINE | Facility: HOSPITAL | Age: 44
End: 2019-06-25

## 2019-07-01 ENCOUNTER — HOSPITAL ENCOUNTER (OUTPATIENT)
Dept: SLEEP MEDICINE | Facility: HOSPITAL | Age: 44
Discharge: HOME | End: 2019-07-01
Attending: NURSE PRACTITIONER
Payer: COMMERCIAL

## 2019-07-01 DIAGNOSIS — G47.33 OSA (OBSTRUCTIVE SLEEP APNEA): ICD-10-CM

## 2019-07-01 PROCEDURE — 95800 SLP STDY UNATTENDED: CPT

## 2019-07-01 NOTE — PATIENT INSTRUCTIONS
Your sleep study will be scored and interpreted by one of our physicians who are board certified in sleep medicine.  Within two weeks the results will be sent to the physician who referred you. Your physician should then contact you to go over the results, along with any recommendations. If you do not hear from your physician within two weeks, please call them.  Please return the device back to us tomorrow before 08:00 AM. Drop it off to our .

## 2019-07-01 NOTE — PROGRESS NOTES
The patient ID was verified.  She instructed on how to turn the Home Sleep Testing device on and off, how to apply the sensors.  She was encouraged to sleep on supine position and must have 6 hours of sleep. The patient was instructed not to get the device wet and return it to a  at the hospital. All questions were answered prior to patient leaving.  She was provided the  after visit summary.

## 2019-07-04 PROCEDURE — 95800 PR SLEEP STUDY, UNATTENDED, RECORD HEART RATE/O2 SAT/RESP ANAL/SLEEP TIME: ICD-10-PCS | Mod: 26,,, | Performed by: INTERNAL MEDICINE

## 2019-07-04 PROCEDURE — 95800 SLP STDY UNATTENDED: CPT | Mod: 26,,, | Performed by: INTERNAL MEDICINE

## 2019-07-05 ENCOUNTER — TELEPHONE (OUTPATIENT)
Dept: PULMONOLOGY | Facility: CLINIC | Age: 44
End: 2019-07-05

## 2019-07-05 NOTE — PROCEDURES
"Dear Provider,     You have ordered sleep LAB services to perform the sleep study for Navjot Segovia.  The sleep study that you ordered is complete.      Please find Sleep Study result in "Chart Review" under the "Media tab."      As the ordering provider, you are responsible for reviewing the results and implementing a treatment plan with your patient.    If you need a Sleep Medicine provider to explain the sleep study findings and arrange treatment for the patient, please refer patient for consultation to our Sleep Clinic via [facility] with Ambulatory Consult Sleep.    To do that please place an order for an  "Ambulatory Consult Sleep" - it will go to our clinic work queue for our Medical Assistant to contact the patient for an appointment.     For any questions, please contact our clinic staff at [phone] to talk to clinical staff.   "

## 2019-07-18 ENCOUNTER — OFFICE VISIT (OUTPATIENT)
Dept: OBSTETRICS AND GYNECOLOGY | Facility: CLINIC | Age: 44
End: 2019-07-18
Payer: COMMERCIAL

## 2019-07-18 VITALS
SYSTOLIC BLOOD PRESSURE: 142 MMHG | BODY MASS INDEX: 31.22 KG/M2 | WEIGHT: 194.25 LBS | DIASTOLIC BLOOD PRESSURE: 80 MMHG | HEIGHT: 66 IN

## 2019-07-18 DIAGNOSIS — Z12.4 ENCOUNTER FOR PAPANICOLAOU SMEAR FOR CERVICAL CANCER SCREENING: ICD-10-CM

## 2019-07-18 DIAGNOSIS — N95.1 PERIMENOPAUSAL VASOMOTOR SYMPTOMS: ICD-10-CM

## 2019-07-18 DIAGNOSIS — N76.0 BV (BACTERIAL VAGINOSIS): ICD-10-CM

## 2019-07-18 DIAGNOSIS — B96.89 BV (BACTERIAL VAGINOSIS): ICD-10-CM

## 2019-07-18 DIAGNOSIS — Z12.31 BREAST CANCER SCREENING BY MAMMOGRAM: ICD-10-CM

## 2019-07-18 DIAGNOSIS — Z01.419 ENCOUNTER FOR WELL WOMAN EXAM WITH ROUTINE GYNECOLOGICAL EXAM: Primary | ICD-10-CM

## 2019-07-18 PROCEDURE — 3077F PR MOST RECENT SYSTOLIC BLOOD PRESSURE >= 140 MM HG: ICD-10-PCS | Mod: CPTII,S$GLB,, | Performed by: OBSTETRICS & GYNECOLOGY

## 2019-07-18 PROCEDURE — 99999 PR PBB SHADOW E&M-EST. PATIENT-LVL III: ICD-10-PCS | Mod: PBBFAC,,, | Performed by: OBSTETRICS & GYNECOLOGY

## 2019-07-18 PROCEDURE — 3079F DIAST BP 80-89 MM HG: CPT | Mod: CPTII,S$GLB,, | Performed by: OBSTETRICS & GYNECOLOGY

## 2019-07-18 PROCEDURE — 88175 CYTOPATH C/V AUTO FLUID REDO: CPT

## 2019-07-18 PROCEDURE — 3079F PR MOST RECENT DIASTOLIC BLOOD PRESSURE 80-89 MM HG: ICD-10-PCS | Mod: CPTII,S$GLB,, | Performed by: OBSTETRICS & GYNECOLOGY

## 2019-07-18 PROCEDURE — 3077F SYST BP >= 140 MM HG: CPT | Mod: CPTII,S$GLB,, | Performed by: OBSTETRICS & GYNECOLOGY

## 2019-07-18 PROCEDURE — 87624 HPV HI-RISK TYP POOLED RSLT: CPT

## 2019-07-18 PROCEDURE — 99386 PR PREVENTIVE VISIT,NEW,40-64: ICD-10-PCS | Mod: S$GLB,,, | Performed by: OBSTETRICS & GYNECOLOGY

## 2019-07-18 PROCEDURE — 99999 PR PBB SHADOW E&M-EST. PATIENT-LVL III: CPT | Mod: PBBFAC,,, | Performed by: OBSTETRICS & GYNECOLOGY

## 2019-07-18 PROCEDURE — 99386 PREV VISIT NEW AGE 40-64: CPT | Mod: S$GLB,,, | Performed by: OBSTETRICS & GYNECOLOGY

## 2019-07-18 RX ORDER — METRONIDAZOLE 500 MG/1
500 TABLET ORAL EVERY 12 HOURS
Qty: 14 TABLET | Refills: 0 | Status: SHIPPED | OUTPATIENT
Start: 2019-07-18 | End: 2019-07-25

## 2019-07-18 RX ORDER — PAROXETINE 7.5 MG/1
7.5 CAPSULE ORAL DAILY
Qty: 30 CAPSULE | Refills: 11 | Status: SHIPPED | OUTPATIENT
Start: 2019-07-18 | End: 2021-01-27

## 2019-07-18 NOTE — PROGRESS NOTES
SUBJECTIVE:   Navjot Segovia is a 44 y.o. female   for annual well woman exam. Patient's last menstrual period was 2019 (exact date)..      She noticed a change in menstrual cycles  Before it was regular and every month, now comes a few days earlier or later  Denies BTB  Associated with vasomotor symptoms daily and vaginal dryness during intercourse only    Contraception: btl       Past Medical History:   Diagnosis Date    Anxiety     Depression     History of acne     Hx of psychiatric care     Hyperlipidemia     Hypertension     Keloid cicatrix     Psychiatric problem     Psychosis     Sleep difficulties     Therapy      Past Surgical History:   Procedure Laterality Date    ABLATION-ENDOMETRIAL-THERMAL NOVASURE N/A 2015    [facility]     SECTION, CLASSIC      x 2    [id number] N/A 2015    [facility]    Laparoscopic bilateral tubal ligation.      TONSILLECTOMY       Social History     Socioeconomic History    Marital status:      Spouse name: Not on file    Number of children: 3    Years of education: Not on file    Highest education level: Not on file   Occupational History    Occupation:    Social Needs    Financial resource strain: Not on file    Food insecurity:     Worry: Not on file     Inability: Not on file    Transportation needs:     Medical: Not on file     Non-medical: Not on file   Tobacco Use    Smoking status: Never Smoker    Smokeless tobacco: Never Used   Substance and Sexual Activity    Alcohol use: Yes     Comment: Social; rare use    Drug use: Yes     Types: Marijuana     Comment: uses infrequently    Sexual activity: Yes     Partners: Male     Birth control/protection: See Surgical Hx   Lifestyle    Physical activity:     Days per week: Not on file     Minutes per session: Not on file    Stress: Not on  file   Relationships    Social connections:     Talks on phone: Not on file     Gets together: Not on file     Attends Buddhism service: Not on file     Active member of club or organization: Not on file     Attends meetings of clubs or organizations: Not on file     Relationship status: Not on file   Other Topics Concern    Are you pregnant or think you may be? Not Asked    Breast-feeding Not Asked    Patient feels they ought to cut down on drinking/drug use No    Patient annoyed by others criticizing their drinking/drug use No    Patient has felt bad or guilty about drinking/drug use No    Patient has had a drink/used drugs as an eye opener in the AM No   Social History Narrative    Engaged to [place].    Has 3 children.    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     Family History   Problem Relation Age of Onset    Hypertension Father     Heart disease Father 46    Aortic aneurysm Father         abdominal    Stroke Father 46    Hypertension Mother     Depression Sister     Breast cancer Sister 52    Schizophrenia Maternal Uncle     Schizophrenia Cousin     Cancer Cousin 50        cervical vs. ovarian    Ovarian cancer Neg Hx      OB History    Para Term  AB Living   4 3 3   1 3   SAB TAB Ectopic Multiple Live Births   1              # Outcome Date GA Lbr Jeet/2nd Weight Sex Delivery Anes PTL Lv   4 Term            3 Term            2 Term            1 SAB               Obstetric Comments   Gynhx: reg now irregular   S/p BTL   Denies abnl MMG   Denies abnl pap, last pap  neg         Current Outpatient Medications   Medication Sig Dispense Refill    chlorthalidone (HYGROTEN) 25 MG Tab Take 1 tablet (25 mg total) by mouth once daily. 30 tablet 11    fluocinonide (LIDEX) 0.05 % external solution APPLY TO AFFECTED AREA ONCE A DAY  4    FLUoxetine 20 MG capsule Take 1 capsule (20 mg total) by mouth once daily. 30 capsule 5    hydrOXYzine (VISTARIL) 50 MG Cap Take 2 capsules (100 mg total)  by mouth 2 (two) times daily as needed 120 capsule 0    ketoconazole (NIZORAL) 2 % shampoo APPLY TO THE SCALP 2 TIMES WEEKLY  2    metoprolol succinate (TOPROL-XL) 100 MG 24 hr tablet Take 2 tablets (200 mg total) by mouth once daily. 60 tablet 2    multivitamin with minerals tablet Take 1 tablet by mouth once daily.      tretinoin (RETIN-A) 0.025 % cream APPLY THIN LAYER TO FACE AT BEDTIME  3    valsartan (DIOVAN) 320 MG tablet Take 1 tablet (320 mg total) by mouth once daily. 30 tablet 2    fluticasone propionate (FLONASE) 50 mcg/actuation nasal spray 1 spray (50 mcg total) by Each Nare route 2 (two) times daily. 16 g 1    metroNIDAZOLE (FLAGYL) 500 MG tablet Take 1 tablet (500 mg total) by mouth every 12 (twelve) hours. for 7 days 14 tablet 0    mirtazapine (REMERON) 7.5 MG Tab TAKE 1 TABLET (7.5 MG TOTAL) BY MOUTH EVERY EVENING. 90 tablet 0    OLANZapine (ZYPREXA) 5 MG tablet Take 1 tablet (5 mg total) by mouth every evening. 30 tablet 0    PARoxetine mesylate,menop.sym, (BRISDELLE) 7.5 mg Cap Take 7.5 mg by mouth once daily. 30 capsule 11     No current facility-administered medications for this visit.      Allergies: Ace inhibitors       ROS:  GENERAL: Denies weight gain or weight loss.+ hot flashes  SKIN: Denies rash or lesions.   HEAD: Denies head injury or headache.   NODES: Denies enlarged lymph nodes.   CHEST: Denies chest pain or shortness of breath.   CARDIOVASCULAR: Denies palpitations or left sided chest pain.   ABDOMEN: No abdominal pain, constipation, diarrhea, nausea, vomiting or rectal bleeding.   URINARY: No frequency, dysuria, hematuria, or burning on urination.  REPRODUCTIVE: Denies vaginal discharge, abnormal vaginal bleeding, lesions, pelvic pain  BREASTS: The patient performs breast self-examination and denies pain, lumps, or nipple discharge.   HEMATOLOGIC: No easy bruisability or excessive bleeding.  MUSCULOSKELETAL: Denies joint pain or swelling.   NEUROLOGIC: Denies syncope or  "weakness.   PSYCHIATRIC: Denies depression, anxiety or mood swings.      OBJECTIVE:   BP (!) 142/80 (BP Location: Left arm, Patient Position: Sitting, BP Method: Medium (Manual))   Ht 5' 6" (1.676 m)   Wt 88.1 kg (194 lb 3.6 oz)   LMP 07/07/2019 (Exact Date)   BMI 31.35 kg/m²   The patient appears well, alert, oriented x 3, in no distress.  PSYCH:  Normal, full range of affect  NECK: negative, no thyromegaly, trachea midline  SKIN: normal, good color, good turgor and no acne, striae, hirsutism  BREAST EXAM: breasts appear normal, no suspicious masses, no skin or nipple changes or axillary nodes  ABDOMEN: soft, non-tender; bowel sounds normal; no masses,  no organomegaly and no hernias, masses, or hepatosplenomegaly  GENITALIA: normal external genitalia, no erythema, no discharge  BLADDER: soft  URETHRA: normal appearing urethra with no masses, tenderness or lesions and normal urethra, normal urethral meatus  VAGINA: vaginal discharge frothy and malodorous  CERVIX: no lesions or cervical motion tenderness  UTERUS: appx 10-12 week size and firm (likely with small fibroid) , normal contour, position, consistency, mobility, non-tender,  ADNEXA: no mass, fullness, tenderness   EXT:  Small skin tags on left leg      ASSESSMENT:   1. Health maintenance  -pap done. Discussed ASCCP guideline screening every 3 - 5 years.   -screening MMG ordered  -counseled on exercise and healthy diet, weight loss  -bone health:  Discussed Vitamin D and Calcium supplementation, weight bearing exercises  2.  Discussed safety at home/school/work, healthy and balanced diet, sleep hygiene, as well as violence/weapons exposure.   3. Vasomotor symptoms:  Discussed lifestyle modifications, HRT vs. Non-HRT.  Pt elected Non-HRT.  Rx for Brisdelle  4.  Bv: rx for flagyl  5. Desires removal of skin tag:  rtc for removal    "

## 2019-07-25 ENCOUNTER — PATIENT OUTREACH (OUTPATIENT)
Dept: ADMINISTRATIVE | Facility: OTHER | Age: 44
End: 2019-07-25

## 2019-07-25 LAB
HPV HR 12 DNA CVX QL NAA+PROBE: NEGATIVE
HPV16 AG SPEC QL: NEGATIVE
HPV18 DNA SPEC QL NAA+PROBE: NEGATIVE

## 2019-07-29 ENCOUNTER — OFFICE VISIT (OUTPATIENT)
Dept: PULMONOLOGY | Facility: CLINIC | Age: 44
End: 2019-07-29
Payer: COMMERCIAL

## 2019-07-29 VITALS
DIASTOLIC BLOOD PRESSURE: 82 MMHG | OXYGEN SATURATION: 97 % | HEIGHT: 66 IN | WEIGHT: 196.13 LBS | BODY MASS INDEX: 31.52 KG/M2 | SYSTOLIC BLOOD PRESSURE: 150 MMHG | HEART RATE: 76 BPM | TEMPERATURE: 98 F

## 2019-07-29 DIAGNOSIS — I10 ESSENTIAL HYPERTENSION: ICD-10-CM

## 2019-07-29 DIAGNOSIS — G47.33 OSA (OBSTRUCTIVE SLEEP APNEA): Primary | ICD-10-CM

## 2019-07-29 PROCEDURE — 99213 OFFICE O/P EST LOW 20 MIN: CPT | Mod: S$GLB,,, | Performed by: NURSE PRACTITIONER

## 2019-07-29 PROCEDURE — 3079F PR MOST RECENT DIASTOLIC BLOOD PRESSURE 80-89 MM HG: ICD-10-PCS | Mod: CPTII,S$GLB,, | Performed by: NURSE PRACTITIONER

## 2019-07-29 PROCEDURE — 3077F PR MOST RECENT SYSTOLIC BLOOD PRESSURE >= 140 MM HG: ICD-10-PCS | Mod: CPTII,S$GLB,, | Performed by: NURSE PRACTITIONER

## 2019-07-29 PROCEDURE — 3079F DIAST BP 80-89 MM HG: CPT | Mod: CPTII,S$GLB,, | Performed by: NURSE PRACTITIONER

## 2019-07-29 PROCEDURE — 99999 PR PBB SHADOW E&M-EST. PATIENT-LVL IV: CPT | Mod: PBBFAC,,, | Performed by: NURSE PRACTITIONER

## 2019-07-29 PROCEDURE — 99999 PR PBB SHADOW E&M-EST. PATIENT-LVL IV: ICD-10-PCS | Mod: PBBFAC,,, | Performed by: NURSE PRACTITIONER

## 2019-07-29 PROCEDURE — 99213 PR OFFICE/OUTPT VISIT, EST, LEVL III, 20-29 MIN: ICD-10-PCS | Mod: S$GLB,,, | Performed by: NURSE PRACTITIONER

## 2019-07-29 PROCEDURE — 3077F SYST BP >= 140 MM HG: CPT | Mod: CPTII,S$GLB,, | Performed by: NURSE PRACTITIONER

## 2019-07-29 PROCEDURE — 3008F PR BODY MASS INDEX (BMI) DOCUMENTED: ICD-10-PCS | Mod: CPTII,S$GLB,, | Performed by: NURSE PRACTITIONER

## 2019-07-29 PROCEDURE — 3008F BODY MASS INDEX DOCD: CPT | Mod: CPTII,S$GLB,, | Performed by: NURSE PRACTITIONER

## 2019-07-29 NOTE — PROGRESS NOTES
Navjot Segovia  is seen for follow up of sleep study.    CHIEF COMPLAINT:    Chief Complaint   Patient presents with    Sleep Apnea     Discuss sleep study       HISTORY OF PRESENT ILLNESS: Navjot Segovia is a 44 y.o. female who presents today for follow up of recent sleep study.Patient with symptoms of  snoring, witnessed apnea, excessive daytime sleepiness, fatigue and difficulty staying asleep, +sleepy w/driving. Wakes up 5-10 times a night. Also reports hypnic hallucinations., hear voices on dozing off to sleep    Patient with co morbidities: depression, HA, HTN, insomnia       HST 7/1/2019:  AHI 9, RDI 33 (percent time below 90% SpO2: 0.2%, Min SpO2: 88.6%)    [place] Sleepiness Scale score   EPWORTH SLEEPINESS SCALE 7/2/2019   Sitting and reading 3   Watching TV 3   Sitting, inactive in a public place (e.g. 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 05/22/2018    Intractable episodic headache 03/04/2019    ROSALINE (obstructive sleep apnea)     Perimenopausal vasomotor symptoms 07/18/2019     Resolved Ambulatory Problems     Diagnosis Date Noted    History of acne     Noncompliance 01/22/2013    Abscess of left breast 06/21/2013    URI (upper respiratory infection) 01/06/2014    Flu-like symptoms 01/06/2014    Bronchitis 03/17/2014    Pharyngitis 03/17/2014     Past Medical History:   Diagnosis Date     Anxiety     Depression     History of acne     Hx of psychiatric care     Hyperlipidemia     Hypertension     Keloid cicatrix     Psychiatric problem     Psychosis     Sleep difficulties     Therapy                 PAST SURGICAL HISTORY:    Past Surgical History:   Procedure Laterality Date    ABLATION-ENDOMETRIAL-THERMAL NOVASURE N/A 2015    [facility]     SECTION, CLASSIC      x 2    [id number] N/A 2015    [facility]    Laparoscopic bilateral tubal ligation.      TONSILLECTOMY           FAMILY HISTORY:                Family History   Problem Relation Age of Onset    Hypertension Father     Heart disease Father 46    Aortic aneurysm Father         abdominal    Stroke Father 46    Hypertension Mother     Depression Sister     Breast cancer Sister 52    Schizophrenia Maternal Uncle     Schizophrenia Cousin     Cancer Cousin 50        cervical vs. ovarian    Ovarian cancer Neg Hx        SOCIAL HISTORY:          Tobacco:   Social History     Tobacco Use   Smoking Status Never Smoker   Smokeless Tobacco Never Used       alcohol use:    Social History     Substance and Sexual Activity   Alcohol Use Yes    Comment: Social; rare use                     ALLERGIES:    Review of patient's allergies indicates:   Allergen Reactions    Ace inhibitors      Other reaction(s): cough       CURRENT MEDICATIONS:    Current Outpatient Medications   Medication Sig Dispense Refill    chlorthalidone (HYGROTEN) 25 MG Tab Take 1 tablet (25 mg total) by mouth once daily. 30 tablet 11    fluocinonide (LIDEX) 0.05 % external solution APPLY TO AFFECTED AREA ONCE A DAY  4    FLUoxetine 20 MG capsule Take 1 capsule (20 mg total) by mouth once daily. 30 capsule 5    fluticasone propionate (FLONASE) 50 mcg/actuation nasal spray 1 spray (50 mcg total) by Each Nare route 2 (two) times daily. 16 g 1     "hydrOXYzine (VISTARIL) 50 MG Cap Take 2 capsules (100 mg total) by mouth 2 (two) times daily as needed 120 capsule 0    ketoconazole (NIZORAL) 2 % shampoo APPLY TO THE SCALP 2 TIMES WEEKLY  2    metoprolol succinate (TOPROL-XL) 100 MG 24 hr tablet Take 2 tablets (200 mg total) by mouth once daily. 60 tablet 2    mirtazapine (REMERON) 7.5 MG Tab TAKE 1 TABLET (7.5 MG TOTAL) BY MOUTH EVERY EVENING. 90 tablet 0    multivitamin with minerals tablet Take 1 tablet by mouth once daily.      OLANZapine (ZYPREXA) 5 MG tablet Take 1 tablet (5 mg total) by mouth every evening. 30 tablet 0    PARoxetine mesylate,menop.sym, (BRISDELLE) 7.5 mg Cap Take 7.5 mg by mouth once daily. 30 capsule 11    tretinoin (RETIN-A) 0.025 % cream APPLY THIN LAYER TO FACE AT BEDTIME  3    valsartan (DIOVAN) 320 MG tablet Take 1 tablet (320 mg total) by mouth once daily. 30 tablet 2    valsartan (DIOVAN) 320 MG tablet TAKE 1 TABLET (320 MG TOTAL) BY MOUTH ONCE DAILY. 30 tablet 0     No current facility-administered medications for this visit.                   REVIEW OF SYSTEMS:   Review of Systems   Constitutional: Positive for fatigue.   HENT: Negative for congestion.    Respiratory: Positive for apnea, snoring and somnolence. Negative for shortness of breath.    Cardiovascular: Negative for chest pain and leg swelling.   Gastrointestinal: Negative for acid reflux.   Neurological: Negative for headaches.   Psychiatric/Behavioral: Positive for sleep disturbance.        PHYSICAL EXAM:  Vitals:    07/29/19 1506   BP: (Abnormal) 150/82   Pulse: 76   Temp: 98.4 °F (36.9 °C)   SpO2: 97%   Weight: 89 kg (196 lb 1.6 oz)   Height: 5' 6" (1.676 m)   PainSc: 0-No pain     Body mass index is 31.65 kg/m².   Physical Exam   Constitutional: She is oriented to person, place, and time. She appears well-developed. No distress. She is obese.   HENT:   Head: Normocephalic.   Mouth/Throat: Oropharynx is clear and moist. Mallampati Score: IV.   Neck: Neck " supple.   Cardiovascular: Normal rate, regular rhythm and normal heart sounds.   Pulmonary/Chest: Normal expansion, effort normal and breath sounds normal.   Musculoskeletal: She exhibits no edema.   Neurological: She is alert and oriented to person, place, and time. Gait normal.   Skin: Skin is warm. She is not diaphoretic.   Psychiatric: She has a normal mood and affect. Her behavior is normal. Thought content normal.                                               DATA:    Lab Results   Component Value Date    TSH 1.109 02/22/2019     Lab Results   Component Value Date     TSH 1.109 02/22/2019      CXR 5/22/2018:unremarkable     Sleep study 7/7/2011: AHI 18.8 oxygen gina 91%     Stress ECHO 2/22/19 : EF 65%, normal LV diastolic function         ASSESSMENT    ICD-10-CM ICD-9-CM    1. ROSALINE (obstructive sleep apnea) G47.33 327.23 CPAP FOR HOME USE   2. Essential hypertension I10 401.9        PLAN:  Obstructive Sleep Apnea. The patient symptomatically has snoring, witnessed apnea, fatigue, EDS  with findings of thick neck, crowded airway, obesity.     After discussing all options, patient akua to: APAP    Education: During our discussion today, we talked about the etiology of obstructive sleep apnea as well as the potential ramifications of untreated sleep apnea, which could include daytime sleepiness, dementia, cognitive impairment, hypertension, heart disease and/or stroke. We discussed potential treatment options, which could include weight loss, body positioning, oral appliances (OA), continuous positive airway pressure (CPAP), or referral for surgical consideration.     Behavior modification which includes losing weight, exercising, changing the sleep position, abstaining from alcohol, and avoiding certain medications    Precautions: The patient was advised to abstain from driving should they feel sleepy or drowsy     HTN: pt to f/u with pcp        Patient will Follow up follow up 6 weeks following cpap usage .

## 2019-07-29 NOTE — PATIENT INSTRUCTIONS
[facility] :    Durable Medical Equipment -[facility] [phone]      Plan:   1. Start APAP therapy if positive for ROSALINE, Compliance requirement on machine  70% nights (22/30 days) 4 hours or more  3. Exchange mask within 30 days if mask is uncomfortable  4. Follow up in 6 weeks after usage to assess effectiveness, bring machine    Education: During our discussion today, we talked about the etiology of obstructive sleep apnea as well as the potential ramifications of untreated sleep apnea, which could include daytime sleepiness, dementia, cognitive impairment, hypertension, heart disease and/or stroke. We discussed potential treatment options, which could include weight loss, body positioning, oral appliances (OA), continuous positive airway pressure (CPAP), or referral for surgical consideration.     Behavior modification which includes losing weight, exercising, changing the sleep position, abstaining from alcohol, and avoiding certain medications    Precautions: The patient was advised to abstain from driving should they feel sleepy or drowsy    CPAP DESENSITIZATION if difficulty adhering to machine:    Step 1:  Wear the CPAP  at home while awake for 1-2 hour each day.  Practice breathing through the mask while watching television, reading, or performing another sedentary activity that keeps your mind off your anxiety.     Step 2: Use the CPAP   during scheduled one hour naps at home.     Step 3: If can tolerate CPAP for 2 hours daily while awake, start sleeping with it. Use CPAP   during the initial 4.5 hours of nocturnal sleep.     Step 4: Use CPAP  through the entire night of sleep.

## 2019-07-30 DIAGNOSIS — I10 HYPERTENSION, ESSENTIAL: ICD-10-CM

## 2019-08-01 RX ORDER — VALSARTAN 320 MG/1
320 TABLET ORAL DAILY
Qty: 30 TABLET | Refills: 0 | Status: SHIPPED | OUTPATIENT
Start: 2019-08-01 | End: 2019-12-16 | Stop reason: SDUPTHER

## 2019-08-01 RX ORDER — VALSARTAN 320 MG/1
320 TABLET ORAL DAILY
Qty: 30 TABLET | Refills: 2 | OUTPATIENT
Start: 2019-08-01

## 2019-08-21 ENCOUNTER — HOSPITAL ENCOUNTER (OUTPATIENT)
Dept: RADIOLOGY | Facility: HOSPITAL | Age: 44
Discharge: HOME | End: 2019-08-21
Attending: OBSTETRICS & GYNECOLOGY
Payer: COMMERCIAL

## 2019-08-21 DIAGNOSIS — Z12.31 BREAST CANCER SCREENING BY MAMMOGRAM: ICD-10-CM

## 2019-08-21 PROCEDURE — 77063 BREAST TOMOSYNTHESIS BI: CPT | Mod: 26,,, | Performed by: RADIOLOGY

## 2019-08-21 PROCEDURE — 77067 SCR MAMMO BI INCL CAD: CPT | Mod: TC

## 2019-08-21 PROCEDURE — 77067 MAMMO DIGITAL SCREENING BILAT WITH TOMOSYNTHESIS_CAD: ICD-10-PCS | Mod: 26,,, | Performed by: RADIOLOGY

## 2019-08-21 PROCEDURE — 77067 SCR MAMMO BI INCL CAD: CPT | Mod: 26,,, | Performed by: RADIOLOGY

## 2019-08-21 PROCEDURE — 77063 MAMMO DIGITAL SCREENING BILAT WITH TOMOSYNTHESIS_CAD: ICD-10-PCS | Mod: 26,,, | Performed by: RADIOLOGY

## 2019-09-17 DIAGNOSIS — I10 HYPERTENSION, ESSENTIAL: ICD-10-CM

## 2019-09-17 RX ORDER — VALSARTAN 320 MG/1
320 TABLET ORAL DAILY
Qty: 30 TABLET | Refills: 2 | Status: SHIPPED | OUTPATIENT
Start: 2019-09-17 | End: 2019-12-16 | Stop reason: SDUPTHER

## 2019-09-17 RX ORDER — METOPROLOL SUCCINATE 100 MG/1
200 TABLET, EXTENDED RELEASE ORAL DAILY
Qty: 60 TABLET | Refills: 2 | Status: SHIPPED | OUTPATIENT
Start: 2019-09-17 | End: 2019-12-16 | Stop reason: SDUPTHER

## 2019-10-24 ENCOUNTER — OFFICE VISIT (OUTPATIENT)
Dept: FAMILY MEDICINE | Facility: CLINIC | Age: 44
End: 2019-10-24
Payer: COMMERCIAL

## 2019-10-24 ENCOUNTER — HOSPITAL ENCOUNTER (OUTPATIENT)
Dept: RADIOLOGY | Facility: HOSPITAL | Age: 44
Discharge: HOME | End: 2019-10-24
Attending: NURSE PRACTITIONER
Payer: COMMERCIAL

## 2019-10-24 VITALS
DIASTOLIC BLOOD PRESSURE: 85 MMHG | SYSTOLIC BLOOD PRESSURE: 191 MMHG | BODY MASS INDEX: 31 KG/M2 | TEMPERATURE: 99 F | WEIGHT: 192.88 LBS | HEART RATE: 65 BPM | OXYGEN SATURATION: 97 % | HEIGHT: 66 IN

## 2019-10-24 DIAGNOSIS — I10 ESSENTIAL HYPERTENSION: ICD-10-CM

## 2019-10-24 DIAGNOSIS — H53.9 VISUAL DISTURBANCE: ICD-10-CM

## 2019-10-24 DIAGNOSIS — R42 DIZZINESS: Primary | ICD-10-CM

## 2019-10-24 DIAGNOSIS — R51.9 ACUTE INTRACTABLE HEADACHE, UNSPECIFIED HEADACHE TYPE: ICD-10-CM

## 2019-10-24 DIAGNOSIS — H05.229 ORBITAL SWELLING: ICD-10-CM

## 2019-10-24 DIAGNOSIS — R42 DIZZINESS: ICD-10-CM

## 2019-10-24 PROCEDURE — 3079F PR MOST RECENT DIASTOLIC BLOOD PRESSURE 80-89 MM HG: ICD-10-PCS | Mod: CPTII,S$GLB,, | Performed by: NURSE PRACTITIONER

## 2019-10-24 PROCEDURE — 3008F BODY MASS INDEX DOCD: CPT | Mod: CPTII,S$GLB,, | Performed by: NURSE PRACTITIONER

## 2019-10-24 PROCEDURE — 70450 CT HEAD/BRAIN W/O DYE: CPT | Mod: 26,,, | Performed by: RADIOLOGY

## 2019-10-24 PROCEDURE — 99999 PR PBB SHADOW E&M-EST. PATIENT-LVL V: CPT | Mod: PBBFAC,,, | Performed by: NURSE PRACTITIONER

## 2019-10-24 PROCEDURE — 70486 CT MAXILLOFACIAL WITHOUT CONTRAST: ICD-10-PCS | Mod: 26,,, | Performed by: RADIOLOGY

## 2019-10-24 PROCEDURE — 3079F DIAST BP 80-89 MM HG: CPT | Mod: CPTII,S$GLB,, | Performed by: NURSE PRACTITIONER

## 2019-10-24 PROCEDURE — 70450 CT HEAD/BRAIN W/O DYE: CPT | Mod: TC

## 2019-10-24 PROCEDURE — 99214 OFFICE O/P EST MOD 30 MIN: CPT | Mod: S$GLB,,, | Performed by: NURSE PRACTITIONER

## 2019-10-24 PROCEDURE — 3008F PR BODY MASS INDEX (BMI) DOCUMENTED: ICD-10-PCS | Mod: CPTII,S$GLB,, | Performed by: NURSE PRACTITIONER

## 2019-10-24 PROCEDURE — 70450 CT HEAD WITHOUT CONTRAST: ICD-10-PCS | Mod: 26,,, | Performed by: RADIOLOGY

## 2019-10-24 PROCEDURE — 99999 PR PBB SHADOW E&M-EST. PATIENT-LVL V: ICD-10-PCS | Mod: PBBFAC,,, | Performed by: NURSE PRACTITIONER

## 2019-10-24 PROCEDURE — 3077F SYST BP >= 140 MM HG: CPT | Mod: CPTII,S$GLB,, | Performed by: NURSE PRACTITIONER

## 2019-10-24 PROCEDURE — 70486 CT MAXILLOFACIAL W/O DYE: CPT | Mod: TC

## 2019-10-24 PROCEDURE — 70486 CT MAXILLOFACIAL W/O DYE: CPT | Mod: 26,,, | Performed by: RADIOLOGY

## 2019-10-24 PROCEDURE — 99214 PR OFFICE/OUTPT VISIT, EST, LEVL IV, 30-39 MIN: ICD-10-PCS | Mod: S$GLB,,, | Performed by: NURSE PRACTITIONER

## 2019-10-24 PROCEDURE — 3077F PR MOST RECENT SYSTOLIC BLOOD PRESSURE >= 140 MM HG: ICD-10-PCS | Mod: CPTII,S$GLB,, | Performed by: NURSE PRACTITIONER

## 2019-10-24 NOTE — PROGRESS NOTES
"History of Present Illness   Real Shy Segovia is a 44 y.o. woman with medical history as listed below who presents today with pain behind her eye and orbital tenderness x1 day. She reports that she was at work yesterday and began to "feel funny." She felt dizzy and off balance with a headache across her forehead. She also noticed her vision was a bit off and it was hard to focus on her computer. She had some nausea but no vomiting. She took Ibuprofen that evening and headache improved. This morning, she woke up and noticed that the right orbital region was tender to the touch and looked somewhat swollen. She continues to have "foggy" vision to right eye. All other symptoms from the previous day have resolved. Her blood pressure is elevated today. She has been working with PCP to get her BP under control for quite some time now, and her BP is typically elevated. She is typically asymptomatic with elevation in blood pressure. She did not try medication today for her symptoms. She has no additional complaints and is otherwise healthy on today's visit.    Pertinent negative as stated in ROS.    Past Medical History:   Diagnosis Date    Anxiety     Depression     History of acne     Hx of psychiatric care     Hyperlipidemia     Hypertension     Keloid cicatrix     Psychiatric problem     Psychosis     Sleep difficulties     Therapy        Past Surgical History:   Procedure Laterality Date     SECTION, CLASSIC      x 2    Laparoscopic bilateral tubal ligation.      TONSILLECTOMY         Social History     Socioeconomic History    Marital status:      Spouse name: Not on file    Number of children: 3    Years of education: Not on file    Highest education level: Not on file   Occupational History    Occupation:    Social Needs    Financial resource strain: Not on file    Food insecurity:     Worry: Not on file     Inability: Not on file    Transportation needs: "     Medical: Not on file     Non-medical: Not on file   Tobacco Use    Smoking status: Never Smoker    Smokeless tobacco: Never Used   Substance and Sexual Activity    Alcohol use: Yes     Comment: Social; rare use    Drug use: Yes     Types: Marijuana     Comment: uses infrequently    Sexual activity: Yes     Partners: Male     Birth control/protection: See Surgical Hx   Lifestyle    Physical activity:     Days per week: Not on file     Minutes per session: Not on file    Stress: Not on file   Relationships    Social connections:     Talks on phone: Not on file     Gets together: Not on file     Attends Hoahaoism service: Not on file     Active member of club or organization: Not on file     Attends meetings of clubs or organizations: Not on file     Relationship status: Not on file   Other Topics Concern    Are you pregnant or think you may be? Not Asked    Breast-feeding Not Asked    Patient feels they ought to cut down on drinking/drug use No    Patient annoyed by others criticizing their drinking/drug use No    Patient has felt bad or guilty about drinking/drug use No    Patient has had a drink/used drugs as an eye opener in the AM No   Social History Narrative    Engaged to Gemisimo.    Has 3 children.    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       Family History   Problem Relation Age of Onset    Hypertension Father     Heart disease Father 46    Aortic aneurysm Father         abdominal    Stroke Father 46    Hypertension Mother     Depression Sister     Breast cancer Sister 52    Schizophrenia Maternal Uncle     Schizophrenia Cousin     Cancer Cousin 50        cervical vs. ovarian    Ovarian cancer Neg Hx        Review of Systems  Review of Systems   Constitutional: Negative for fever.   HENT: Negative for congestion, ear pain, hearing loss, sinus pain, sore throat and tinnitus.    Eyes: Positive for blurred vision and pain. Negative for double vision, photophobia, discharge and redness.  "  Cardiovascular: Negative for chest pain and palpitations.   Gastrointestinal: Negative for nausea and vomiting.   Musculoskeletal: Negative for falls.   Skin: Negative for rash.   Neurological: Positive for headaches. Negative for dizziness, tingling, sensory change, speech change, focal weakness and loss of consciousness.     A complete review of systems was otherwise negative.    Physical Exam  BP (!) 191/85   Pulse 65   Temp 98.5 °F (36.9 °C) (Oral)   Ht 5' 6" (1.676 m)   Wt 87.5 kg (192 lb 14.4 oz)   LMP 10/20/2019   SpO2 97%   BMI 31.14 kg/m²   General appearance: alert, appears stated age, cooperative and no distress  Head: Normocephalic, without obvious abnormality, atraumatic  Eyes: negative findings: pupils equal, round, reactive to light and accomodation, visual fields full to confrontation and negative for nystagmus, conjunctivae injected on right side with periorbital swelling, erythema, and TTP on right side  Ears: normal TM's and external ear canals both ears  Nose: Nares normal. Septum midline. Mucosa normal. No drainage or sinus tenderness.  Throat: lips, mucosa, and tongue normal; teeth and gums normal  Neck: no JVD and supple, symmetrical, trachea midline  Lungs: clear to auscultation bilaterally  Heart: regular rate and rhythm, S1, S2 normal, no murmur, click, rub or gallop  Extremities: extremities normal, atraumatic, no cyanosis or edema  Pulses: 2+ and symmetric  Skin: Skin color, texture, turgor normal. No rashes or lesions  Lymph nodes: Cervical, supraclavicular, and axillary nodes normal.  Neurologic: Mental status: Alert, oriented, thought content appropriate  Cranial nerves: I: sense of smell present, II: visual field normal, II: pupils equal, round, reactive to light and accommodation, III,IV,VI: extraocular muscles extra-ocular motions intact, V: mastication normal, V: facial light touch sensation normal bilaterally, VII: upper facial muscle function normal bilaterally, VII: " lower facial muscle function normal bilaterally, VIII: hearing normal, IX: soft palate elevation normal in midline, IX,X: gag reflex present, XI: trapezius strength normal bilaterally, XI: sternocleidomastoid strength normal bilaterally, XI: neck flexion strength normal, XII: tongue strength normal   Sensory: normal  Motor:grossly normal  Coordination: finger to nose normal bilaterally  Gait: Normal    Assessment/Plan  Dizziness  Unclear etiology for patient's symptoms, concern given her presentation today.  I have ordered CT maxillofacial and CT head STAT.  Patient to [facility] for STAT CT. Stable for transfer in private vehicle with family.  CT team notified patient will be coming.  ER precautions discussed with patient.  Further work-up and follow-up pending results.  -     CT Head Without Contrast; Future; Expected date: 10/24/2019  -     CT Maxillofacial W WO Contrast; Future; Expected date: 10/24/2019    Orbital swelling  As above.  -     CT Head Without Contrast; Future; Expected date: 10/24/2019  -     CT Maxillofacial W WO Contrast; Future; Expected date: 10/24/2019    Visual disturbance  As above.  -     CT Head Without Contrast; Future; Expected date: 10/24/2019  -     CT Maxillofacial W WO Contrast; Future; Expected date: 10/24/2019    Acute intractable headache, unspecified headache type  As above.  -     CT Head Without Contrast; Future; Expected date: 10/24/2019  -     CT Maxillofacial W WO Contrast; Future; Expected date: 10/24/2019    Essential hypertension  As above.  -     CT Head Without Contrast; Future; Expected date: 10/24/2019  -     CT Maxillofacial W WO Contrast; Future; Expected date: 10/24/2019    Patient has verbalized understanding and is in agreement with plan of care.    Follow up for will call with results and schedule follow-up.

## 2019-10-25 ENCOUNTER — TELEPHONE (OUTPATIENT)
Dept: FAMILY MEDICINE | Facility: CLINIC | Age: 44
End: 2019-10-25

## 2019-10-25 NOTE — TELEPHONE ENCOUNTER
"Patient notified of information below and verbalized understanding.  She states, "It's the same, it's the exact same".  Informed that Cyndy would be notified.   "

## 2019-10-25 NOTE — TELEPHONE ENCOUNTER
Please let the patient know that her CT scans are normal. How is she feeling today?    Thanks!  ALYSSA Barton

## 2019-10-28 NOTE — TELEPHONE ENCOUNTER
Please contact the patient, if she is still not feeling any better- I recommend we get her in to be seen today. She can see any provider as I am out of the clinic.    Thanks!  ALYSSA Barton

## 2019-12-09 ENCOUNTER — TELEPHONE (OUTPATIENT)
Dept: FAMILY MEDICINE | Facility: CLINIC | Age: 44
End: 2019-12-09

## 2019-12-09 NOTE — TELEPHONE ENCOUNTER
Called pt to schedule apt for FMLA paperwork completion . Pt stated apt needed to be before 12/21/19 due that being due date . First opening was 12/16/19 at 4:20 pm , pt agreed to scheduling. Confirmed future attendance

## 2019-12-09 NOTE — TELEPHONE ENCOUNTER
----- Message from Henrik Vazquez MD sent at 12/9/2019 11:37 AM CST -----  Please schedule OV within next two weeks to complete FMLA paperwork.

## 2019-12-16 ENCOUNTER — OFFICE VISIT (OUTPATIENT)
Dept: FAMILY MEDICINE | Facility: CLINIC | Age: 44
End: 2019-12-16
Payer: COMMERCIAL

## 2019-12-16 VITALS
HEIGHT: 66 IN | DIASTOLIC BLOOD PRESSURE: 94 MMHG | OXYGEN SATURATION: 100 % | SYSTOLIC BLOOD PRESSURE: 157 MMHG | RESPIRATION RATE: 17 BRPM | HEART RATE: 63 BPM | WEIGHT: 197.56 LBS | TEMPERATURE: 98 F | BODY MASS INDEX: 31.75 KG/M2

## 2019-12-16 DIAGNOSIS — I10 ESSENTIAL HYPERTENSION: ICD-10-CM

## 2019-12-16 DIAGNOSIS — I1A.0 RESISTANT HYPERTENSION: Primary | ICD-10-CM

## 2019-12-16 DIAGNOSIS — F32.A DEPRESSION, UNSPECIFIED DEPRESSION TYPE: ICD-10-CM

## 2019-12-16 DIAGNOSIS — I10 HYPERTENSION, ESSENTIAL: ICD-10-CM

## 2019-12-16 DIAGNOSIS — F41.9 ANXIETY: ICD-10-CM

## 2019-12-16 PROCEDURE — 99999 PR PBB SHADOW E&M-EST. PATIENT-LVL IV: ICD-10-PCS | Mod: PBBFAC,,, | Performed by: INTERNAL MEDICINE

## 2019-12-16 PROCEDURE — 99214 PR OFFICE/OUTPT VISIT, EST, LEVL IV, 30-39 MIN: ICD-10-PCS | Mod: S$GLB,,, | Performed by: INTERNAL MEDICINE

## 2019-12-16 PROCEDURE — 99999 PR PBB SHADOW E&M-EST. PATIENT-LVL IV: CPT | Mod: PBBFAC,,, | Performed by: INTERNAL MEDICINE

## 2019-12-16 PROCEDURE — 99214 OFFICE O/P EST MOD 30 MIN: CPT | Mod: S$GLB,,, | Performed by: INTERNAL MEDICINE

## 2019-12-16 RX ORDER — METOPROLOL SUCCINATE 100 MG/1
200 TABLET, EXTENDED RELEASE ORAL DAILY
Qty: 180 TABLET | Refills: 1 | Status: SHIPPED | OUTPATIENT
Start: 2019-12-16 | End: 2020-03-16

## 2019-12-16 RX ORDER — VALSARTAN 320 MG/1
320 TABLET ORAL DAILY
Qty: 90 TABLET | Refills: 1 | Status: SHIPPED | OUTPATIENT
Start: 2019-12-16 | End: 2020-03-16

## 2019-12-16 RX ORDER — SPIRONOLACTONE 50 MG/1
50 TABLET, FILM COATED ORAL DAILY
Qty: 30 TABLET | Refills: 11 | Status: SHIPPED | OUTPATIENT
Start: 2019-12-16 | End: 2020-03-16

## 2019-12-16 RX ORDER — CHLORTHALIDONE 25 MG/1
25 TABLET ORAL DAILY
Qty: 90 TABLET | Refills: 3 | Status: SHIPPED | OUTPATIENT
Start: 2019-12-16 | End: 2020-03-16

## 2019-12-16 RX ORDER — FLUOXETINE HYDROCHLORIDE 20 MG/1
20 CAPSULE ORAL DAILY
Qty: 90 CAPSULE | Refills: 3 | Status: SHIPPED | OUTPATIENT
Start: 2019-12-16 | End: 2021-01-27 | Stop reason: SDUPTHER

## 2019-12-16 NOTE — PROGRESS NOTES
"Subjective:       Patient ID: Navjot Segovia is a 44 y.o. female.    Chief Complaint: Establish Care and Follow-up ([facility] paperwork)    Hypertension   This is a chronic problem. The current episode started more than 1 year ago. The problem is unchanged. The problem is uncontrolled. Associated symptoms include headaches and malaise/fatigue. Pertinent negatives include no anxiety, chest pain, palpitations, peripheral edema or shortness of breath. Risk factors for coronary artery disease include obesity and sedentary lifestyle. Past treatments include angiotensin blockers, calcium channel blockers, diuretics and beta blockers. The current treatment provides mild improvement. Identifiable causes of hypertension include sleep apnea (unable to tolerate CPAP due to anxiety).   mood "okay" using fluoxetine daily. Felt zyprexa was too sedating   Review of Systems   Constitutional: Positive for malaise/fatigue. Negative for activity change, appetite change, fatigue, fever and unexpected weight change.   HENT: Negative for ear pain, rhinorrhea and sore throat.    Eyes: Negative for discharge and visual disturbance.   Respiratory: Negative for chest tightness, shortness of breath and wheezing.    Cardiovascular: Negative for chest pain, palpitations and leg swelling.   Gastrointestinal: Negative for abdominal pain, constipation and diarrhea.   Endocrine: Negative for cold intolerance and heat intolerance.   Genitourinary: Negative for dysuria and hematuria.   Musculoskeletal: Negative for joint swelling and neck stiffness.   Skin: Negative for rash.   Neurological: Positive for headaches. Negative for dizziness, syncope and weakness.   Psychiatric/Behavioral: Negative for suicidal ideas.       Objective:     Vitals:    12/16/19 1600 12/16/19 1602   BP: (!) 172/96 (!) 157/94   BP Location: Right arm    Patient Position: Sitting    Pulse: 63    Resp: 17    Temp: 98.2 °F (36.8 °C)    TempSrc: Oral    SpO2: 100%    Weight: " "89.6 kg (197 lb 8.5 oz)    Height: 5' 6" (1.676 m)           Physical Exam   Constitutional: She is oriented to person, place, and time. She appears well-developed and well-nourished.   HENT:   Head: Normocephalic and atraumatic.   Eyes: Conjunctivae are normal. No scleral icterus.   Neck: Normal range of motion.   Cardiovascular: Normal rate and regular rhythm. Exam reveals no gallop and no friction rub.   No murmur heard.  No LE edema   Pulmonary/Chest: Effort normal and breath sounds normal. She has no wheezes. She has no rales.   Abdominal: Soft. Bowel sounds are normal. There is no tenderness. There is no rebound and no guarding.   Musculoskeletal: Normal range of motion. She exhibits no edema or tenderness.   Neurological: She is alert and oriented to person, place, and time. No cranial nerve deficit.   Skin: Skin is warm and dry.   Psychiatric: She has a normal mood and affect.       Assessment and Plan   1. Essential hypertension  Add aldactone in light of persistant elevation  - chlorthalidone (HYGROTEN) 25 MG Tab; Take 1 tablet (25 mg total) by mouth once daily.  Dispense: 90 tablet; Refill: 3  - valsartan (DIOVAN) 320 MG tablet; Take 1 tablet (320 mg total) by mouth once daily.  Dispense: 90 tablet; Refill: 1  - metoprolol succinate (TOPROL-XL) 100 MG 24 hr tablet; Take 2 tablets (200 mg total) by mouth once daily.  Dispense: 180 tablet; Refill: 1  - CBC auto differential; Future  - Comprehensive metabolic panel; Future    2. Hypertension, essential  As above, check electrolytes in two weeks with BP check     3. Anxiety  See below    4. Depression, unspecified depression type  Improved continue ssri   - FLUoxetine 20 MG capsule; Take 1 capsule (20 mg total) by mouth once daily.  Dispense: 90 capsule; Refill: 3    5. Resistant hypertension  adlactone for resistant HTN   - spironolactone (ALDACTONE) 50 MG tablet; Take 1 tablet (50 mg total) by mouth once daily.  Dispense: 30 tablet; Refill: 11  "

## 2019-12-19 ENCOUNTER — PATIENT MESSAGE (OUTPATIENT)
Dept: FAMILY MEDICINE | Facility: CLINIC | Age: 44
End: 2019-12-19

## 2020-03-16 ENCOUNTER — TELEPHONE (OUTPATIENT)
Dept: FAMILY MEDICINE | Facility: CLINIC | Age: 45
End: 2020-03-16

## 2020-03-16 ENCOUNTER — PATIENT MESSAGE (OUTPATIENT)
Dept: FAMILY MEDICINE | Facility: CLINIC | Age: 45
End: 2020-03-16

## 2020-03-16 DIAGNOSIS — I1A.0 RESISTANT HYPERTENSION: ICD-10-CM

## 2020-03-16 DIAGNOSIS — F41.9 ANXIETY: Primary | ICD-10-CM

## 2020-03-16 DIAGNOSIS — J30.89 NON-SEASONAL ALLERGIC RHINITIS, UNSPECIFIED TRIGGER: ICD-10-CM

## 2020-03-16 DIAGNOSIS — I10 ESSENTIAL HYPERTENSION: ICD-10-CM

## 2020-03-16 RX ORDER — LORAZEPAM 0.5 MG/1
0.5 TABLET ORAL EVERY 12 HOURS PRN
Qty: 12 TABLET | Refills: 0 | Status: SHIPPED | OUTPATIENT
Start: 2020-03-16 | End: 2021-01-27 | Stop reason: ALTCHOICE

## 2020-03-16 RX ORDER — CHLORTHALIDONE 25 MG/1
25 TABLET ORAL DAILY
Qty: 90 TABLET | Refills: 3 | Status: SHIPPED | OUTPATIENT
Start: 2020-03-16 | End: 2021-03-19 | Stop reason: SDUPTHER

## 2020-03-16 RX ORDER — VALSARTAN 320 MG/1
320 TABLET ORAL DAILY
Qty: 90 TABLET | Refills: 1 | Status: SHIPPED | OUTPATIENT
Start: 2020-03-16 | End: 2020-12-21

## 2020-03-16 RX ORDER — METOPROLOL SUCCINATE 100 MG/1
200 TABLET, EXTENDED RELEASE ORAL DAILY
Qty: 180 TABLET | Refills: 1 | Status: SHIPPED | OUTPATIENT
Start: 2020-03-16 | End: 2021-03-19 | Stop reason: SDUPTHER

## 2020-03-16 RX ORDER — AZELASTINE 1 MG/ML
1 SPRAY, METERED NASAL 2 TIMES DAILY
Qty: 30 ML | Refills: 1 | Status: SHIPPED | OUTPATIENT
Start: 2020-03-16 | End: 2021-03-19

## 2020-03-16 RX ORDER — FLUTICASONE PROPIONATE 50 MCG
1 SPRAY, SUSPENSION (ML) NASAL 2 TIMES DAILY
Qty: 16 G | Refills: 1 | Status: SHIPPED | OUTPATIENT
Start: 2020-03-16 | End: 2021-03-19

## 2020-03-16 RX ORDER — SPIRONOLACTONE 50 MG/1
50 TABLET, FILM COATED ORAL DAILY
Qty: 90 TABLET | Refills: 0 | Status: SHIPPED | OUTPATIENT
Start: 2020-03-16 | End: 2021-01-27

## 2020-03-16 NOTE — TELEPHONE ENCOUNTER
Patient contacted and ID confirmed by name and       Last week had episode after hearing she may have been exposed to COVID + person where she felt acutely short of breath with fast breathing. She did have some relief with hydroxyzine. She does not worse nasal congestion and itchy water eyes. No fever. Mild non productive cough x two weeks. Not using any nose spray. Also requesting three month supply of BP medications. Recommend self isolation likely more allergy symptoms given rhinnorhea and itching eyes. Nasal steroid and antihistamine BID

## 2020-04-21 DIAGNOSIS — Z01.84 ANTIBODY RESPONSE EXAMINATION: ICD-10-CM

## 2020-05-21 DIAGNOSIS — Z01.84 ANTIBODY RESPONSE EXAMINATION: ICD-10-CM

## 2020-05-29 ENCOUNTER — PATIENT MESSAGE (OUTPATIENT)
Dept: ADMINISTRATIVE | Facility: HOSPITAL | Age: 45
End: 2020-05-29

## 2020-06-20 DIAGNOSIS — Z01.84 ANTIBODY RESPONSE EXAMINATION: ICD-10-CM

## 2020-07-20 DIAGNOSIS — Z01.84 ANTIBODY RESPONSE EXAMINATION: ICD-10-CM

## 2020-08-14 DIAGNOSIS — Z11.59 NEED FOR HEPATITIS C SCREENING TEST: ICD-10-CM

## 2020-08-19 DIAGNOSIS — Z01.84 ANTIBODY RESPONSE EXAMINATION: ICD-10-CM

## 2020-09-18 DIAGNOSIS — Z01.84 ANTIBODY RESPONSE EXAMINATION: ICD-10-CM

## 2020-10-18 DIAGNOSIS — Z01.84 ANTIBODY RESPONSE EXAMINATION: ICD-10-CM

## 2020-11-17 DIAGNOSIS — Z01.84 ANTIBODY RESPONSE EXAMINATION: ICD-10-CM

## 2020-12-20 DIAGNOSIS — I10 ESSENTIAL HYPERTENSION: ICD-10-CM

## 2020-12-21 RX ORDER — VALSARTAN 320 MG/1
320 TABLET ORAL DAILY
Qty: 30 TABLET | Refills: 0 | Status: SHIPPED | OUTPATIENT
Start: 2020-12-21 | End: 2021-01-27 | Stop reason: SDUPTHER

## 2020-12-22 ENCOUNTER — PATIENT MESSAGE (OUTPATIENT)
Dept: FAMILY MEDICINE | Facility: CLINIC | Age: 45
End: 2020-12-22

## 2021-01-04 ENCOUNTER — PATIENT MESSAGE (OUTPATIENT)
Dept: ADMINISTRATIVE | Facility: HOSPITAL | Age: 46
End: 2021-01-04

## 2021-01-06 DIAGNOSIS — Z12.31 OTHER SCREENING MAMMOGRAM: ICD-10-CM

## 2021-01-15 DIAGNOSIS — I10 ESSENTIAL HYPERTENSION: ICD-10-CM

## 2021-01-15 RX ORDER — VALSARTAN 320 MG/1
320 TABLET ORAL DAILY
Qty: 30 TABLET | Refills: 0 | OUTPATIENT
Start: 2021-01-15

## 2021-01-27 ENCOUNTER — PATIENT MESSAGE (OUTPATIENT)
Dept: FAMILY MEDICINE | Facility: CLINIC | Age: 46
End: 2021-01-27

## 2021-01-27 ENCOUNTER — OFFICE VISIT (OUTPATIENT)
Dept: FAMILY MEDICINE | Facility: CLINIC | Age: 46
End: 2021-01-27
Payer: COMMERCIAL

## 2021-01-27 ENCOUNTER — LAB VISIT (OUTPATIENT)
Dept: LAB | Facility: HOSPITAL | Age: 46
End: 2021-01-27
Attending: INTERNAL MEDICINE
Payer: COMMERCIAL

## 2021-01-27 VITALS
SYSTOLIC BLOOD PRESSURE: 164 MMHG | DIASTOLIC BLOOD PRESSURE: 92 MMHG | OXYGEN SATURATION: 99 % | TEMPERATURE: 98 F | BODY MASS INDEX: 27.75 KG/M2 | HEART RATE: 64 BPM | WEIGHT: 171.94 LBS

## 2021-01-27 DIAGNOSIS — I10 ESSENTIAL HYPERTENSION: ICD-10-CM

## 2021-01-27 DIAGNOSIS — L65.9 HAIR LOSS: ICD-10-CM

## 2021-01-27 DIAGNOSIS — I10 HYPERTENSION, ESSENTIAL: ICD-10-CM

## 2021-01-27 DIAGNOSIS — I10 HYPERTENSION, ESSENTIAL: Primary | ICD-10-CM

## 2021-01-27 DIAGNOSIS — Z11.59 NEED FOR HEPATITIS C SCREENING TEST: ICD-10-CM

## 2021-01-27 DIAGNOSIS — F32.A DEPRESSION, UNSPECIFIED DEPRESSION TYPE: ICD-10-CM

## 2021-01-27 LAB
ALBUMIN SERPL BCP-MCNC: 4.1 G/DL (ref 3.5–5.2)
ALP SERPL-CCNC: 17 U/L (ref 55–135)
ALT SERPL W/O P-5'-P-CCNC: 18 U/L (ref 10–44)
ANION GAP SERPL CALC-SCNC: 9 MMOL/L (ref 8–16)
AST SERPL-CCNC: 17 U/L (ref 10–40)
BASOPHILS # BLD AUTO: 0.02 K/UL (ref 0–0.2)
BASOPHILS NFR BLD: 0.5 % (ref 0–1.9)
BILIRUB SERPL-MCNC: 0.8 MG/DL (ref 0.1–1)
BUN SERPL-MCNC: 14 MG/DL (ref 6–20)
CALCIUM SERPL-MCNC: 9.2 MG/DL (ref 8.7–10.5)
CHLORIDE SERPL-SCNC: 101 MMOL/L (ref 95–110)
CO2 SERPL-SCNC: 29 MMOL/L (ref 23–29)
CREAT SERPL-MCNC: 1.1 MG/DL (ref 0.5–1.4)
DIFFERENTIAL METHOD: ABNORMAL
EOSINOPHIL # BLD AUTO: 0.1 K/UL (ref 0–0.5)
EOSINOPHIL NFR BLD: 1.4 % (ref 0–8)
ERYTHROCYTE [DISTWIDTH] IN BLOOD BY AUTOMATED COUNT: 12.6 % (ref 11.5–14.5)
EST. GFR  (AFRICAN AMERICAN): >60 ML/MIN/1.73 M^2
EST. GFR  (NON AFRICAN AMERICAN): >60 ML/MIN/1.73 M^2
GLUCOSE SERPL-MCNC: 94 MG/DL (ref 70–110)
HCT VFR BLD AUTO: 43 % (ref 37–48.5)
HGB BLD-MCNC: 15.1 G/DL (ref 12–16)
IMM GRANULOCYTES # BLD AUTO: 0.01 K/UL (ref 0–0.04)
IMM GRANULOCYTES NFR BLD AUTO: 0.2 % (ref 0–0.5)
LYMPHOCYTES # BLD AUTO: 1.5 K/UL (ref 1–4.8)
LYMPHOCYTES NFR BLD: 33.9 % (ref 18–48)
MCH RBC QN AUTO: 31.8 PG (ref 27–31)
MCHC RBC AUTO-ENTMCNC: 35.1 G/DL (ref 32–36)
MCV RBC AUTO: 91 FL (ref 82–98)
MONOCYTES # BLD AUTO: 0.5 K/UL (ref 0.3–1)
MONOCYTES NFR BLD: 10.8 % (ref 4–15)
NEUTROPHILS # BLD AUTO: 2.3 K/UL (ref 1.8–7.7)
NEUTROPHILS NFR BLD: 53.2 % (ref 38–73)
NRBC BLD-RTO: 0 /100 WBC
PLATELET # BLD AUTO: 293 K/UL (ref 150–350)
PMV BLD AUTO: 10.5 FL (ref 9.2–12.9)
POTASSIUM SERPL-SCNC: 3.6 MMOL/L (ref 3.5–5.1)
PROT SERPL-MCNC: 7.4 G/DL (ref 6–8.4)
RBC # BLD AUTO: 4.75 M/UL (ref 4–5.4)
SODIUM SERPL-SCNC: 139 MMOL/L (ref 136–145)
TSH SERPL DL<=0.005 MIU/L-ACNC: 1.41 UIU/ML (ref 0.4–4)
WBC # BLD AUTO: 4.36 K/UL (ref 3.9–12.7)

## 2021-01-27 PROCEDURE — 99214 OFFICE O/P EST MOD 30 MIN: CPT | Mod: S$GLB,,, | Performed by: INTERNAL MEDICINE

## 2021-01-27 PROCEDURE — 80053 COMPREHEN METABOLIC PANEL: CPT

## 2021-01-27 PROCEDURE — 99999 PR PBB SHADOW E&M-EST. PATIENT-LVL IV: ICD-10-PCS | Mod: PBBFAC,,, | Performed by: INTERNAL MEDICINE

## 2021-01-27 PROCEDURE — 3077F PR MOST RECENT SYSTOLIC BLOOD PRESSURE >= 140 MM HG: ICD-10-PCS | Mod: CPTII,S$GLB,, | Performed by: INTERNAL MEDICINE

## 2021-01-27 PROCEDURE — 86803 HEPATITIS C AB TEST: CPT

## 2021-01-27 PROCEDURE — 3077F SYST BP >= 140 MM HG: CPT | Mod: CPTII,S$GLB,, | Performed by: INTERNAL MEDICINE

## 2021-01-27 PROCEDURE — 99999 PR PBB SHADOW E&M-EST. PATIENT-LVL IV: CPT | Mod: PBBFAC,,, | Performed by: INTERNAL MEDICINE

## 2021-01-27 PROCEDURE — 3080F DIAST BP >= 90 MM HG: CPT | Mod: CPTII,S$GLB,, | Performed by: INTERNAL MEDICINE

## 2021-01-27 PROCEDURE — 36415 COLL VENOUS BLD VENIPUNCTURE: CPT | Mod: PN

## 2021-01-27 PROCEDURE — 3008F PR BODY MASS INDEX (BMI) DOCUMENTED: ICD-10-PCS | Mod: CPTII,S$GLB,, | Performed by: INTERNAL MEDICINE

## 2021-01-27 PROCEDURE — 85025 COMPLETE CBC W/AUTO DIFF WBC: CPT

## 2021-01-27 PROCEDURE — 3080F PR MOST RECENT DIASTOLIC BLOOD PRESSURE >= 90 MM HG: ICD-10-PCS | Mod: CPTII,S$GLB,, | Performed by: INTERNAL MEDICINE

## 2021-01-27 PROCEDURE — 3008F BODY MASS INDEX DOCD: CPT | Mod: CPTII,S$GLB,, | Performed by: INTERNAL MEDICINE

## 2021-01-27 PROCEDURE — 99214 PR OFFICE/OUTPT VISIT, EST, LEVL IV, 30-39 MIN: ICD-10-PCS | Mod: S$GLB,,, | Performed by: INTERNAL MEDICINE

## 2021-01-27 PROCEDURE — 84443 ASSAY THYROID STIM HORMONE: CPT

## 2021-01-27 RX ORDER — FLUOXETINE HYDROCHLORIDE 20 MG/1
20 CAPSULE ORAL DAILY
Qty: 90 CAPSULE | Refills: 3 | Status: SHIPPED | OUTPATIENT
Start: 2021-01-27 | End: 2022-06-01 | Stop reason: SDUPTHER

## 2021-01-27 RX ORDER — VALSARTAN 320 MG/1
320 TABLET ORAL DAILY
Qty: 90 TABLET | Refills: 0 | Status: SHIPPED | OUTPATIENT
Start: 2021-01-27 | End: 2021-03-19 | Stop reason: SDUPTHER

## 2021-01-28 LAB — HCV AB SERPL QL IA: NEGATIVE

## 2021-01-29 ENCOUNTER — PATIENT MESSAGE (OUTPATIENT)
Dept: ADMINISTRATIVE | Facility: HOSPITAL | Age: 46
End: 2021-01-29

## 2021-03-15 ENCOUNTER — HOSPITAL ENCOUNTER (EMERGENCY)
Facility: HOSPITAL | Age: 46
Discharge: HOME | End: 2021-03-15
Attending: EMERGENCY MEDICINE
Payer: COMMERCIAL

## 2021-03-15 VITALS
OXYGEN SATURATION: 100 % | RESPIRATION RATE: 16 BRPM | HEIGHT: 66 IN | TEMPERATURE: 98 F | BODY MASS INDEX: 27.32 KG/M2 | HEART RATE: 64 BPM | SYSTOLIC BLOOD PRESSURE: 194 MMHG | DIASTOLIC BLOOD PRESSURE: 134 MMHG | WEIGHT: 170 LBS

## 2021-03-15 DIAGNOSIS — K12.2 UVULITIS: Primary | ICD-10-CM

## 2021-03-15 LAB — GROUP A STREP, MOLECULAR: NEGATIVE

## 2021-03-15 PROCEDURE — 99284 EMERGENCY DEPT VISIT MOD MDM: CPT | Mod: ,,, | Performed by: PHYSICIAN ASSISTANT

## 2021-03-15 PROCEDURE — 96372 THER/PROPH/DIAG INJ SC/IM: CPT

## 2021-03-15 PROCEDURE — 87880 STREP A ASSAY W/OPTIC: CPT

## 2021-03-15 PROCEDURE — 99284 EMERGENCY DEPT VISIT MOD MDM: CPT | Mod: 25

## 2021-03-15 PROCEDURE — 63600175 PHARM REV CODE 636 W HCPCS: Performed by: PHYSICIAN ASSISTANT

## 2021-03-15 PROCEDURE — 87651 STREP A DNA AMP PROBE: CPT | Performed by: PHYSICIAN ASSISTANT

## 2021-03-15 PROCEDURE — 99284 PR EMERGENCY DEPT VISIT,LEVEL IV: ICD-10-PCS | Mod: ,,, | Performed by: PHYSICIAN ASSISTANT

## 2021-03-15 RX ORDER — METHYLPREDNISOLONE 4 MG/1
TABLET ORAL
Qty: 1 PACKAGE | Refills: 0 | OUTPATIENT
Start: 2021-03-15 | End: 2021-12-18

## 2021-03-15 RX ORDER — METHYLPREDNISOLONE SOD SUCC 125 MG
125 VIAL (EA) INJECTION
Status: COMPLETED | OUTPATIENT
Start: 2021-03-15 | End: 2021-03-15

## 2021-03-15 RX ADMIN — METHYLPREDNISOLONE SODIUM SUCCINATE 125 MG: 125 INJECTION, POWDER, FOR SOLUTION INTRAMUSCULAR; INTRAVENOUS at 09:03

## 2021-03-16 ENCOUNTER — HOSPITAL ENCOUNTER (OUTPATIENT)
Dept: RADIOLOGY | Facility: HOSPITAL | Age: 46
Discharge: HOME | End: 2021-03-16
Attending: INTERNAL MEDICINE
Payer: COMMERCIAL

## 2021-03-16 VITALS — HEIGHT: 66 IN | WEIGHT: 170 LBS | BODY MASS INDEX: 27.32 KG/M2

## 2021-03-16 DIAGNOSIS — Z12.31 OTHER SCREENING MAMMOGRAM: ICD-10-CM

## 2021-03-16 PROCEDURE — 77067 SCR MAMMO BI INCL CAD: CPT | Mod: 26,,, | Performed by: RADIOLOGY

## 2021-03-16 PROCEDURE — 77067 MAMMO DIGITAL SCREENING BILAT WITH TOMO: ICD-10-PCS | Mod: 26,,, | Performed by: RADIOLOGY

## 2021-03-16 PROCEDURE — 77063 BREAST TOMOSYNTHESIS BI: CPT | Mod: 26,,, | Performed by: RADIOLOGY

## 2021-03-16 PROCEDURE — 77067 SCR MAMMO BI INCL CAD: CPT | Mod: TC

## 2021-03-16 PROCEDURE — 77063 MAMMO DIGITAL SCREENING BILAT WITH TOMO: ICD-10-PCS | Mod: 26,,, | Performed by: RADIOLOGY

## 2021-03-19 ENCOUNTER — OFFICE VISIT (OUTPATIENT)
Dept: FAMILY MEDICINE | Facility: CLINIC | Age: 46
End: 2021-03-19
Payer: COMMERCIAL

## 2021-03-19 VITALS
SYSTOLIC BLOOD PRESSURE: 148 MMHG | WEIGHT: 168.63 LBS | RESPIRATION RATE: 17 BRPM | DIASTOLIC BLOOD PRESSURE: 96 MMHG | OXYGEN SATURATION: 97 % | BODY MASS INDEX: 27.22 KG/M2 | HEART RATE: 72 BPM | TEMPERATURE: 98 F

## 2021-03-19 DIAGNOSIS — I10 ESSENTIAL HYPERTENSION: ICD-10-CM

## 2021-03-19 PROCEDURE — 3008F BODY MASS INDEX DOCD: CPT | Mod: CPTII,S$GLB,, | Performed by: INTERNAL MEDICINE

## 2021-03-19 PROCEDURE — 3008F PR BODY MASS INDEX (BMI) DOCUMENTED: ICD-10-PCS | Mod: CPTII,S$GLB,, | Performed by: INTERNAL MEDICINE

## 2021-03-19 PROCEDURE — 99999 PR PBB SHADOW E&M-EST. PATIENT-LVL IV: CPT | Mod: PBBFAC,,, | Performed by: INTERNAL MEDICINE

## 2021-03-19 PROCEDURE — 3074F PR MOST RECENT SYSTOLIC BLOOD PRESSURE < 130 MM HG: ICD-10-PCS | Mod: CPTII,S$GLB,, | Performed by: INTERNAL MEDICINE

## 2021-03-19 PROCEDURE — 3078F PR MOST RECENT DIASTOLIC BLOOD PRESSURE < 80 MM HG: ICD-10-PCS | Mod: CPTII,S$GLB,, | Performed by: INTERNAL MEDICINE

## 2021-03-19 PROCEDURE — 3078F DIAST BP <80 MM HG: CPT | Mod: CPTII,S$GLB,, | Performed by: INTERNAL MEDICINE

## 2021-03-19 PROCEDURE — 99214 OFFICE O/P EST MOD 30 MIN: CPT | Mod: S$GLB,,, | Performed by: INTERNAL MEDICINE

## 2021-03-19 PROCEDURE — 99999 PR PBB SHADOW E&M-EST. PATIENT-LVL IV: ICD-10-PCS | Mod: PBBFAC,,, | Performed by: INTERNAL MEDICINE

## 2021-03-19 PROCEDURE — 99214 PR OFFICE/OUTPT VISIT, EST, LEVL IV, 30-39 MIN: ICD-10-PCS | Mod: S$GLB,,, | Performed by: INTERNAL MEDICINE

## 2021-03-19 PROCEDURE — 3074F SYST BP LT 130 MM HG: CPT | Mod: CPTII,S$GLB,, | Performed by: INTERNAL MEDICINE

## 2021-03-19 RX ORDER — CHLORTHALIDONE 25 MG/1
25 TABLET ORAL DAILY
Qty: 90 TABLET | Refills: 3 | Status: SHIPPED | OUTPATIENT
Start: 2021-03-19 | End: 2022-03-25 | Stop reason: SDUPTHER

## 2021-03-19 RX ORDER — VALSARTAN 320 MG/1
320 TABLET ORAL DAILY
Qty: 90 TABLET | Refills: 3 | Status: SHIPPED | OUTPATIENT
Start: 2021-03-19 | End: 2022-04-18 | Stop reason: SDUPTHER

## 2021-03-19 RX ORDER — METOPROLOL SUCCINATE 100 MG/1
200 TABLET, EXTENDED RELEASE ORAL DAILY
Qty: 180 TABLET | Refills: 3 | Status: SHIPPED | OUTPATIENT
Start: 2021-03-19 | End: 2023-03-29

## 2021-03-23 RX ORDER — METRONIDAZOLE 500 MG/1
500 TABLET ORAL 2 TIMES DAILY
Qty: 14 TABLET | Refills: 0 | Status: SHIPPED | OUTPATIENT
Start: 2021-03-23 | End: 2021-03-30

## 2021-03-23 RX ORDER — FLUCONAZOLE 150 MG/1
150 TABLET ORAL DAILY
Qty: 1 TABLET | Refills: 0 | Status: SHIPPED | OUTPATIENT
Start: 2021-03-23 | End: 2021-03-24

## 2021-07-23 ENCOUNTER — IMMUNIZATION (OUTPATIENT)
Dept: PRIMARY CARE CLINIC | Facility: CLINIC | Age: 46
End: 2021-07-23

## 2021-07-23 DIAGNOSIS — Z23 NEED FOR VACCINATION: Primary | ICD-10-CM

## 2021-07-23 PROCEDURE — 91300 COVID-19, MRNA, LNP-S, PF, 30 MCG/0.3 ML DOSE VACCINE: CPT | Mod: S$GLB,,, | Performed by: INTERNAL MEDICINE

## 2021-07-23 PROCEDURE — 0001A COVID-19, MRNA, LNP-S, PF, 30 MCG/0.3 ML DOSE VACCINE: CPT | Mod: CV19,S$GLB,, | Performed by: INTERNAL MEDICINE

## 2021-07-23 PROCEDURE — 0001A COVID-19, MRNA, LNP-S, PF, 30 MCG/0.3 ML DOSE VACCINE: ICD-10-PCS | Mod: CV19,S$GLB,, | Performed by: INTERNAL MEDICINE

## 2021-07-23 PROCEDURE — 91300 COVID-19, MRNA, LNP-S, PF, 30 MCG/0.3 ML DOSE VACCINE: ICD-10-PCS | Mod: S$GLB,,, | Performed by: INTERNAL MEDICINE

## 2021-08-14 ENCOUNTER — IMMUNIZATION (OUTPATIENT)
Dept: PRIMARY CARE CLINIC | Facility: CLINIC | Age: 46
End: 2021-08-14
Payer: MEDICAID

## 2021-08-14 DIAGNOSIS — Z23 NEED FOR VACCINATION: Primary | ICD-10-CM

## 2021-08-14 PROCEDURE — 0002A COVID-19, MRNA, LNP-S, PF, 30 MCG/0.3 ML DOSE VACCINE: CPT | Mod: CV19,S$GLB,, | Performed by: INTERNAL MEDICINE

## 2021-08-14 PROCEDURE — 0002A COVID-19, MRNA, LNP-S, PF, 30 MCG/0.3 ML DOSE VACCINE: ICD-10-PCS | Mod: CV19,S$GLB,, | Performed by: INTERNAL MEDICINE

## 2021-08-14 PROCEDURE — 91300 COVID-19, MRNA, LNP-S, PF, 30 MCG/0.3 ML DOSE VACCINE: ICD-10-PCS | Mod: S$GLB,,, | Performed by: INTERNAL MEDICINE

## 2021-08-14 PROCEDURE — 91300 COVID-19, MRNA, LNP-S, PF, 30 MCG/0.3 ML DOSE VACCINE: CPT | Mod: S$GLB,,, | Performed by: INTERNAL MEDICINE

## 2021-11-19 ENCOUNTER — PATIENT OUTREACH (OUTPATIENT)
Dept: ADMINISTRATIVE | Facility: OTHER | Age: 46
End: 2021-11-19
Payer: MEDICAID

## 2021-11-22 ENCOUNTER — LAB VISIT (OUTPATIENT)
Dept: LAB | Facility: HOSPITAL | Age: 46
End: 2021-11-22
Attending: OBSTETRICS & GYNECOLOGY
Payer: MEDICAID

## 2021-11-22 ENCOUNTER — OFFICE VISIT (OUTPATIENT)
Dept: OBSTETRICS AND GYNECOLOGY | Facility: CLINIC | Age: 46
End: 2021-11-22
Payer: MEDICAID

## 2021-11-22 VITALS
DIASTOLIC BLOOD PRESSURE: 90 MMHG | HEIGHT: 66 IN | WEIGHT: 174.63 LBS | SYSTOLIC BLOOD PRESSURE: 150 MMHG | BODY MASS INDEX: 28.06 KG/M2

## 2021-11-22 DIAGNOSIS — N93.9 ABNORMAL UTERINE BLEEDING (AUB): ICD-10-CM

## 2021-11-22 DIAGNOSIS — Z11.3 SCREEN FOR STD (SEXUALLY TRANSMITTED DISEASE): ICD-10-CM

## 2021-11-22 DIAGNOSIS — N76.0 BV (BACTERIAL VAGINOSIS): ICD-10-CM

## 2021-11-22 DIAGNOSIS — Z12.31 BREAST CANCER SCREENING BY MAMMOGRAM: ICD-10-CM

## 2021-11-22 DIAGNOSIS — B96.89 BV (BACTERIAL VAGINOSIS): ICD-10-CM

## 2021-11-22 DIAGNOSIS — Z01.419 ENCOUNTER FOR GYNECOLOGICAL EXAMINATION WITHOUT ABNORMAL FINDING: Primary | ICD-10-CM

## 2021-11-22 LAB
BASOPHILS # BLD AUTO: 0.02 K/UL (ref 0–0.2)
BASOPHILS NFR BLD: 0.4 % (ref 0–1.9)
DIFFERENTIAL METHOD: ABNORMAL
EOSINOPHIL # BLD AUTO: 0.1 K/UL (ref 0–0.5)
EOSINOPHIL NFR BLD: 1.5 % (ref 0–8)
ERYTHROCYTE [DISTWIDTH] IN BLOOD BY AUTOMATED COUNT: 12.7 % (ref 11.5–14.5)
HCT VFR BLD AUTO: 39.3 % (ref 37–48.5)
HGB BLD-MCNC: 14 G/DL (ref 12–16)
IMM GRANULOCYTES # BLD AUTO: 0.01 K/UL (ref 0–0.04)
IMM GRANULOCYTES NFR BLD AUTO: 0.2 % (ref 0–0.5)
LYMPHOCYTES # BLD AUTO: 1.7 K/UL (ref 1–4.8)
LYMPHOCYTES NFR BLD: 35.1 % (ref 18–48)
MCH RBC QN AUTO: 32.6 PG (ref 27–31)
MCHC RBC AUTO-ENTMCNC: 35.6 G/DL (ref 32–36)
MCV RBC AUTO: 91 FL (ref 82–98)
MONOCYTES # BLD AUTO: 0.6 K/UL (ref 0.3–1)
MONOCYTES NFR BLD: 12.9 % (ref 4–15)
NEUTROPHILS # BLD AUTO: 2.4 K/UL (ref 1.8–7.7)
NEUTROPHILS NFR BLD: 49.9 % (ref 38–73)
NRBC BLD-RTO: 0 /100 WBC
PLATELET # BLD AUTO: 303 K/UL (ref 150–450)
PMV BLD AUTO: 10 FL (ref 9.2–12.9)
RBC # BLD AUTO: 4.3 M/UL (ref 4–5.4)
T4 FREE SERPL-MCNC: 0.98 NG/DL (ref 0.71–1.51)
TSH SERPL DL<=0.005 MIU/L-ACNC: 2.93 UIU/ML (ref 0.4–4)
WBC # BLD AUTO: 4.82 K/UL (ref 3.9–12.7)

## 2021-11-22 PROCEDURE — 83002 ASSAY OF GONADOTROPIN (LH): CPT | Performed by: OBSTETRICS & GYNECOLOGY

## 2021-11-22 PROCEDURE — 87591 N.GONORRHOEAE DNA AMP PROB: CPT | Performed by: OBSTETRICS & GYNECOLOGY

## 2021-11-22 PROCEDURE — 86592 SYPHILIS TEST NON-TREP QUAL: CPT | Performed by: OBSTETRICS & GYNECOLOGY

## 2021-11-22 PROCEDURE — 85025 COMPLETE CBC W/AUTO DIFF WBC: CPT | Performed by: OBSTETRICS & GYNECOLOGY

## 2021-11-22 PROCEDURE — 99396 PR PREVENTIVE VISIT,EST,40-64: ICD-10-PCS | Mod: S$PBB,,, | Performed by: OBSTETRICS & GYNECOLOGY

## 2021-11-22 PROCEDURE — 36415 COLL VENOUS BLD VENIPUNCTURE: CPT | Performed by: OBSTETRICS & GYNECOLOGY

## 2021-11-22 PROCEDURE — 87389 HIV-1 AG W/HIV-1&-2 AB AG IA: CPT | Performed by: OBSTETRICS & GYNECOLOGY

## 2021-11-22 PROCEDURE — 84439 ASSAY OF FREE THYROXINE: CPT | Performed by: OBSTETRICS & GYNECOLOGY

## 2021-11-22 PROCEDURE — 84146 ASSAY OF PROLACTIN: CPT | Performed by: OBSTETRICS & GYNECOLOGY

## 2021-11-22 PROCEDURE — 99999 PR PBB SHADOW E&M-EST. PATIENT-LVL III: ICD-10-PCS | Mod: PBBFAC,,, | Performed by: OBSTETRICS & GYNECOLOGY

## 2021-11-22 PROCEDURE — 99396 PREV VISIT EST AGE 40-64: CPT | Mod: S$PBB,,, | Performed by: OBSTETRICS & GYNECOLOGY

## 2021-11-22 PROCEDURE — 87491 CHLMYD TRACH DNA AMP PROBE: CPT | Mod: 59 | Performed by: OBSTETRICS & GYNECOLOGY

## 2021-11-22 PROCEDURE — 84443 ASSAY THYROID STIM HORMONE: CPT | Performed by: OBSTETRICS & GYNECOLOGY

## 2021-11-22 PROCEDURE — 86803 HEPATITIS C AB TEST: CPT | Performed by: OBSTETRICS & GYNECOLOGY

## 2021-11-22 PROCEDURE — 83001 ASSAY OF GONADOTROPIN (FSH): CPT | Performed by: OBSTETRICS & GYNECOLOGY

## 2021-11-22 PROCEDURE — 87340 HEPATITIS B SURFACE AG IA: CPT | Performed by: OBSTETRICS & GYNECOLOGY

## 2021-11-22 PROCEDURE — 99999 PR PBB SHADOW E&M-EST. PATIENT-LVL III: CPT | Mod: PBBFAC,,, | Performed by: OBSTETRICS & GYNECOLOGY

## 2021-11-22 PROCEDURE — 87624 HPV HI-RISK TYP POOLED RSLT: CPT | Performed by: OBSTETRICS & GYNECOLOGY

## 2021-11-22 PROCEDURE — 99213 OFFICE O/P EST LOW 20 MIN: CPT | Mod: PBBFAC | Performed by: OBSTETRICS & GYNECOLOGY

## 2021-11-22 PROCEDURE — 87481 CANDIDA DNA AMP PROBE: CPT | Mod: 59 | Performed by: OBSTETRICS & GYNECOLOGY

## 2021-11-22 RX ORDER — CLINDAMYCIN HYDROCHLORIDE 300 MG/1
300 CAPSULE ORAL EVERY 12 HOURS
Qty: 14 CAPSULE | Refills: 0 | Status: SHIPPED | OUTPATIENT
Start: 2021-11-22 | End: 2021-11-29

## 2021-11-23 LAB
FSH SERPL-ACNC: 6.56 MIU/ML
HBV SURFACE AG SERPL QL IA: NEGATIVE
HCV AB SERPL QL IA: NEGATIVE
HIV 1+2 AB+HIV1 P24 AG SERPL QL IA: NEGATIVE
LH SERPL-ACNC: 5.3 MIU/ML
PROLACTIN SERPL IA-MCNC: 23.6 NG/ML (ref 5.2–26.5)

## 2021-11-24 LAB — RPR SER QL: NORMAL

## 2021-11-26 LAB
C TRACH DNA SPEC QL NAA+PROBE: NOT DETECTED
N GONORRHOEA DNA SPEC QL NAA+PROBE: NOT DETECTED

## 2021-11-29 ENCOUNTER — HOSPITAL ENCOUNTER (OUTPATIENT)
Dept: RADIOLOGY | Facility: HOSPITAL | Age: 46
Discharge: HOME | End: 2021-11-29
Attending: OBSTETRICS & GYNECOLOGY
Payer: MEDICAID

## 2021-11-29 DIAGNOSIS — N93.9 ABNORMAL UTERINE BLEEDING (AUB): ICD-10-CM

## 2021-11-29 LAB
BACTERIAL VAGINOSIS DNA: POSITIVE
CANDIDA GLABRATA DNA: NEGATIVE
CANDIDA KRUSEI DNA: NEGATIVE
CANDIDA RRNA VAG QL PROBE: NEGATIVE
T VAGINALIS RRNA GENITAL QL PROBE: POSITIVE

## 2021-11-29 PROCEDURE — 76856 US PELVIS COMP WITH TRANSVAG NON-OB (XPD): ICD-10-PCS | Mod: 26,,, | Performed by: RADIOLOGY

## 2021-11-29 PROCEDURE — 76830 TRANSVAGINAL US NON-OB: CPT | Mod: 26,,, | Performed by: RADIOLOGY

## 2021-11-29 PROCEDURE — 76856 US EXAM PELVIC COMPLETE: CPT | Mod: 26,,, | Performed by: RADIOLOGY

## 2021-11-29 PROCEDURE — 76830 US PELVIS COMP WITH TRANSVAG NON-OB (XPD): ICD-10-PCS | Mod: 26,,, | Performed by: RADIOLOGY

## 2021-11-29 PROCEDURE — 76856 US EXAM PELVIC COMPLETE: CPT | Mod: TC

## 2021-11-30 ENCOUNTER — PATIENT MESSAGE (OUTPATIENT)
Dept: OBSTETRICS AND GYNECOLOGY | Facility: CLINIC | Age: 46
End: 2021-11-30
Payer: MEDICAID

## 2021-11-30 DIAGNOSIS — A59.9 TRICHOMONIASIS: Primary | ICD-10-CM

## 2021-11-30 LAB
CYTOLOGIST CVX/VAG CYTO: NORMAL
CYTOLOGY CVX/VAG DOC CYTO: NORMAL
CYTOLOGY CVX/VAG DOC THIN PREP: NORMAL
CYTOLOGY THINPREP PAP COMMENT: NORMAL
HPV HR 12 DNA CVX QL NAA+PROBE: NEGATIVE
HPV16 DNA CVX QL NAA+PROBE: NEGATIVE
HPV18 DNA CVX QL NAA+PROBE: NEGATIVE
PAP NOTE: NORMAL
PAP QC REVIEWED BY: NORMAL
STAT OF ADQ CVX/VAG CYTO-IMP: NORMAL

## 2021-12-06 ENCOUNTER — PATIENT MESSAGE (OUTPATIENT)
Dept: OBSTETRICS AND GYNECOLOGY | Facility: CLINIC | Age: 46
End: 2021-12-06
Payer: MEDICAID

## 2021-12-06 RX ORDER — METRONIDAZOLE 500 MG/1
500 TABLET ORAL 2 TIMES DAILY
Qty: 14 TABLET | Refills: 0 | Status: SHIPPED | OUTPATIENT
Start: 2021-12-06 | End: 2021-12-13

## 2021-12-15 ENCOUNTER — PATIENT MESSAGE (OUTPATIENT)
Dept: ADMINISTRATIVE | Facility: HOSPITAL | Age: 46
End: 2021-12-15
Payer: MEDICAID

## 2021-12-18 ENCOUNTER — HOSPITAL ENCOUNTER (EMERGENCY)
Facility: HOSPITAL | Age: 46
Discharge: HOME | End: 2021-12-18
Attending: EMERGENCY MEDICINE
Payer: MEDICAID

## 2021-12-18 VITALS
BODY MASS INDEX: 28.12 KG/M2 | WEIGHT: 175 LBS | RESPIRATION RATE: 198 BRPM | OXYGEN SATURATION: 98 % | HEIGHT: 66 IN | TEMPERATURE: 98 F | DIASTOLIC BLOOD PRESSURE: 85 MMHG | HEART RATE: 62 BPM | SYSTOLIC BLOOD PRESSURE: 145 MMHG

## 2021-12-18 DIAGNOSIS — L02.91 ABSCESS: Primary | ICD-10-CM

## 2021-12-18 LAB
B-HCG UR QL: NEGATIVE
CTP QC/QA: YES

## 2021-12-18 PROCEDURE — 25000003 PHARM REV CODE 250: Mod: ER | Performed by: NURSE PRACTITIONER

## 2021-12-18 PROCEDURE — 87070 CULTURE OTHR SPECIMN AEROBIC: CPT | Performed by: NURSE PRACTITIONER

## 2021-12-18 PROCEDURE — 99284 EMERGENCY DEPT VISIT MOD MDM: CPT | Mod: ER

## 2021-12-18 PROCEDURE — 81025 URINE PREGNANCY TEST: CPT | Mod: ER | Performed by: EMERGENCY MEDICINE

## 2021-12-18 RX ORDER — MUPIROCIN 20 MG/G
OINTMENT TOPICAL 3 TIMES DAILY
Qty: 30 G | Refills: 0 | Status: SHIPPED | OUTPATIENT
Start: 2021-12-18 | End: 2022-06-01 | Stop reason: ALTCHOICE

## 2021-12-18 RX ORDER — IBUPROFEN 600 MG/1
600 TABLET ORAL
Status: COMPLETED | OUTPATIENT
Start: 2021-12-18 | End: 2021-12-18

## 2021-12-18 RX ORDER — IBUPROFEN 600 MG/1
600 TABLET ORAL EVERY 6 HOURS PRN
Qty: 20 TABLET | Refills: 0 | Status: SHIPPED | OUTPATIENT
Start: 2021-12-18

## 2021-12-18 RX ORDER — OXYCODONE AND ACETAMINOPHEN 5; 325 MG/1; MG/1
1 TABLET ORAL EVERY 6 HOURS PRN
Qty: 12 TABLET | Refills: 0 | Status: SHIPPED | OUTPATIENT
Start: 2021-12-18 | End: 2022-06-01 | Stop reason: ALTCHOICE

## 2021-12-18 RX ORDER — SULFAMETHOXAZOLE AND TRIMETHOPRIM 800; 160 MG/1; MG/1
1 TABLET ORAL 2 TIMES DAILY
Qty: 14 TABLET | Refills: 0 | Status: SHIPPED | OUTPATIENT
Start: 2021-12-18 | End: 2021-12-25

## 2021-12-18 RX ORDER — MUPIROCIN 20 MG/G
OINTMENT TOPICAL
Status: COMPLETED | OUTPATIENT
Start: 2021-12-18 | End: 2021-12-18

## 2021-12-18 RX ADMIN — MUPIROCIN: 20 OINTMENT TOPICAL at 10:12

## 2021-12-18 RX ADMIN — IBUPROFEN 600 MG: 600 TABLET ORAL at 10:12

## 2021-12-20 LAB — BACTERIA SPEC AEROBE CULT: NORMAL

## 2022-03-18 ENCOUNTER — HOSPITAL ENCOUNTER (OUTPATIENT)
Dept: RADIOLOGY | Facility: HOSPITAL | Age: 47
Discharge: HOME | End: 2022-03-18
Attending: OBSTETRICS & GYNECOLOGY
Payer: MEDICAID

## 2022-03-18 VITALS — HEIGHT: 66 IN | BODY MASS INDEX: 28.14 KG/M2 | WEIGHT: 175.06 LBS

## 2022-03-18 DIAGNOSIS — Z12.31 BREAST CANCER SCREENING BY MAMMOGRAM: ICD-10-CM

## 2022-03-18 PROCEDURE — 77067 MAMMO DIGITAL SCREENING BILAT WITH TOMO: ICD-10-PCS | Mod: 26,,, | Performed by: RADIOLOGY

## 2022-03-18 PROCEDURE — 77063 MAMMO DIGITAL SCREENING BILAT WITH TOMO: ICD-10-PCS | Mod: 26,,, | Performed by: RADIOLOGY

## 2022-03-18 PROCEDURE — 77067 SCR MAMMO BI INCL CAD: CPT | Mod: TC

## 2022-03-18 PROCEDURE — 77067 SCR MAMMO BI INCL CAD: CPT | Mod: 26,,, | Performed by: RADIOLOGY

## 2022-03-18 PROCEDURE — 77063 BREAST TOMOSYNTHESIS BI: CPT | Mod: 26,,, | Performed by: RADIOLOGY

## 2022-03-18 PROCEDURE — 77063 BREAST TOMOSYNTHESIS BI: CPT | Mod: TC

## 2022-03-24 ENCOUNTER — PATIENT OUTREACH (OUTPATIENT)
Dept: ADMINISTRATIVE | Facility: HOSPITAL | Age: 47
End: 2022-03-24
Payer: MEDICAID

## 2022-03-25 ENCOUNTER — PATIENT MESSAGE (OUTPATIENT)
Dept: OBSTETRICS AND GYNECOLOGY | Facility: CLINIC | Age: 47
End: 2022-03-25
Payer: MEDICAID

## 2022-03-25 DIAGNOSIS — I10 ESSENTIAL HYPERTENSION: ICD-10-CM

## 2022-03-25 RX ORDER — CHLORTHALIDONE 25 MG/1
25 TABLET ORAL DAILY
Qty: 90 TABLET | Refills: 0 | Status: SHIPPED | OUTPATIENT
Start: 2022-03-25 | End: 2022-06-24

## 2022-04-18 DIAGNOSIS — I10 ESSENTIAL HYPERTENSION: ICD-10-CM

## 2022-04-18 RX ORDER — VALSARTAN 320 MG/1
320 TABLET ORAL DAILY
Qty: 30 TABLET | Refills: 1 | Status: SHIPPED | OUTPATIENT
Start: 2022-04-18 | End: 2022-06-01 | Stop reason: SDUPTHER

## 2022-04-19 ENCOUNTER — HOSPITAL ENCOUNTER (EMERGENCY)
Facility: HOSPITAL | Age: 47
Discharge: HOME | End: 2022-04-19
Attending: EMERGENCY MEDICINE
Payer: MEDICAID

## 2022-04-19 VITALS
HEIGHT: 66 IN | OXYGEN SATURATION: 98 % | HEART RATE: 72 BPM | BODY MASS INDEX: 27.97 KG/M2 | SYSTOLIC BLOOD PRESSURE: 160 MMHG | DIASTOLIC BLOOD PRESSURE: 80 MMHG | TEMPERATURE: 99 F | WEIGHT: 174 LBS | RESPIRATION RATE: 16 BRPM

## 2022-04-19 DIAGNOSIS — N60.41 PERIDUCTAL MASTITIS OF RIGHT BREAST: ICD-10-CM

## 2022-04-19 DIAGNOSIS — N61.0 CELLULITIS OF RIGHT BREAST: Primary | ICD-10-CM

## 2022-04-19 LAB
ALBUMIN SERPL BCP-MCNC: 3.8 G/DL (ref 3.5–5.2)
ALP SERPL-CCNC: 21 U/L (ref 55–135)
ALT SERPL W/O P-5'-P-CCNC: 13 U/L (ref 10–44)
ANION GAP SERPL CALC-SCNC: 12 MMOL/L (ref 8–16)
AST SERPL-CCNC: 15 U/L (ref 10–40)
B-HCG UR QL: NEGATIVE
BACTERIA #/AREA URNS AUTO: ABNORMAL /HPF
BASOPHILS # BLD AUTO: 0.02 K/UL (ref 0–0.2)
BASOPHILS NFR BLD: 0.3 % (ref 0–1.9)
BILIRUB SERPL-MCNC: 1 MG/DL (ref 0.1–1)
BILIRUB UR QL STRIP: NEGATIVE
BUN SERPL-MCNC: 10 MG/DL (ref 6–20)
CALCIUM SERPL-MCNC: 9.5 MG/DL (ref 8.7–10.5)
CHLORIDE SERPL-SCNC: 103 MMOL/L (ref 95–110)
CLARITY UR REFRACT.AUTO: ABNORMAL
CO2 SERPL-SCNC: 25 MMOL/L (ref 23–29)
COLOR UR AUTO: YELLOW
CREAT SERPL-MCNC: 1.1 MG/DL (ref 0.5–1.4)
CTP QC/QA: YES
DIFFERENTIAL METHOD: ABNORMAL
EOSINOPHIL # BLD AUTO: 0.1 K/UL (ref 0–0.5)
EOSINOPHIL NFR BLD: 0.7 % (ref 0–8)
ERYTHROCYTE [DISTWIDTH] IN BLOOD BY AUTOMATED COUNT: 12.9 % (ref 11.5–14.5)
EST. GFR  (AFRICAN AMERICAN): >60 ML/MIN/1.73 M^2
EST. GFR  (NON AFRICAN AMERICAN): 59.9 ML/MIN/1.73 M^2
GLUCOSE SERPL-MCNC: 91 MG/DL (ref 70–110)
GLUCOSE UR QL STRIP: NEGATIVE
HCT VFR BLD AUTO: 38 % (ref 37–48.5)
HGB BLD-MCNC: 13.5 G/DL (ref 12–16)
HGB UR QL STRIP: NEGATIVE
IMM GRANULOCYTES # BLD AUTO: 0.03 K/UL (ref 0–0.04)
IMM GRANULOCYTES NFR BLD AUTO: 0.4 % (ref 0–0.5)
KETONES UR QL STRIP: NEGATIVE
LEUKOCYTE ESTERASE UR QL STRIP: NEGATIVE
LYMPHOCYTES # BLD AUTO: 1.2 K/UL (ref 1–4.8)
LYMPHOCYTES NFR BLD: 17.2 % (ref 18–48)
MCH RBC QN AUTO: 32.1 PG (ref 27–31)
MCHC RBC AUTO-ENTMCNC: 35.5 G/DL (ref 32–36)
MCV RBC AUTO: 91 FL (ref 82–98)
MICROSCOPIC COMMENT: ABNORMAL
MONOCYTES # BLD AUTO: 0.8 K/UL (ref 0.3–1)
MONOCYTES NFR BLD: 10.8 % (ref 4–15)
NEUTROPHILS # BLD AUTO: 5.1 K/UL (ref 1.8–7.7)
NEUTROPHILS NFR BLD: 70.6 % (ref 38–73)
NITRITE UR QL STRIP: NEGATIVE
NRBC BLD-RTO: 0 /100 WBC
PH UR STRIP: 6 [PH] (ref 5–8)
PLATELET # BLD AUTO: 243 K/UL (ref 150–450)
PMV BLD AUTO: 10.1 FL (ref 9.2–12.9)
POTASSIUM SERPL-SCNC: 2.9 MMOL/L (ref 3.5–5.1)
PROT SERPL-MCNC: 7.1 G/DL (ref 6–8.4)
PROT UR QL STRIP: NEGATIVE
RBC # BLD AUTO: 4.2 M/UL (ref 4–5.4)
RBC #/AREA URNS AUTO: 7 /HPF (ref 0–4)
SODIUM SERPL-SCNC: 140 MMOL/L (ref 136–145)
SP GR UR STRIP: 1.01 (ref 1–1.03)
SQUAMOUS #/AREA URNS AUTO: 1 /HPF
URN SPEC COLLECT METH UR: ABNORMAL
WBC # BLD AUTO: 7.21 K/UL (ref 3.9–12.7)
WBC #/AREA URNS AUTO: 5 /HPF (ref 0–5)

## 2022-04-19 PROCEDURE — 96374 THER/PROPH/DIAG INJ IV PUSH: CPT

## 2022-04-19 PROCEDURE — 25000003 PHARM REV CODE 250: Performed by: INTERNAL MEDICINE

## 2022-04-19 PROCEDURE — 81025 URINE PREGNANCY TEST: CPT | Performed by: INTERNAL MEDICINE

## 2022-04-19 PROCEDURE — 99284 EMERGENCY DEPT VISIT MOD MDM: CPT | Mod: ,,, | Performed by: EMERGENCY MEDICINE

## 2022-04-19 PROCEDURE — 85025 COMPLETE CBC W/AUTO DIFF WBC: CPT | Performed by: INTERNAL MEDICINE

## 2022-04-19 PROCEDURE — 81001 URINALYSIS AUTO W/SCOPE: CPT | Performed by: INTERNAL MEDICINE

## 2022-04-19 PROCEDURE — 99284 PR EMERGENCY DEPT VISIT,LEVEL IV: ICD-10-PCS | Mod: ,,, | Performed by: EMERGENCY MEDICINE

## 2022-04-19 PROCEDURE — 99285 EMERGENCY DEPT VISIT HI MDM: CPT | Mod: 25

## 2022-04-19 PROCEDURE — 63600175 PHARM REV CODE 636 W HCPCS: Performed by: INTERNAL MEDICINE

## 2022-04-19 PROCEDURE — 80053 COMPREHEN METABOLIC PANEL: CPT | Performed by: INTERNAL MEDICINE

## 2022-04-19 RX ORDER — SULFAMETHOXAZOLE AND TRIMETHOPRIM 800; 160 MG/1; MG/1
1 TABLET ORAL 2 TIMES DAILY
Qty: 14 TABLET | Refills: 0 | Status: SHIPPED | OUTPATIENT
Start: 2022-04-19 | End: 2022-04-26

## 2022-04-19 RX ORDER — HYDROCODONE BITARTRATE AND ACETAMINOPHEN 5; 325 MG/1; MG/1
1 TABLET ORAL EVERY 6 HOURS PRN
Qty: 4 TABLET | Refills: 0 | Status: SHIPPED | OUTPATIENT
Start: 2022-04-19 | End: 2022-04-26

## 2022-04-19 RX ORDER — POTASSIUM CHLORIDE 20 MEQ/1
40 TABLET, EXTENDED RELEASE ORAL
Status: COMPLETED | OUTPATIENT
Start: 2022-04-19 | End: 2022-04-19

## 2022-04-19 RX ORDER — ACETAMINOPHEN 500 MG
1000 TABLET ORAL
Status: COMPLETED | OUTPATIENT
Start: 2022-04-19 | End: 2022-04-19

## 2022-04-19 RX ORDER — KETOROLAC TROMETHAMINE 30 MG/ML
10 INJECTION, SOLUTION INTRAMUSCULAR; INTRAVENOUS
Status: COMPLETED | OUTPATIENT
Start: 2022-04-19 | End: 2022-04-19

## 2022-04-19 RX ADMIN — POTASSIUM CHLORIDE 40 MEQ: 1500 TABLET, EXTENDED RELEASE ORAL at 11:04

## 2022-04-19 RX ADMIN — ACETAMINOPHEN 1000 MG: 500 TABLET ORAL at 09:04

## 2022-04-19 RX ADMIN — KETOROLAC TROMETHAMINE 10 MG: 30 INJECTION, SOLUTION INTRAMUSCULAR at 09:04

## 2022-04-19 NOTE — DISCHARGE INSTRUCTIONS
If your pain is not controlled with ibuprofen, you may also take Norco (acetaminophen-hydrocodone).  - Take this medication only when needed for breakthrough pain.   - Do not take more than the prescribed amount to control your pain.  - Do not operate machinery, drive, exercise, perform caregiving, make important decisions or perform important tasks while taking Norco. It can make you drowsy or forgetful.  - Norco is addictive in as little as 3 days, so take only as needed.  - Norco can dangerously slow or stop your breathing if you take too much, or if you combine it with alcohol or sedating medicines (including Xanex, Ativan) or illegal drugs.   - Norco can make you constipated (hard to poop), so take fiber supplements and a stool softener while taking this medicine.   - This medication contains acetaminophen (Tylenol). Each pill has 325 mg of acetaminophen. Do not take more than 4,000 mg of acetaminophen within 24 hours as this may lead to liver damage.        Diagnosis:   1. Cellulitis of right breast    2. Periductal mastitis of right breast        Tests you had showed:   Labs Reviewed   CBC W/ AUTO DIFFERENTIAL - Abnormal; Notable for the following components:       Result Value    MCH 32.1 (*)     Lymph % 17.2 (*)     All other components within normal limits   COMPREHENSIVE METABOLIC PANEL - Abnormal; Notable for the following components:    Potassium 2.9 (*)     Alkaline Phosphatase 21 (*)     eGFR if non  59.9 (*)     All other components within normal limits   URINALYSIS, REFLEX TO URINE CULTURE - Abnormal; Notable for the following components:    Appearance, UA Cloudy (*)     All other components within normal limits    Narrative:     Specimen Source->Urine   URINALYSIS MICROSCOPIC - Abnormal; Notable for the following components:    RBC, UA 7 (*)     All other components within normal limits    Narrative:     Specimen Source->Urine   POCT URINE PREGNANCY      US Chest Mediastinum   Final  Result      Tubular hyperechoic structures within the right breast which may be seen with dilated ducts. No discrete abscess is visualized.      Slightly increased perfusion of the adjacent subcutaneous soft tissue which may be seen with cellulitis.      Electronically signed by resident: Angel Garvin   Date:    04/19/2022   Time:    10:09      Electronically signed by: Josh Mccain MD   Date:    04/19/2022   Time:    10:35          Treatments you received were:   Medications   ketorolac injection 9.999 mg (9.999 mg Intravenous Given 4/19/22 0932)   acetaminophen tablet 1,000 mg (1,000 mg Oral Given 4/19/22 0929)   potassium chloride SA CR tablet 40 mEq (40 mEq Oral Given 4/19/22 1118)       Home Care Instructions:  - Medications: Continue taking your home medications as prescribed    Follow-Up Plan:  - Follow-up with: Primary care doctor within 1-2  days  - Additional testing and/or evaluation will be directed by your primary doctor    Return to the Emergency Department for symptoms including but not limited to: worsening symptoms, severe back pain, shortness of breath or chest pain, vomiting with inability to hold down fluids, blood in vomit or poop, fevers greater than 100.4°F, passing out/fainting/unconsciousness, or other concerning symptoms.     Future Appointments   Date Time Provider Department Center   5/25/2022  9:05 AM LAB, [facility]   6/1/2022  9:00 AM Henrik Vazquez MD [facility]

## 2022-04-19 NOTE — SUBJECTIVE & OBJECTIVE
No current facility-administered medications on file prior to encounter.     Current Outpatient Medications on File Prior to Encounter   Medication Sig    chlorthalidone (HYGROTEN) 25 MG Tab Take 1 tablet (25 mg total) by mouth once daily. Need office visit for further refills    fluocinonide (LIDEX) 0.05 % external solution APPLY TO AFFECTED AREA ONCE A DAY    FLUoxetine 20 MG capsule Take 1 capsule (20 mg total) by mouth once daily.    hydrOXYzine (VISTARIL) 50 MG Cap Take 2 capsules (100 mg total) by mouth 2 (two) times daily as needed    ibuprofen (ADVIL,MOTRIN) 600 MG tablet Take 1 tablet (600 mg total) by mouth every 6 (six) hours as needed for Pain.    ketoconazole (NIZORAL) 2 % shampoo APPLY TO THE SCALP 2 TIMES WEEKLY    metoprolol succinate (TOPROL-XL) 100 MG 24 hr tablet Take 2 tablets (200 mg total) by mouth once daily.    multivitamin with minerals tablet Take 1 tablet by mouth once daily.    mupirocin (BACTROBAN) 2 % ointment Apply topically 3 (three) times daily.    oxyCODONE-acetaminophen (PERCOCET) 5-325 mg per tablet Take 1 tablet by mouth every 6 (six) hours as needed (severe pain).    tretinoin (RETIN-A) 0.025 % cream APPLY THIN LAYER TO FACE AT BEDTIME    valsartan (DIOVAN) 320 MG tablet Take 1 tablet (320 mg total) by mouth once daily.       Review of patient's allergies indicates:   Allergen Reactions    Ace inhibitors      Other reaction(s): cough       Past Medical History:   Diagnosis Date    Anxiety     Depression     History of acne     Hx of psychiatric care     Hyperlipidemia     Hypertension     Keloid cicatrix     Psychiatric problem     Psychosis     Sleep difficulties     Therapy      Past Surgical History:   Procedure Laterality Date     SECTION, CLASSIC      x 2    Laparoscopic bilateral tubal ligation.      TONSILLECTOMY       Family History       Problem Relation (Age of Onset)    Aortic aneurysm Father    Breast cancer Sister (52), Maternal Cousin, Maternal Cousin,  Maternal Cousin    Cancer Cousin (50)    Depression Sister    Heart disease Father (46)    Hypertension Father, Mother    Schizophrenia Maternal Uncle, Cousin    Stroke Father (46)          Tobacco Use    Smoking status: Never Smoker    Smokeless tobacco: Never Used   Substance and Sexual Activity    Alcohol use: Yes     Comment: Social; rare use    Drug use: Yes     Types: Marijuana     Comment: uses infrequently    Sexual activity: Yes     Partners: Male     Birth control/protection: See Surgical Hx     Review of Systems   Constitutional:  Negative for activity change, chills, fatigue, fever and unexpected weight change.   HENT:  Negative for congestion, sinus pressure, sinus pain and sore throat.    Eyes:  Negative for visual disturbance.   Respiratory:  Negative for cough, chest tightness and shortness of breath.    Cardiovascular:  Negative for chest pain and palpitations.   Gastrointestinal:  Negative for abdominal pain, constipation, diarrhea, nausea and vomiting.   Genitourinary:  Negative for difficulty urinating, flank pain and urgency.   Musculoskeletal:  Negative for arthralgias, back pain and neck pain.   Skin:         Right breast swelling and redness   Neurological:  Negative for dizziness, weakness, light-headedness and headaches.   Objective:     Vital Signs (Most Recent):  Temp: 98.7 °F (37.1 °C) (04/19/22 0745)  Pulse: 75 (04/19/22 1030)  Resp: 16 (04/19/22 1030)  BP: (!) 179/98 (04/19/22 1030)  SpO2: 100 % (04/19/22 1030)   Vital Signs (24h Range):  Temp:  [98.7 °F (37.1 °C)] 98.7 °F (37.1 °C)  Pulse:  [75-97] 75  Resp:  [16-18] 16  SpO2:  [99 %-100 %] 100 %  BP: (175-199)/() 179/98     Weight: 78.9 kg (174 lb)  Body mass index is 28.08 kg/m².    Physical Exam  Constitutional:       General: She is not in acute distress.     Appearance: Normal appearance.   HENT:      Head: Normocephalic.      Mouth/Throat:      Mouth: Mucous membranes are moist.   Eyes:      Extraocular Movements:  Extraocular movements intact.      Conjunctiva/sclera: Conjunctivae normal.      Pupils: Pupils are equal, round, and reactive to light.   Cardiovascular:      Rate and Rhythm: Normal rate and regular rhythm.   Pulmonary:      Effort: Pulmonary effort is normal. No respiratory distress.   Chest:   Breasts:      Right: Swelling and tenderness present.      Left: Normal.        Comments: Right breast with erythema extending along the upper outer breast from the areola. No skin thickening or fluctuance. Tender to palpation. No drainable collections.   Abdominal:      General: There is no distension.      Palpations: Abdomen is soft.   Neurological:      General: No focal deficit present.      Mental Status: She is alert.       Significant Labs:  I have reviewed all pertinent lab results within the past 24 hours.  CBC:   Recent Labs   Lab 04/19/22  0910   WBC 7.21   RBC 4.20   HGB 13.5   HCT 38.0      MCV 91   MCH 32.1*   MCHC 35.5     CMP:   Recent Labs   Lab 04/19/22  0910   GLU 91   CALCIUM 9.5   ALBUMIN 3.8   PROT 7.1      K 2.9*   CO2 25      BUN 10   CREATININE 1.1   ALKPHOS 21*   ALT 13   AST 15   BILITOT 1.0       Significant Diagnostics:  I have reviewed all pertinent imaging results/findings within the past 24 hours.    U/S 4/19:  Impression:     Tubular hyperechoic structures within the right breast which may be seen with dilated ducts. No discrete abscess is visualized.     Slightly increased perfusion of the adjacent subcutaneous soft tissue which may be seen with cellulitis.

## 2022-04-19 NOTE — ED PROVIDER NOTES
Encounter date: 8:40 AM 04/19/2022    Source of History   Patient    Chief Complaint   Pt presents with:   Breast Pain (R breast)      History Of Present Illness   Navjot Segovia is a 47 y.o. female with history of hypertension hyperlipidemia who presents to the ED with right breast pain x5 days.  She states that she has noted some swelling of the right breast in a hard ache like structure.  She states that it has been getting bigger over the last few days.  She states that there is increased warmth and has been painful.  She has tried Motrin and warm Tylenol without subsequent in her pain.  She states that the pain now is starting to radiate to her right arm.  She denies any shortness of breath.  She states that this pain does not worsen with exercise, exertion, deep breathing.  She denies any fevers or chills.  She states that she has had no overlying skin changes on the breast yet notes some redness.  No new medications.  She is not breast feeding.  She denies any trauma.  She states that she has had multiple abscesses which have required I and D on the left breast but no abscess in the right breast.  Of note for the last 2 days she has taken a total of 4 doses of Bactrim.    Denies:  Shortness of breath, fever, chills, cough  This is the extent to the patients complaints today here in the emergency department.    Review Of Systems   As per HPI and below:  Positive for:  Right breast pain and swelling  Constitutional: No fever.   HEENT: No voice change.   Eyes:  No visual disturbances. No eye pain.   Respiratory:  No shortness of breath. No wheezing. No cough.   Cardio:  No chest pain. No leg swelling. No palpitations.   GI:  No abdominal pain. No nausea or vomiting. No diarrhea.   :  No blood in urine. No difficulty urinating.   MSK:  No back pain. No neck pain.   Skin: No rash.   Neurologic: No headache. No dizziness.       Review of patient's allergies indicates:   Allergen Reactions    Ace inhibitors       Other reaction(s): cough       No current facility-administered medications on file prior to encounter.     Current Outpatient Medications on File Prior to Encounter   Medication Sig Dispense Refill    chlorthalidone (HYGROTEN) 25 MG Tab Take 1 tablet (25 mg total) by mouth once daily. Need office visit for further refills 90 tablet 0    fluocinonide (LIDEX) 0.05 % external solution APPLY TO AFFECTED AREA ONCE A DAY  4    FLUoxetine 20 MG capsule Take 1 capsule (20 mg total) by mouth once daily. 90 capsule 3    hydrOXYzine (VISTARIL) 50 MG Cap Take 2 capsules (100 mg total) by mouth 2 (two) times daily as needed 120 capsule 0    ibuprofen (ADVIL,MOTRIN) 600 MG tablet Take 1 tablet (600 mg total) by mouth every 6 (six) hours as needed for Pain. 20 tablet 0    ketoconazole (NIZORAL) 2 % shampoo APPLY TO THE SCALP 2 TIMES WEEKLY  2    metoprolol succinate (TOPROL-XL) 100 MG 24 hr tablet Take 2 tablets (200 mg total) by mouth once daily. 180 tablet 3    multivitamin with minerals tablet Take 1 tablet by mouth once daily.      mupirocin (BACTROBAN) 2 % ointment Apply topically 3 (three) times daily. 30 g 0    oxyCODONE-acetaminophen (PERCOCET) 5-325 mg per tablet Take 1 tablet by mouth every 6 (six) hours as needed (severe pain). 12 tablet 0    tretinoin (RETIN-A) 0.025 % cream APPLY THIN LAYER TO FACE AT BEDTIME  3    valsartan (DIOVAN) 320 MG tablet Take 1 tablet (320 mg total) by mouth once daily. 30 tablet 1       Past History   As per HPI and below:  Past Medical History:   Diagnosis Date    Anxiety     Depression     History of acne     Hx of psychiatric care     Hyperlipidemia     Hypertension     Keloid cicatrix     Psychiatric problem     Psychosis     Sleep difficulties     Therapy      Past Surgical History:   Procedure Laterality Date     SECTION, CLASSIC      x 2    Laparoscopic bilateral tubal ligation.      TONSILLECTOMY         Social History     Socioeconomic History     Marital status: Significant Other    Number of children: 3   Occupational History    Occupation:    Tobacco Use    Smoking status: Never Smoker    Smokeless tobacco: Never Used   Substance and Sexual Activity    Alcohol use: Yes     Comment: Social; rare use    Drug use: Yes     Types: Marijuana     Comment: uses infrequently    Sexual activity: Yes     Partners: Male     Birth control/protection: See Surgical Hx   Other Topics Concern    Patient feels they ought to cut down on drinking/drug use No    Patient annoyed by others criticizing their drinking/drug use No    Patient has felt bad or guilty about drinking/drug use No    Patient has had a drink/used drugs as an eye opener in the AM No   Social History Narrative    Engaged to [employer].    Has 3 children.    Works at [employer] in [email or web address].       Family History   Problem Relation Age of Onset    Hypertension Father     Heart disease Father 46    Aortic aneurysm Father         abdominal    Stroke Father 46    Hypertension Mother     Depression Sister     Breast cancer Sister 52    Schizophrenia Maternal Uncle     Schizophrenia Cousin     Cancer Cousin 50        cervical vs. ovarian    Breast cancer Maternal Cousin     Breast cancer Maternal Cousin     Breast cancer Maternal Cousin     Ovarian cancer Neg Hx        Physical Exam     Vitals:    04/19/22 0930 04/19/22 1030 04/19/22 1203 04/19/22 1327   BP: (!) 175/100 (!) 179/98 (!) 164/87 (!) 160/80   BP Location: Right arm Right arm  Right arm   Patient Position: Sitting Sitting  Sitting   Pulse: 83 75 74 72   Resp: 16 16 16 16   Temp:       TempSrc:       SpO2: 99% 100% 100% 98%   Weight:       Height:         Physical Exam:   Nursing note and vitals reviewed.  Appearance:  Well-appearing, nontoxic adult female in no acute respiratory distress.  Making purposeful movements.  Speaking in full sentences.  Skin: No rashes seen.  Good turgor.  No abrasions.  No  ecchymoses.  Breast exam performed with nursing chaperone shows normal left breast with no tenderness.  Right breast shows erythema with a circular 3 by 4 cm mass.  Overlying erythema and increased warmth.  No purulent drainage.  No dimpling or retractions of the breast.  No express material from right breast.  Eyes: No conjunctival injection. EOMI, PERRL.  Head: NC/AT  ENT: Oropharynx clear.    Chest: CTAB. No increased work of breathing, bilateral chest rise.  Cardiovascular: Regular rate and rhythm.  Normal equal bilateral radial pulses.  Abdomen: Soft.  Not distended.  Nontender.  No guarding.  No rebound. No Masses  Musculoskeletal: Good range of motion all joints.  No deformities.  Neck supple, full range of motion, no obvious deformity.  Full range of motion of the right shoulder.  No tenderness to palpation of right shoulder.  Neurologic: Moves all extremities.  Normal sensation.  No facial droop.  Normal speech.    Mental Status:  Alert and oriented x 3.  Appropriate, conversant.      Results and Medications    Procedures    Labs Reviewed   CBC W/ AUTO DIFFERENTIAL - Abnormal; Notable for the following components:       Result Value    MCH 32.1 (*)     Lymph % 17.2 (*)     All other components within normal limits   COMPREHENSIVE METABOLIC PANEL - Abnormal; Notable for the following components:    Potassium 2.9 (*)     Alkaline Phosphatase 21 (*)     eGFR if non  59.9 (*)     All other components within normal limits   URINALYSIS, REFLEX TO URINE CULTURE - Abnormal; Notable for the following components:    Appearance, UA Cloudy (*)     All other components within normal limits    Narrative:     Specimen Source->Urine   URINALYSIS MICROSCOPIC - Abnormal; Notable for the following components:    RBC, UA 7 (*)     All other components within normal limits    Narrative:     Specimen Source->Urine   POCT URINE PREGNANCY       Imaging Results          US Chest Mediastinum (Final result)  Result  "time 04/19/22 10:35:45   Procedure changed from US Soft Tissue Chest_Upper Back     Final result by Josh Mccain MD (04/19/22 10:35:45)                 Impression:      Tubular hyperechoic structures within the right breast which may be seen with dilated ducts. No discrete abscess is visualized.    Slightly increased perfusion of the adjacent subcutaneous soft tissue which may be seen with cellulitis.    Electronically signed by resident: Angel Garvin  Date:    04/19/2022  Time:    10:09    Electronically signed by: Josh Mccain MD  Date:    04/19/2022  Time:    10:35             Narrative:    EXAMINATION:  US CHEST MEDIASTINUM    CLINICAL HISTORY:  "R. breast pain and swelling;"    COMPARISON:  Mammogram 03/18/2022, 03/16/2020    TECHNIQUE:  Limited grayscale and color Doppler evaluation of the right breast in the area of concern.  Images of the left breast were obtained for comparison.    FINDINGS:  Within the right breast, there is a dilated tubular structures with some internal echoes however lacking internal color Doppler flow.    There is some scant adjacent vascular flow.                                    Medications   ketorolac injection 9.999 mg (9.999 mg Intravenous Given 4/19/22 0932)   acetaminophen tablet 1,000 mg (1,000 mg Oral Given 4/19/22 0929)   potassium chloride SA CR tablet 40 mEq (40 mEq Oral Given 4/19/22 1118)       MDM, Impression and Plan   Previous Records:  I decided to obtain old medical records which showed:  Patient had a a abscess of left breast which was I&D eating cultured on 12/18/2021 and showed no growth on anaerobic culture.  She also had a mammogram on 11/22/2021 for routine screening which was negative for malignancy.  History of familial breast cancer.    Initial Assessment:   Urgent evaluation of 47 y.o. female with history as above who presents the ED with chief complaint of right breast swelling and pain x5 days.  On arrival to the ED she is noted to be " afebrile, hemodynamically stable and in no acute respiratory distress.  She is noted to be hypertensive to 199/97 which subsequently changed to 175/100 with analgesics.  Bedside ultrasound was performed which shows a 2 x 3 cm hyper below echo which lesion in the right breast lateral to the nipple with no increased Doppler flow.  Will send patient for formal ultrasound.  Differential Diagnosis:   -breast cyst  -breast abscess  -breast cancer  -cellulitis  -thought was given to cardiac etiology yet this is unlikely based off physical exam/ history  -thought was given to pulmonary versus cardiac etiology yet this is unlikely based off for physical exam and history  Clinical Tests:   Lab Tests: Ordered and Reviewed  Radiological Study: Ordered and Reviewed  Medical Tests: Ordered and Reviewed    ED Management:    Vitals remained stable while patient was in the emergency department and there was noted improvement in her blood pressure prior to discharge.  She noted subsequent reduction in pain with Tylenol and Toradol.  Her potassium was noted to be low and was replaced orally.  Creatinine near baseline.  Urinalysis no signs of UTI.  Urine pregnancy negative.  No leukocytosis and hemoglobin near baseline.  Ultrasound of the right breast was performed and no discrete abscess was visualized yet there was signs pointing towards cellulitis.  I called and discussed the case with General surgery who agreed to see the patient and stated that they believe that her pain and swelling was due to maco ductal mastoiditis without any abscess or fluid collection.  They recommended no surgical intervention or IND.  They recommended the patient be discharged on p.o. antibiotics and to follow-up in breast clinic in 1 week.  Patient was offered I and D in the ED yet declined.  She was agreeable with being discharged with p.o. Bactrim and Norco.  Strict return precautions were discussed.  She voiced verbal understanding agreement the plan.   Patient deemed stable for discharge.  Please see ED course for discussion of labs.     I called and discussed the case with:  General surgery         Final diagnoses:  [N61.0] Cellulitis of right breast (Primary)  [N60.41] Periductal mastitis of right breast          ED Disposition Condition    Discharge Stable        ED Prescriptions     Medication Sig Dispense Start Date End Date Auth. Provider    sulfamethoxazole-trimethoprim 800-160mg (BACTRIM DS) 800-160 mg Tab Take 1 tablet by mouth 2 (two) times daily. for 7 days 14 tablet 4/19/2022 4/26/2022 Fracisco Gutiérrez MD    HYDROcodone-acetaminophen (NORCO) 5-325 mg per tablet Take 1 tablet by mouth every 6 (six) hours as needed for Pain. 4 tablet 4/19/2022 4/26/2022 Fracisco Gutiérrez MD        Follow-up Information     Follow up With Specialties Details Why Contact Info    Jonathan Gtz MD General Surgery, Surgery Follow up in 1 week(s)  [address] [facility] [address]  [phone]      [facility] - Emergency Dept Emergency Medicine  If symptoms worsen [address] [facility] [id number] [phone]          ED Course as of 04/19/22 1513   Tue Apr 19, 2022   0851 She started taking bactrium that she has at home for the last two days. She has used ibprofen for pain and hot towels without much relief.  [HM]   [address] general surgery who states they will see the pt [HM]      ED Course User Index  [HM] MD Fracisco Jacobs MD   Emergency Resident      Fracisco Gutiérrez MD  Resident  04/19/22 1513

## 2022-04-19 NOTE — CONSULTS
[facility] - Emergency Dept  General Surgery  Consult Note    Patient Name: Navjot Segovia  MRN: [id number]  Code Status: Prior  Admission Date: 4/19/2022  Hospital Length of Stay: 0 days  Attending Physician: Linda Bell, *  Primary Care Provider: No primary care provider on file.    Patient information was obtained from patient and ER records.     Inpatient consult to General surgery  Consult performed by: Susy Casillas MD  Consult ordered by: Fracisco Gutiérrez MD        Subjective:     Principal Problem: <principal problem not specified>    History of Present Illness: Patient is a 47 y.o. female with history of HTN and HLD who General Surgery was consulted for concern of right breast abscess. Patient states that she started having right breast pain on 4/16 which worsened into the next day. She describes the pain as radiating from the nipple and towards her axilla. She states that her breast became swollen and red so she started taking some of her son's Bactrim. She took 4 doses total. She denies any fever, chills, nausea, or vomiting. She denies any palpable masses. Patient had a screening mammogram performed last month that was negative for any abnormalities. She states she has had left breast abscesses that have required I&Ds in the past, but they were located more medial and looked more 'cyst' like. She denies any prior infections of her right breast or any breast surgeries. She breast fed her children (youngest child 16 y.o.) and denies any complications during that time. No history of breast trauma. She still gets her monthly menses. Has history of breast cancer in her sister (diagnosed in her late 50s) and a few cousins on her mother's side. She denies smoking history. Not on any blood thinners.     Bedside US performed in the ED revealed a possible fluid collection so patient was sent for formal US which showed findings consistent with dilated ducts. No abcess seen. WBC wnl on labs.         No current  facility-administered medications on file prior to encounter.     Current Outpatient Medications on File Prior to Encounter   Medication Sig    chlorthalidone (HYGROTEN) 25 MG Tab Take 1 tablet (25 mg total) by mouth once daily. Need office visit for further refills    fluocinonide (LIDEX) 0.05 % external solution APPLY TO AFFECTED AREA ONCE A DAY    FLUoxetine 20 MG capsule Take 1 capsule (20 mg total) by mouth once daily.    hydrOXYzine (VISTARIL) 50 MG Cap Take 2 capsules (100 mg total) by mouth 2 (two) times daily as needed    ibuprofen (ADVIL,MOTRIN) 600 MG tablet Take 1 tablet (600 mg total) by mouth every 6 (six) hours as needed for Pain.    ketoconazole (NIZORAL) 2 % shampoo APPLY TO THE SCALP 2 TIMES WEEKLY    metoprolol succinate (TOPROL-XL) 100 MG 24 hr tablet Take 2 tablets (200 mg total) by mouth once daily.    multivitamin with minerals tablet Take 1 tablet by mouth once daily.    mupirocin (BACTROBAN) 2 % ointment Apply topically 3 (three) times daily.    oxyCODONE-acetaminophen (PERCOCET) 5-325 mg per tablet Take 1 tablet by mouth every 6 (six) hours as needed (severe pain).    tretinoin (RETIN-A) 0.025 % cream APPLY THIN LAYER TO FACE AT BEDTIME    valsartan (DIOVAN) 320 MG tablet Take 1 tablet (320 mg total) by mouth once daily.       Review of patient's allergies indicates:   Allergen Reactions    Ace inhibitors      Other reaction(s): cough       Past Medical History:   Diagnosis Date    Anxiety     Depression     History of acne     Hx of psychiatric care     Hyperlipidemia     Hypertension     Keloid cicatrix     Psychiatric problem     Psychosis     Sleep difficulties     Therapy      Past Surgical History:   Procedure Laterality Date     SECTION, CLASSIC      x 2    Laparoscopic bilateral tubal ligation.      TONSILLECTOMY       Family History       Problem Relation (Age of Onset)    Aortic aneurysm Father    Breast cancer Sister (52), Maternal Cousin,  Maternal Cousin, Maternal Cousin    Cancer Cousin (50)    Depression Sister    Heart disease Father (46)    Hypertension Father, Mother    Schizophrenia Maternal Uncle, Cousin    Stroke Father (46)          Tobacco Use    Smoking status: Never Smoker    Smokeless tobacco: Never Used   Substance and Sexual Activity    Alcohol use: Yes     Comment: Social; rare use    Drug use: Yes     Types: Marijuana     Comment: uses infrequently    Sexual activity: Yes     Partners: Male     Birth control/protection: See Surgical Hx     Review of Systems   Constitutional:  Negative for activity change, chills, fatigue, fever and unexpected weight change.   HENT:  Negative for congestion, sinus pressure, sinus pain and sore throat.    Eyes:  Negative for visual disturbance.   Respiratory:  Negative for cough, chest tightness and shortness of breath.    Cardiovascular:  Negative for chest pain and palpitations.   Gastrointestinal:  Negative for abdominal pain, constipation, diarrhea, nausea and vomiting.   Genitourinary:  Negative for difficulty urinating, flank pain and urgency.   Musculoskeletal:  Negative for arthralgias, back pain and neck pain.   Skin:         Right breast swelling and redness   Neurological:  Negative for dizziness, weakness, light-headedness and headaches.   Objective:     Vital Signs (Most Recent):  Temp: 98.7 °F (37.1 °C) (04/19/22 0745)  Pulse: 75 (04/19/22 1030)  Resp: 16 (04/19/22 1030)  BP: (!) 179/98 (04/19/22 1030)  SpO2: 100 % (04/19/22 1030)   Vital Signs (24h Range):  Temp:  [98.7 °F (37.1 °C)] 98.7 °F (37.1 °C)  Pulse:  [75-97] 75  Resp:  [16-18] 16  SpO2:  [99 %-100 %] 100 %  BP: (175-199)/() 179/98     Weight: 78.9 kg (174 lb)  Body mass index is 28.08 kg/m².    Physical Exam  Constitutional:       General: She is not in acute distress.     Appearance: Normal appearance.   HENT:      Head: Normocephalic.      Mouth/Throat:      Mouth: Mucous membranes are moist.   Eyes:       Extraocular Movements: Extraocular movements intact.      Conjunctiva/sclera: Conjunctivae normal.      Pupils: Pupils are equal, round, and reactive to light.   Cardiovascular:      Rate and Rhythm: Normal rate and regular rhythm.   Pulmonary:      Effort: Pulmonary effort is normal. No respiratory distress.   Chest:   Breasts:      Right: Swelling and tenderness present.      Left: Normal.        Comments: Right breast with erythema extending along the upper outer breast from the areola. No skin thickening or fluctuance. Tender to palpation. No drainable collections.   Abdominal:      General: There is no distension.      Palpations: Abdomen is soft.   Neurological:      General: No focal deficit present.      Mental Status: She is alert.       Significant Labs:  I have reviewed all pertinent lab results within the past 24 hours.  CBC:   Recent Labs   Lab 04/19/22  0910   WBC 7.21   RBC 4.20   HGB 13.5   HCT 38.0      MCV 91   MCH 32.1*   MCHC 35.5     CMP:   Recent Labs   Lab 04/19/22  0910   GLU 91   CALCIUM 9.5   ALBUMIN 3.8   PROT 7.1      K 2.9*   CO2 25      BUN 10   CREATININE 1.1   ALKPHOS 21*   ALT 13   AST 15   BILITOT 1.0       Significant Diagnostics:  I have reviewed all pertinent imaging results/findings within the past 24 hours.    U/S 4/19:  Impression:     Tubular hyperechoic structures within the right breast which may be seen with dilated ducts. No discrete abscess is visualized.     Slightly increased perfusion of the adjacent subcutaneous soft tissue which may be seen with cellulitis.        Assessment/Plan:     Periductal mastitis of right breast  47 y.o. female with history of HTN and HLD who General Surgery was consulted for concern of right breast abscess. Imaging and physical exam consistent with periductal mastitis without any associated abscess or fluid collection.     - No surgical intervention or drainage/aspiration needed at this time.   - Recommend a course of PO  antibiotics. Would continue with Bactrim as this was already started by the patient. Recommend 5-7 day course.   - Will have patient follow up with Dr. Gtz in breast clinic in 1 week.         VTE Risk Mitigation (From admission, onward)    None          Thank you for your consult. Please call with any questions.     Susy Casillas MD  General Surgery  [facility] - Emergency Dept

## 2022-04-19 NOTE — ED TRIAGE NOTES
Patient reports to the ED today with reports of R breast pain onset Saturday. Redness noted to R breast. Patient states she took bactrim that she had at home.     Patient identifiers verified and correct for Real arnel  LOC: The patient is awake, alert and aware of environment with an appropriate affect, the patient is oriented x 3 and speaking appropriately.   APPEARANCE: Patient appears comfortable and in no acute distress, patient is clean and well groomed.  SKIN: The skin is warm and dry, color consistent with ethnicity, patient has normal skin turgor and moist mucus membranes, skin intact, no breakdown or bruising noted. R breast pain Redness noted  MUSCULOSKELETAL: Patient moving all extremities spontaneously, no swelling noted.   RESPIRATORY: Airway is open and patent, respirations are spontaneous, patient has a normal effort and rate, no accessory muscle use noted, O2 sats noted at 100% on room air.  CARDIAC: Denies chest pain.   GASTRO: Soft and non tender to palpation, no distention noted, normoactive bowel sounds present in all four quadrants. Pt states bowel movements have been regular.  : Pt denies any pain or frequency with urination.  NEURO: Pt opens eyes spontaneously, behavior appropriate to situation, follows commands, facial expression symmetrical, bilateral hand grasp equal and even, purposeful motor response noted, normal sensation in all extremities when touched with a finger.

## 2022-04-19 NOTE — ASSESSMENT & PLAN NOTE
47 y.o. female with history of HTN and HLD who General Surgery was consulted for concern of right breast abscess. Imaging and physical exam consistent with periductal mastitis without any associated abscess or fluid collection.     - No surgical intervention or drainage/aspiration needed at this time.   - Recommend a course of PO antibiotics. Would continue with Bactrim as this was already started by the patient. Recommend 5-7 day course.   - Will have patient follow up with Dr. Gtz in breast clinic in 1 week.

## 2022-04-19 NOTE — HPI
Patient is a 47 y.o. female with history of HTN and HLD who General Surgery was consulted for concern of right breast abscess. Patient states that she started having right breast pain on 4/16 which worsened into the next day. She describes the pain as radiating from the nipple and towards her axilla. She states that her breast became swollen and red so she started taking some of her son's Bactrim. She took 4 doses total. She denies any fever, chills, nausea, or vomiting. She denies any palpable masses. Patient had a screening mammogram performed last month that was negative for any abnormalities. She states she has had left breast abscesses that have required I&Ds in the past, but they were located more medial and looked more 'cyst' like. She denies any prior infections of her right breast or any breast surgeries. She breast fed her children (youngest child 16 y.o.) and denies any complications during that time. No history of breast trauma. She still gets her monthly menses. Has history of breast cancer in her sister (diagnosed in her late 50s) and a few cousins on her mother's side. She denies smoking history. Not on any blood thinners.     Bedside US performed in the ED revealed a possible fluid collection so patient was sent for formal US which showed findings consistent with dilated ducts. No abcess seen. WBC wnl on labs.

## 2022-04-28 ENCOUNTER — OFFICE VISIT (OUTPATIENT)
Dept: SURGERY | Facility: CLINIC | Age: 47
End: 2022-04-28
Payer: MEDICAID

## 2022-04-28 VITALS
HEIGHT: 66 IN | OXYGEN SATURATION: 100 % | SYSTOLIC BLOOD PRESSURE: 165 MMHG | WEIGHT: 174.94 LBS | HEART RATE: 54 BPM | DIASTOLIC BLOOD PRESSURE: 90 MMHG | BODY MASS INDEX: 28.11 KG/M2

## 2022-04-28 DIAGNOSIS — N61.0 CELLULITIS OF RIGHT BREAST: ICD-10-CM

## 2022-04-28 PROCEDURE — 3080F DIAST BP >= 90 MM HG: CPT | Mod: CPTII,,, | Performed by: SURGERY

## 2022-04-28 PROCEDURE — 99204 OFFICE O/P NEW MOD 45 MIN: CPT | Mod: S$PBB,,, | Performed by: SURGERY

## 2022-04-28 PROCEDURE — 1159F PR MEDICATION LIST DOCUMENTED IN MEDICAL RECORD: ICD-10-PCS | Mod: CPTII,,, | Performed by: SURGERY

## 2022-04-28 PROCEDURE — 3077F SYST BP >= 140 MM HG: CPT | Mod: CPTII,,, | Performed by: SURGERY

## 2022-04-28 PROCEDURE — 99214 OFFICE O/P EST MOD 30 MIN: CPT | Mod: PBBFAC | Performed by: SURGERY

## 2022-04-28 PROCEDURE — 99999 PR PBB SHADOW E&M-EST. PATIENT-LVL IV: ICD-10-PCS | Mod: PBBFAC,,, | Performed by: SURGERY

## 2022-04-28 PROCEDURE — 4010F ACE/ARB THERAPY RXD/TAKEN: CPT | Mod: CPTII,,, | Performed by: SURGERY

## 2022-04-28 PROCEDURE — 1160F PR REVIEW ALL MEDS BY PRESCRIBER/CLIN PHARMACIST DOCUMENTED: ICD-10-PCS | Mod: CPTII,,, | Performed by: SURGERY

## 2022-04-28 PROCEDURE — 99204 PR OFFICE/OUTPT VISIT, NEW, LEVL IV, 45-59 MIN: ICD-10-PCS | Mod: S$PBB,,, | Performed by: SURGERY

## 2022-04-28 PROCEDURE — 1160F RVW MEDS BY RX/DR IN RCRD: CPT | Mod: CPTII,,, | Performed by: SURGERY

## 2022-04-28 PROCEDURE — 1159F MED LIST DOCD IN RCRD: CPT | Mod: CPTII,,, | Performed by: SURGERY

## 2022-04-28 PROCEDURE — 4010F PR ACE/ARB THEARPY RXD/TAKEN: ICD-10-PCS | Mod: CPTII,,, | Performed by: SURGERY

## 2022-04-28 PROCEDURE — 3080F PR MOST RECENT DIASTOLIC BLOOD PRESSURE >= 90 MM HG: ICD-10-PCS | Mod: CPTII,,, | Performed by: SURGERY

## 2022-04-28 PROCEDURE — 3008F PR BODY MASS INDEX (BMI) DOCUMENTED: ICD-10-PCS | Mod: CPTII,,, | Performed by: SURGERY

## 2022-04-28 PROCEDURE — 3077F PR MOST RECENT SYSTOLIC BLOOD PRESSURE >= 140 MM HG: ICD-10-PCS | Mod: CPTII,,, | Performed by: SURGERY

## 2022-04-28 PROCEDURE — 99999 PR PBB SHADOW E&M-EST. PATIENT-LVL IV: CPT | Mod: PBBFAC,,, | Performed by: SURGERY

## 2022-04-28 PROCEDURE — 3008F BODY MASS INDEX DOCD: CPT | Mod: CPTII,,, | Performed by: SURGERY

## 2022-04-28 NOTE — PROGRESS NOTES
GENERAL SURGERY CLINIC NOTE    HPI:  Navjot Segovia is a 47 y.o. female with history of HTN and HLD who was seen in the ED by General Surgery for concern of right breast abscess. Patient states that she started having right breast pain on 4/16 which worsened into the next day. She described the pain as radiating from the nipple and towards her axilla. She states that her breast became swollen and red so she started taking some of her son's Bactrim. She took 4 doses total. She denies any fever, chills, nausea, or vomiting. She denies any palpable masses. Patient had a screening mammogram performed last month that was negative for any abnormalities. She states she has had left breast abscesses that have required I&Ds in the past, but they were located more medial and looked more 'cyst' like. She denies any prior infections of her right breast or any breast surgeries. She breast fed her children (youngest child 16 y.o.) and denies any complications during that time. No history of breast trauma. She still gets her monthly menses. Has history of breast cancer in her sister (diagnosed in her late 50s) and a few cousins on her mother's side. She denies smoking history. Not on any blood thinners. Bedside US performed in the ED revealed a possible fluid collection so patient was sent for formal US which showed findings consistent with dilated ducts. No abcess seen. WBC wnl on labs.     Interval history:  Since ED, patient reports improvement in breast swelling and redness. Still can feel a lump deep in her right breast that is slightly tender to the touch. She denies fever or chills. Has two more days of bactrim. States that the lump has seemed to get slightly smaller but then has remained the same for the last couple of days. No nipple discharge.       ROS:   Gen: No F/C, unintentional weight loss, night sweats, fatigue  Cardio: No chest pain, palpitations, syncope  Resp: No shortness of breath, cough, wheeze  GI: No  abdominal pain, N/V, change in bowel habits, change in stool caliber or color, bleeding per rectum  : No dysuria, hematuria, frequency  Breast: right breast lump, tender    Past Medical History:   Diagnosis Date    Anxiety     Depression     History of acne     Hx of psychiatric care     Hyperlipidemia     Hypertension     Keloid cicatrix     Psychiatric problem     Psychosis     Sleep difficulties     Therapy      Past Surgical History:   Procedure Laterality Date     SECTION, CLASSIC      x 2    Laparoscopic bilateral tubal ligation.      TONSILLECTOMY       Social History     Socioeconomic History    Marital status: Significant Other    Number of children: 3   Occupational History    Occupation:    Tobacco Use    Smoking status: Never Smoker    Smokeless tobacco: Never Used   Substance and Sexual Activity    Alcohol use: Yes     Comment: Social; rare use    Drug use: Yes     Types: Marijuana     Comment: uses infrequently    Sexual activity: Yes     Partners: Male     Birth control/protection: See Surgical Hx   Other Topics Concern    Patient feels they ought to cut down on drinking/drug use No    Patient annoyed by others criticizing their drinking/drug use No    Patient has felt bad or guilty about drinking/drug use No    Patient has had a drink/used drugs as an eye opener in the AM No   Social History Narrative    Engaged to [id number] outdoors.    Has 3 children.    Works at [employer] in [place].     Review of patient's allergies indicates:   Allergen Reactions    Ace inhibitors      Other reaction(s): cough         PHYSICAL EXAM:  Vitals:    22 1002   BP: (!) 165/90   Pulse: (!) 54       General: NAD  Neuro: AAOx4  Cardio: S1 and S2, RRR  Resp: Moving air appropriately, breathing even and unlabored  Abd: Soft, NT, ND  Ext: Warm and well perfused  Breast: right breast with palpable 2cm mass in right upper outer quadrant in deep tissue. No erythema. Mildly  TTP.      PERTINENT LABS:  Reviewed. None.      PERTINENT IMAGING:  US 4/19:  Impression:     Tubular hyperechoic structures within the right breast which may be seen with dilated ducts. No discrete abscess is visualized.     Slightly increased perfusion of the adjacent subcutaneous soft tissue which may be seen with cellulitis.     Electronically signed by resident: Angel Garvin  Date:                                            04/19/2022  Time:                                           10:09     Electronically signed by: Josh Mccain MD  Date:                                            04/19/2022  Time:                                           10:35      MMG Screening 3/18/22:  Impression:   No mammographic evidence of malignancy.     BI-RADS Category 1: Negative     Recommendation:  Routine screening mammogram in 1 year is recommended.      ASSESSMENT/PLAN:  47 y.o. female with periductal mastitis that has improved but still with residual lump in right breast. Recent imaging has been thus far benign and lump related to new inflammation that occurred almost 2 weeks ago. Will give it some time to see if self resolves and repeat ultrasound at next visit.     - Return to clinic in 1 month with right breast ultrasound      Susy Casillas MD  Surgery Resident, PGY-2  4/28/2022 1:30 PM        I have personally performed a detailed history and physical examination on this patient. My findings are summarized in the resident's note included in the record.  Dramatic improvement per the resident assessment (same resident saw the patient in the ED that is in my clinic today)  Will give her some additional time to see if she can completely resolved this problem without surgical intervention

## 2022-05-02 DIAGNOSIS — N61.0 CELLULITIS OF RIGHT BREAST: Primary | ICD-10-CM

## 2022-05-26 ENCOUNTER — LAB VISIT (OUTPATIENT)
Dept: LAB | Facility: HOSPITAL | Age: 47
End: 2022-05-26
Attending: INTERNAL MEDICINE
Payer: MEDICAID

## 2022-05-26 DIAGNOSIS — I10 ESSENTIAL HYPERTENSION: ICD-10-CM

## 2022-05-26 LAB
ALBUMIN SERPL BCP-MCNC: 4.2 G/DL (ref 3.5–5.2)
ALP SERPL-CCNC: 20 U/L (ref 55–135)
ALT SERPL W/O P-5'-P-CCNC: 22 U/L (ref 10–44)
ANION GAP SERPL CALC-SCNC: 9 MMOL/L (ref 8–16)
AST SERPL-CCNC: 33 U/L (ref 10–40)
BILIRUB SERPL-MCNC: 1.2 MG/DL (ref 0.1–1)
BUN SERPL-MCNC: 12 MG/DL (ref 6–20)
CALCIUM SERPL-MCNC: 9.4 MG/DL (ref 8.7–10.5)
CHLORIDE SERPL-SCNC: 103 MMOL/L (ref 95–110)
CO2 SERPL-SCNC: 28 MMOL/L (ref 23–29)
CREAT SERPL-MCNC: 1 MG/DL (ref 0.5–1.4)
EST. GFR  (AFRICAN AMERICAN): >60 ML/MIN/1.73 M^2
EST. GFR  (NON AFRICAN AMERICAN): >60 ML/MIN/1.73 M^2
GLUCOSE SERPL-MCNC: 91 MG/DL (ref 70–110)
POTASSIUM SERPL-SCNC: 3.3 MMOL/L (ref 3.5–5.1)
PROT SERPL-MCNC: 7.6 G/DL (ref 6–8.4)
SODIUM SERPL-SCNC: 140 MMOL/L (ref 136–145)

## 2022-05-26 PROCEDURE — 36415 COLL VENOUS BLD VENIPUNCTURE: CPT | Performed by: INTERNAL MEDICINE

## 2022-05-26 PROCEDURE — 80053 COMPREHEN METABOLIC PANEL: CPT | Performed by: INTERNAL MEDICINE

## 2022-06-01 ENCOUNTER — OFFICE VISIT (OUTPATIENT)
Dept: FAMILY MEDICINE | Facility: CLINIC | Age: 47
End: 2022-06-01
Payer: MEDICAID

## 2022-06-01 VITALS
OXYGEN SATURATION: 99 % | TEMPERATURE: 98 F | DIASTOLIC BLOOD PRESSURE: 96 MMHG | HEIGHT: 66 IN | HEART RATE: 77 BPM | BODY MASS INDEX: 27.39 KG/M2 | WEIGHT: 170.44 LBS | SYSTOLIC BLOOD PRESSURE: 148 MMHG

## 2022-06-01 DIAGNOSIS — Z12.11 SCREENING FOR COLON CANCER: ICD-10-CM

## 2022-06-01 DIAGNOSIS — I10 ESSENTIAL HYPERTENSION: Primary | ICD-10-CM

## 2022-06-01 DIAGNOSIS — F32.A DEPRESSION, UNSPECIFIED DEPRESSION TYPE: ICD-10-CM

## 2022-06-01 PROCEDURE — 99999 PR PBB SHADOW E&M-EST. PATIENT-LVL III: CPT | Mod: PBBFAC,,, | Performed by: INTERNAL MEDICINE

## 2022-06-01 PROCEDURE — 1160F PR REVIEW ALL MEDS BY PRESCRIBER/CLIN PHARMACIST DOCUMENTED: ICD-10-PCS | Mod: CPTII,,, | Performed by: INTERNAL MEDICINE

## 2022-06-01 PROCEDURE — 3077F SYST BP >= 140 MM HG: CPT | Mod: CPTII,,, | Performed by: INTERNAL MEDICINE

## 2022-06-01 PROCEDURE — 3080F DIAST BP >= 90 MM HG: CPT | Mod: CPTII,,, | Performed by: INTERNAL MEDICINE

## 2022-06-01 PROCEDURE — 99214 OFFICE O/P EST MOD 30 MIN: CPT | Mod: S$PBB,,, | Performed by: INTERNAL MEDICINE

## 2022-06-01 PROCEDURE — 99999 PR PBB SHADOW E&M-EST. PATIENT-LVL III: ICD-10-PCS | Mod: PBBFAC,,, | Performed by: INTERNAL MEDICINE

## 2022-06-01 PROCEDURE — 99214 PR OFFICE/OUTPT VISIT, EST, LEVL IV, 30-39 MIN: ICD-10-PCS | Mod: S$PBB,,, | Performed by: INTERNAL MEDICINE

## 2022-06-01 PROCEDURE — 3008F PR BODY MASS INDEX (BMI) DOCUMENTED: ICD-10-PCS | Mod: CPTII,,, | Performed by: INTERNAL MEDICINE

## 2022-06-01 PROCEDURE — 4010F ACE/ARB THERAPY RXD/TAKEN: CPT | Mod: CPTII,,, | Performed by: INTERNAL MEDICINE

## 2022-06-01 PROCEDURE — 99213 OFFICE O/P EST LOW 20 MIN: CPT | Mod: PBBFAC,PN | Performed by: INTERNAL MEDICINE

## 2022-06-01 PROCEDURE — 1159F PR MEDICATION LIST DOCUMENTED IN MEDICAL RECORD: ICD-10-PCS | Mod: CPTII,,, | Performed by: INTERNAL MEDICINE

## 2022-06-01 PROCEDURE — 1159F MED LIST DOCD IN RCRD: CPT | Mod: CPTII,,, | Performed by: INTERNAL MEDICINE

## 2022-06-01 PROCEDURE — 1160F RVW MEDS BY RX/DR IN RCRD: CPT | Mod: CPTII,,, | Performed by: INTERNAL MEDICINE

## 2022-06-01 PROCEDURE — 4010F PR ACE/ARB THEARPY RXD/TAKEN: ICD-10-PCS | Mod: CPTII,,, | Performed by: INTERNAL MEDICINE

## 2022-06-01 PROCEDURE — 3080F PR MOST RECENT DIASTOLIC BLOOD PRESSURE >= 90 MM HG: ICD-10-PCS | Mod: CPTII,,, | Performed by: INTERNAL MEDICINE

## 2022-06-01 PROCEDURE — 3077F PR MOST RECENT SYSTOLIC BLOOD PRESSURE >= 140 MM HG: ICD-10-PCS | Mod: CPTII,,, | Performed by: INTERNAL MEDICINE

## 2022-06-01 PROCEDURE — 3008F BODY MASS INDEX DOCD: CPT | Mod: CPTII,,, | Performed by: INTERNAL MEDICINE

## 2022-06-01 RX ORDER — VALSARTAN 320 MG/1
320 TABLET ORAL DAILY
Qty: 90 TABLET | Refills: 1 | Status: SHIPPED | OUTPATIENT
Start: 2022-06-01 | End: 2023-01-09

## 2022-06-01 RX ORDER — FLUOXETINE HYDROCHLORIDE 20 MG/1
20 CAPSULE ORAL DAILY
Qty: 90 CAPSULE | Refills: 3 | Status: SHIPPED | OUTPATIENT
Start: 2022-06-01 | End: 2023-08-01 | Stop reason: ALTCHOICE

## 2022-06-01 RX ORDER — ISOSORBIDE MONONITRATE 30 MG/1
30 TABLET, EXTENDED RELEASE ORAL DAILY
Qty: 30 TABLET | Refills: 11 | Status: CANCELLED | OUTPATIENT
Start: 2022-06-01 | End: 2023-06-01

## 2022-06-01 RX ORDER — AMLODIPINE BESYLATE 10 MG/1
10 TABLET ORAL DAILY
Qty: 90 TABLET | Refills: 1 | Status: SHIPPED | OUTPATIENT
Start: 2022-06-01 | End: 2023-01-09

## 2022-06-01 NOTE — PROGRESS NOTES
"Subjective:       Patient ID: Navjot Segovia is a 47 y.o. female.    Chief Complaint: Follow-up and Hypertension    F/u chronic conditions    HPI: 46 y/o w/ HTN presents for follow up. One month ago awoke with swelling and tenderness of right breast seen in ED ultrasound concerning for ductal dilation versus early abscess she was treated with antibiotics. Swelling and pain has improved still with palpable "knot" to upper outer right quadrent no drainage no fevers/chills to follow up tomorrow with breast surgery with ultrasound.     Regarding blood pressrue reports home reading consistently elevated (wrist cuff). Has been taking thiazide ARB and beta blocker as prescribed. No orthostatic symptoms no orthopnea or PND no LE swelling. Has intermittent bitemporal HA no parathesia or muscle weakness    Review of Systems   Constitutional: Negative for activity change, appetite change, fatigue, fever and unexpected weight change.   HENT: Negative for ear pain, rhinorrhea and sore throat.    Eyes: Negative for discharge and visual disturbance.   Respiratory: Negative for chest tightness, shortness of breath and wheezing.    Cardiovascular: Negative for chest pain, palpitations and leg swelling.   Gastrointestinal: Negative for abdominal pain, constipation and diarrhea.   Endocrine: Negative for cold intolerance and heat intolerance.   Genitourinary: Negative for dysuria and hematuria.   Musculoskeletal: Negative for joint swelling and neck stiffness.   Skin: Negative for rash.   Neurological: Positive for headaches. Negative for dizziness, syncope and weakness.   Psychiatric/Behavioral: Negative for suicidal ideas.       Objective:     Vitals:    06/01/22 0905   BP: (!) 148/96   BP Location: Right arm   Patient Position: Sitting   BP Method: Large (Automatic)   Pulse: 77   Temp: 97.9 °F (36.6 °C)   TempSrc: Oral   SpO2: 99%   Weight: 77.3 kg (170 lb 6.7 oz)   Height: 5' 6" (1.676 m)          Physical Exam  Constitutional:      "  Appearance: She is well-developed.   HENT:      Head: Normocephalic and atraumatic.   Eyes:      Conjunctiva/sclera: Conjunctivae normal.      Pupils: Pupils are equal, round, and reactive to light.   Cardiovascular:      Rate and Rhythm: Normal rate and regular rhythm.      Heart sounds: No murmur heard.    No friction rub. No gallop.   Pulmonary:      Effort: Pulmonary effort is normal.      Breath sounds: Normal breath sounds. No wheezing or rales.   Abdominal:      General: Bowel sounds are normal.      Palpations: Abdomen is soft.      Tenderness: There is no abdominal tenderness. There is no guarding or rebound.   Musculoskeletal:         General: No tenderness. Normal range of motion.      Cervical back: Normal range of motion.      Right lower leg: No edema.      Left lower leg: No edema.   Skin:     General: Skin is warm and dry.   Neurological:      General: No focal deficit present.      Mental Status: She is alert and oriented to person, place, and time.      Cranial Nerves: No cranial nerve deficit.      Comments: Symmetric face  Negative pronator drift  5/5 distal motor strength in all extremities         Assessment and Plan   1. Essential hypertension  Add ccb bp check in two weeks  - valsartan (DIOVAN) 320 MG tablet; Take 1 tablet (320 mg total) by mouth once daily.  Dispense: 90 tablet; Refill: 1  - amLODIPine (NORVASC) 10 MG tablet; Take 1 tablet (10 mg total) by mouth once daily.  Dispense: 90 tablet; Refill: 1    2. Screening for colon cancer  Referral for screening colonoscpy  - Case Request Endoscopy: COLONOSCOPY    3. Depression, unspecified depression type  Resume ssri follow up to months to monitor symtoms  - FLUoxetine 20 MG capsule; Take 1 capsule (20 mg total) by mouth once daily.  Dispense: 90 capsule; Refill: 3

## 2022-06-02 ENCOUNTER — OFFICE VISIT (OUTPATIENT)
Dept: SURGERY | Facility: CLINIC | Age: 47
End: 2022-06-02
Payer: MEDICAID

## 2022-06-02 ENCOUNTER — HOSPITAL ENCOUNTER (OUTPATIENT)
Dept: RADIOLOGY | Facility: HOSPITAL | Age: 47
Discharge: HOME | End: 2022-06-02
Attending: SURGERY
Payer: MEDICAID

## 2022-06-02 VITALS
HEART RATE: 73 BPM | WEIGHT: 171.19 LBS | BODY MASS INDEX: 27.51 KG/M2 | SYSTOLIC BLOOD PRESSURE: 180 MMHG | HEIGHT: 66 IN | DIASTOLIC BLOOD PRESSURE: 100 MMHG | OXYGEN SATURATION: 98 %

## 2022-06-02 DIAGNOSIS — N61.0 CELLULITIS OF RIGHT BREAST: ICD-10-CM

## 2022-06-02 DIAGNOSIS — N60.11 MASTITIS CHRONIC, RIGHT: Primary | ICD-10-CM

## 2022-06-02 PROCEDURE — 99211 PR OFFICE/OUTPT VISIT, EST, LEVL I: ICD-10-PCS | Mod: S$PBB,,, | Performed by: SURGERY

## 2022-06-02 PROCEDURE — 1160F RVW MEDS BY RX/DR IN RCRD: CPT | Mod: CPTII,,, | Performed by: SURGERY

## 2022-06-02 PROCEDURE — 4010F ACE/ARB THERAPY RXD/TAKEN: CPT | Mod: CPTII,,, | Performed by: SURGERY

## 2022-06-02 PROCEDURE — 3080F PR MOST RECENT DIASTOLIC BLOOD PRESSURE >= 90 MM HG: ICD-10-PCS | Mod: CPTII,,, | Performed by: SURGERY

## 2022-06-02 PROCEDURE — 1160F PR REVIEW ALL MEDS BY PRESCRIBER/CLIN PHARMACIST DOCUMENTED: ICD-10-PCS | Mod: CPTII,,, | Performed by: SURGERY

## 2022-06-02 PROCEDURE — 76642 ULTRASOUND BREAST LIMITED: CPT | Mod: 26,RT,, | Performed by: RADIOLOGY

## 2022-06-02 PROCEDURE — 76642 US BREAST RIGHT LIMITED: ICD-10-PCS | Mod: 26,RT,, | Performed by: RADIOLOGY

## 2022-06-02 PROCEDURE — 3008F BODY MASS INDEX DOCD: CPT | Mod: CPTII,,, | Performed by: SURGERY

## 2022-06-02 PROCEDURE — 3080F DIAST BP >= 90 MM HG: CPT | Mod: CPTII,,, | Performed by: SURGERY

## 2022-06-02 PROCEDURE — 76642 ULTRASOUND BREAST LIMITED: CPT | Mod: TC,RT

## 2022-06-02 PROCEDURE — 3008F PR BODY MASS INDEX (BMI) DOCUMENTED: ICD-10-PCS | Mod: CPTII,,, | Performed by: SURGERY

## 2022-06-02 PROCEDURE — 1159F PR MEDICATION LIST DOCUMENTED IN MEDICAL RECORD: ICD-10-PCS | Mod: CPTII,,, | Performed by: SURGERY

## 2022-06-02 PROCEDURE — 99211 OFF/OP EST MAY X REQ PHY/QHP: CPT | Mod: S$PBB,,, | Performed by: SURGERY

## 2022-06-02 PROCEDURE — 99213 OFFICE O/P EST LOW 20 MIN: CPT | Mod: PBBFAC | Performed by: SURGERY

## 2022-06-02 PROCEDURE — 3077F PR MOST RECENT SYSTOLIC BLOOD PRESSURE >= 140 MM HG: ICD-10-PCS | Mod: CPTII,,, | Performed by: SURGERY

## 2022-06-02 PROCEDURE — 4010F PR ACE/ARB THEARPY RXD/TAKEN: ICD-10-PCS | Mod: CPTII,,, | Performed by: SURGERY

## 2022-06-02 PROCEDURE — 3077F SYST BP >= 140 MM HG: CPT | Mod: CPTII,,, | Performed by: SURGERY

## 2022-06-02 PROCEDURE — 99999 PR PBB SHADOW E&M-EST. PATIENT-LVL III: CPT | Mod: PBBFAC,,, | Performed by: SURGERY

## 2022-06-02 PROCEDURE — 99999 PR PBB SHADOW E&M-EST. PATIENT-LVL III: ICD-10-PCS | Mod: PBBFAC,,, | Performed by: SURGERY

## 2022-06-02 PROCEDURE — 1159F MED LIST DOCD IN RCRD: CPT | Mod: CPTII,,, | Performed by: SURGERY

## 2022-06-02 NOTE — PROGRESS NOTES
GENERAL SURGERY CLINIC NOTE    HPI:  Navjot Segovia is a 47 y.o. female with history of HTN and HLD who was seen in the ED by General Surgery for concern of right breast abscess. Patient states that she started having right breast pain on 4/16 which worsened into the next day. She described the pain as radiating from the nipple and towards her axilla. She states that her breast became swollen and red so she started taking some of her son's Bactrim. She took 4 doses total. She denies any fever, chills, nausea, or vomiting. She denies any palpable masses. Patient had a screening mammogram performed last month that was negative for any abnormalities. She states she has had left breast abscesses that have required I&Ds in the past, but they were located more medial and looked more 'cyst' like. She denies any prior infections of her right breast or any breast surgeries. She breast fed her children (youngest child 16 y.o.) and denies any complications during that time. No history of breast trauma. She still gets her monthly menses. Has history of breast cancer in her sister (diagnosed in her late 50s) and a few cousins on her mother's side. She denies smoking history. Not on any blood thinners. Bedside US performed in the ED revealed a possible fluid collection so patient was sent for formal US which showed findings consistent with dilated ducts. No abcess seen. WBC wnl on labs.     4/28/22  Since ED, patient reports improvement in breast swelling and redness. Still can feel a lump deep in her right breast that is slightly tender to the touch. She denies fever or chills. Has two more days of bactrim. States that the lump has seemed to get slightly smaller but then has remained the same for the last couple of days. No nipple discharge.     Interval History 6/2/22:   Ms Segovia returns to clinic today reporting resolution of her symptoms. She has had no pain/tenderness, drainage, erythema, fevers, or chills. She had an  ultrasound today which demonstrated resolution of her inflammation and dilated ducts.    ROS:   Gen: No F/C, unintentional weight loss, night sweats, fatigue  Cardio: No chest pain, palpitations, syncope  Resp: No shortness of breath, cough, wheeze  GI: No abdominal pain, N/V, change in bowel habits, change in stool caliber or color, bleeding per rectum  : No dysuria, hematuria, frequency  Breast: No pain, drainage, or erythema    Past Medical History:   Diagnosis Date    Anxiety     Depression     History of acne     Hx of psychiatric care     Hyperlipidemia     Hypertension     Keloid cicatrix     Psychiatric problem     Psychosis     Sleep difficulties     Therapy      Past Surgical History:   Procedure Laterality Date     SECTION, CLASSIC      x 2    Laparoscopic bilateral tubal ligation.      TONSILLECTOMY       Social History     Socioeconomic History    Marital status: Significant Other    Number of children: 3   Occupational History    Occupation:    Tobacco Use    Smoking status: Never Smoker    Smokeless tobacco: Never Used   Substance and Sexual Activity    Alcohol use: Yes     Comment: Social; rare use    Drug use: Yes     Types: Marijuana     Comment: uses infrequently    Sexual activity: Yes     Partners: Male     Birth control/protection: See Surgical Hx   Other Topics Concern    Patient feels they ought to cut down on drinking/drug use No    Patient annoyed by others criticizing their drinking/drug use No    Patient has felt bad or guilty about drinking/drug use No    Patient has had a drink/used drugs as an eye opener in the AM No   Social History Narrative    Engaged to [place].    Has 3 children.    Works at [employer] in [place].     Review of patient's allergies indicates:   Allergen Reactions    Ace inhibitors      Other reaction(s): cough         PHYSICAL EXAM:  Vitals:    22 0841   BP: (!) 180/100   Pulse: 73       General:  NAD  Neuro: AAOx4  Cardio: S1 and S2, RRR  Resp: Moving air appropriately, breathing even and unlabored  Abd: Soft, NT, ND  Ext: Warm and well perfused  Breast: right breast with no discrete mass or fluctuance. No warmth or erythema      PERTINENT LABS:  Reviewed. None.      PERTINENT IMAGING:  US 6/2:  Resolution of ductal dilation. No collection. BIRADS2      ASSESSMENT/PLAN:  47 y.o. female with periductal mastitis that has now resolved. She is otherwise doing well.    RTC prn  Will need screening MMG in 04/2023    Gregory Ricks MD  General Surgery PGY-2  06/02/2022          I have personally performed a detailed history and physical examination on this patient. My findings are summarized in the resident's note included in the record.  Discussed next steps  Currently she has no evidence of breast infection  Her breasts are extremely dense on exam in the upper outer quadrants  She is considering a reduction/lift  However, given her recent problem with breast infection she is going to defer surgery at this time  She is perimenopausal and I am hopeful that the breast density will begin to subside  Will see prn

## 2022-09-17 ENCOUNTER — PATIENT MESSAGE (OUTPATIENT)
Dept: SURGERY | Facility: CLINIC | Age: 47
End: 2022-09-17
Payer: MEDICAID

## 2022-09-27 ENCOUNTER — OFFICE VISIT (OUTPATIENT)
Dept: SURGERY | Facility: CLINIC | Age: 47
End: 2022-09-27
Payer: MEDICAID

## 2022-09-27 VITALS
DIASTOLIC BLOOD PRESSURE: 83 MMHG | SYSTOLIC BLOOD PRESSURE: 159 MMHG | HEIGHT: 66 IN | HEART RATE: 77 BPM | BODY MASS INDEX: 26.84 KG/M2 | WEIGHT: 167 LBS

## 2022-09-27 DIAGNOSIS — N63.0 MASS OF BREAST, UNSPECIFIED LATERALITY: Primary | ICD-10-CM

## 2022-09-27 PROCEDURE — 99999 PR PBB SHADOW E&M-EST. PATIENT-LVL III: CPT | Mod: PBBFAC,,, | Performed by: PHYSICIAN ASSISTANT

## 2022-09-27 PROCEDURE — 3079F DIAST BP 80-89 MM HG: CPT | Mod: CPTII,,, | Performed by: PHYSICIAN ASSISTANT

## 2022-09-27 PROCEDURE — 99999 PR PBB SHADOW E&M-EST. PATIENT-LVL III: ICD-10-PCS | Mod: PBBFAC,,, | Performed by: PHYSICIAN ASSISTANT

## 2022-09-27 PROCEDURE — 99214 OFFICE O/P EST MOD 30 MIN: CPT | Mod: S$PBB,,, | Performed by: PHYSICIAN ASSISTANT

## 2022-09-27 PROCEDURE — 1159F MED LIST DOCD IN RCRD: CPT | Mod: CPTII,,, | Performed by: PHYSICIAN ASSISTANT

## 2022-09-27 PROCEDURE — 99214 PR OFFICE/OUTPT VISIT, EST, LEVL IV, 30-39 MIN: ICD-10-PCS | Mod: S$PBB,,, | Performed by: PHYSICIAN ASSISTANT

## 2022-09-27 PROCEDURE — 1159F PR MEDICATION LIST DOCUMENTED IN MEDICAL RECORD: ICD-10-PCS | Mod: CPTII,,, | Performed by: PHYSICIAN ASSISTANT

## 2022-09-27 PROCEDURE — 4010F PR ACE/ARB THEARPY RXD/TAKEN: ICD-10-PCS | Mod: CPTII,,, | Performed by: PHYSICIAN ASSISTANT

## 2022-09-27 PROCEDURE — 4010F ACE/ARB THERAPY RXD/TAKEN: CPT | Mod: CPTII,,, | Performed by: PHYSICIAN ASSISTANT

## 2022-09-27 PROCEDURE — 3077F SYST BP >= 140 MM HG: CPT | Mod: CPTII,,, | Performed by: PHYSICIAN ASSISTANT

## 2022-09-27 PROCEDURE — 3077F PR MOST RECENT SYSTOLIC BLOOD PRESSURE >= 140 MM HG: ICD-10-PCS | Mod: CPTII,,, | Performed by: PHYSICIAN ASSISTANT

## 2022-09-27 PROCEDURE — 3079F PR MOST RECENT DIASTOLIC BLOOD PRESSURE 80-89 MM HG: ICD-10-PCS | Mod: CPTII,,, | Performed by: PHYSICIAN ASSISTANT

## 2022-09-27 PROCEDURE — 99213 OFFICE O/P EST LOW 20 MIN: CPT | Mod: PBBFAC | Performed by: PHYSICIAN ASSISTANT

## 2022-09-27 NOTE — PROGRESS NOTES
[facility]  Department of Surgery      REFERRING PROVIDER: Aaareferral Self  No address on file Primary Doctor No  CHIEF COMPLAINT:   Chief Complaint   Patient presents with    Breast Mass     Palpable mass       Subjective:      Navjot Segovia is a 47 y.o. perimenopausal female referred for evaluation of a breast abscess. Patient was seen by Dr. Gtz on 22 for cellulitis of the right breast. She described the pain as radiating from the nipple and towards her axilla. She states that her breast became swollen and red so she started taking some of her son's Bactrim. She took 4 doses total. She states she has had left breast abscesses that have required I&Ds in the past, but they were located more medial and looked more 'cyst' like. She denies any prior infections of her right breast or any breast surgeries. She breast fed her children (youngest child 16 y.o.) and denies any complications during that time. No history of breast trauma. She still gets her monthly menses. Has history of breast cancer in her sister (diagnosed in her late 50s) and a few cousins on her mother's side. She denies smoking history. Not on any blood thinners. Bedside US performed in the ED revealed a possible fluid collection so patient was sent for formal US which showed findings consistent with dilated ducts. No abcess seen. WBC wnl on labs. Prescribed more bactrim.   Patient was seen for follow up in  with Dr. Gtz showing good resolution.     Interval History 10/15/22:  Patient returns for persistent palpable mass of the right breast. Patient states the area is tender to palpation. Denies associated redness, erythema or drainage. Denies other breast complaints of nipple discharge or other palpable masses.     GYN History:  Age of menarche was 12.   Patient is .   Age of first live birth was 18.   Patient did not breast feed.    Past Medical History:   Diagnosis Date    Anxiety     Depression     History of acne      Hx of psychiatric care     Hyperlipidemia     Hypertension     Keloid cicatrix     Psychiatric problem     Psychosis     Sleep difficulties     Therapy      Past Surgical History:   Procedure Laterality Date     SECTION, CLASSIC      x 2    Laparoscopic bilateral tubal ligation.      TONSILLECTOMY       Current Outpatient Medications on File Prior to Visit   Medication Sig Dispense Refill    amLODIPine (NORVASC) 10 MG tablet Take 1 tablet (10 mg total) by mouth once daily. 90 tablet 1    chlorthalidone (HYGROTEN) 25 MG Tab TAKE 1 TABLET(25 MG) BY MOUTH EVERY DAY 90 tablet 0    fluocinonide (LIDEX) 0.05 % external solution APPLY TO AFFECTED AREA ONCE A DAY  4    FLUoxetine 20 MG capsule Take 1 capsule (20 mg total) by mouth once daily. 90 capsule 3    ibuprofen (ADVIL,MOTRIN) 600 MG tablet Take 1 tablet (600 mg total) by mouth every 6 (six) hours as needed for Pain. 20 tablet 0    ketoconazole (NIZORAL) 2 % shampoo APPLY TO THE SCALP 2 TIMES WEEKLY  2    metoprolol succinate (TOPROL-XL) 100 MG 24 hr tablet Take 2 tablets (200 mg total) by mouth once daily. 180 tablet 3    multivitamin with minerals tablet Take 1 tablet by mouth once daily.      tretinoin (RETIN-A) 0.025 % cream APPLY THIN LAYER TO FACE AT BEDTIME  3    valsartan (DIOVAN) 320 MG tablet Take 1 tablet (320 mg total) by mouth once daily. 90 tablet 1     No current facility-administered medications on file prior to visit.     Social History     Socioeconomic History    Marital status: Significant Other    Number of children: 3   Occupational History    Occupation:    Tobacco Use    Smoking status: Never    Smokeless tobacco: Never   Substance and Sexual Activity    Alcohol use: Yes     Comment: Social; rare use    Drug use: Yes     Types: Marijuana     Comment: uses infrequently    Sexual activity: Yes     Partners: Male     Birth control/protection: See Surgical Hx   Other Topics Concern    Patient feels they ought to cut  down on drinking/drug use No    Patient annoyed by others criticizing their drinking/drug use No    Patient has felt bad or guilty about drinking/drug use No    Patient has had a drink/used drugs as an eye opener in the AM No   Social History Narrative    Engaged to [place].    Has 3 children.    Works at [employer] in [place].     Family History   Problem Relation Age of Onset    Hypertension Father     Heart disease Father 46    Aortic aneurysm Father         abdominal    Stroke Father 46    Hypertension Mother     Depression Sister     Breast cancer Sister 52    Schizophrenia Maternal Uncle     Schizophrenia Cousin     Cancer Cousin 50        cervical vs. ovarian    Breast cancer Maternal Cousin     Breast cancer Maternal Cousin     Breast cancer Maternal Cousin     Ovarian cancer Neg Hx        Review of Systems  Review of Systems   Constitutional:  Negative for chills, fever and malaise/fatigue.   HENT:  Negative for congestion.    Eyes:  Negative for discharge.   Respiratory:  Negative for cough, shortness of breath and stridor.    Cardiovascular:  Negative for chest pain and palpitations.   Gastrointestinal:  Negative for abdominal pain and nausea.   Neurological:  Negative for headaches.   Psychiatric/Behavioral:  The patient is not nervous/anxious.       Objective:     Vitals:    09/27/22 1423   BP: (!) 159/83   Pulse: 77       Physical Exam   Vitals reviewed.  Constitutional: She is oriented to person, place, and time.   HENT:   Head: Normocephalic and atraumatic.   Nose: Nose normal.   Eyes: Pupils are equal, round, and reactive to light. Right eye exhibits no discharge. Left eye exhibits no discharge.   Pulmonary/Chest: Effort normal and breath sounds normal. No stridor. No respiratory distress. She exhibits no mass, no tenderness and no edema. Right breast exhibits no inverted nipple, no mass, no nipple discharge, no skin change and no tenderness. Left breast exhibits no inverted nipple, no mass, no nipple  discharge, no skin change and no tenderness. No breast swelling or bleeding. Breasts are symmetrical.       Abdominal: Normal appearance.   Genitourinary: No breast swelling or bleeding.   Neurological: She is alert and oriented to person, place, and time.   Skin: Skin is warm and dry.     Psychiatric: Her behavior is normal. Mood, judgment and thought content normal.       Assessment:      Navjot Segovia is a 47 y.o. premenopausal female who presents for re-evaluation of residual mass at site of previous right breast abscess.      Plan:   1. CBE without significant residual palpable concern at site of previous abscess.   2. Discussed that patient residual findings on self exam are likely scar tissue related to her previous infection. Advised watchful waiting. No further work up recommended at this time.  3. Will see back PRN.   4. Patient verbalized understanding of plan. All questions asked and answered. Advised to call our office with any new or worsening sxs.     Araceli Steen PA-C  Breast Surgery

## 2022-11-19 DIAGNOSIS — Z12.11 SCREENING FOR COLON CANCER: Primary | ICD-10-CM

## 2023-01-07 DIAGNOSIS — I10 ESSENTIAL HYPERTENSION: ICD-10-CM

## 2023-01-07 NOTE — TELEPHONE ENCOUNTER
Refill Routing Note   Medication(s) are not appropriate for processing by [facility] for the following reason(s):      - Unclear if patient follows with you     [facility] action(s):  Defer          Medication reconciliation completed: No     Appointments  past 12m or future 3m with PCP    Date Provider   Last Visit   6/1/2022 Henrik Vazquez MD   Next Visit   Visit date not found Henrik Vazquez MD   ED visits in past 90 days: 0        Note composed:12:59 PM 01/07/2023

## 2023-01-09 RX ORDER — AMLODIPINE BESYLATE 10 MG/1
TABLET ORAL
Qty: 90 TABLET | Refills: 0 | Status: SHIPPED | OUTPATIENT
Start: 2023-01-09 | End: 2023-04-12

## 2023-01-09 RX ORDER — VALSARTAN 320 MG/1
TABLET ORAL
Qty: 90 TABLET | Refills: 0 | Status: SHIPPED | OUTPATIENT
Start: 2023-01-09 | End: 2023-04-12

## 2023-03-22 ENCOUNTER — LAB VISIT (OUTPATIENT)
Dept: LAB | Facility: HOSPITAL | Age: 48
End: 2023-03-22
Attending: INTERNAL MEDICINE
Payer: MEDICAID

## 2023-03-22 DIAGNOSIS — I10 ESSENTIAL HYPERTENSION: ICD-10-CM

## 2023-03-22 LAB
ALBUMIN SERPL BCP-MCNC: 3.8 G/DL (ref 3.5–5.2)
ALP SERPL-CCNC: 21 U/L (ref 55–135)
ALT SERPL W/O P-5'-P-CCNC: 19 U/L (ref 10–44)
ANION GAP SERPL CALC-SCNC: 7 MMOL/L (ref 8–16)
AST SERPL-CCNC: 22 U/L (ref 10–40)
BASOPHILS # BLD AUTO: 0.02 K/UL (ref 0–0.2)
BASOPHILS NFR BLD: 0.6 % (ref 0–1.9)
BILIRUB SERPL-MCNC: 0.7 MG/DL (ref 0.1–1)
BUN SERPL-MCNC: 13 MG/DL (ref 6–20)
CALCIUM SERPL-MCNC: 8.9 MG/DL (ref 8.7–10.5)
CHLORIDE SERPL-SCNC: 104 MMOL/L (ref 95–110)
CO2 SERPL-SCNC: 25 MMOL/L (ref 23–29)
CREAT SERPL-MCNC: 1 MG/DL (ref 0.5–1.4)
DIFFERENTIAL METHOD: ABNORMAL
EOSINOPHIL # BLD AUTO: 0.1 K/UL (ref 0–0.5)
EOSINOPHIL NFR BLD: 1.5 % (ref 0–8)
ERYTHROCYTE [DISTWIDTH] IN BLOOD BY AUTOMATED COUNT: 12.7 % (ref 11.5–14.5)
EST. GFR  (NO RACE VARIABLE): >60 ML/MIN/1.73 M^2
GLUCOSE SERPL-MCNC: 85 MG/DL (ref 70–110)
HCT VFR BLD AUTO: 37 % (ref 37–48.5)
HGB BLD-MCNC: 12.9 G/DL (ref 12–16)
IMM GRANULOCYTES # BLD AUTO: 0.01 K/UL (ref 0–0.04)
IMM GRANULOCYTES NFR BLD AUTO: 0.3 % (ref 0–0.5)
LYMPHOCYTES # BLD AUTO: 1.3 K/UL (ref 1–4.8)
LYMPHOCYTES NFR BLD: 41.2 % (ref 18–48)
MCH RBC QN AUTO: 31.8 PG (ref 27–31)
MCHC RBC AUTO-ENTMCNC: 34.9 G/DL (ref 32–36)
MCV RBC AUTO: 91 FL (ref 82–98)
MONOCYTES # BLD AUTO: 0.5 K/UL (ref 0.3–1)
MONOCYTES NFR BLD: 14.9 % (ref 4–15)
NEUTROPHILS # BLD AUTO: 1.3 K/UL (ref 1.8–7.7)
NEUTROPHILS NFR BLD: 41.5 % (ref 38–73)
NRBC BLD-RTO: 0 /100 WBC
PLATELET # BLD AUTO: 273 K/UL (ref 150–450)
PMV BLD AUTO: 9.8 FL (ref 9.2–12.9)
POTASSIUM SERPL-SCNC: 3.6 MMOL/L (ref 3.5–5.1)
PROT SERPL-MCNC: 6.6 G/DL (ref 6–8.4)
RBC # BLD AUTO: 4.06 M/UL (ref 4–5.4)
SODIUM SERPL-SCNC: 136 MMOL/L (ref 136–145)
WBC # BLD AUTO: 3.23 K/UL (ref 3.9–12.7)

## 2023-03-22 PROCEDURE — 36415 COLL VENOUS BLD VENIPUNCTURE: CPT | Performed by: INTERNAL MEDICINE

## 2023-03-22 PROCEDURE — 80053 COMPREHEN METABOLIC PANEL: CPT | Performed by: INTERNAL MEDICINE

## 2023-03-22 PROCEDURE — 85025 COMPLETE CBC W/AUTO DIFF WBC: CPT | Performed by: INTERNAL MEDICINE

## 2023-03-29 ENCOUNTER — OFFICE VISIT (OUTPATIENT)
Dept: FAMILY MEDICINE | Facility: CLINIC | Age: 48
End: 2023-03-29
Payer: MEDICAID

## 2023-03-29 VITALS
OXYGEN SATURATION: 98 % | HEART RATE: 60 BPM | DIASTOLIC BLOOD PRESSURE: 88 MMHG | TEMPERATURE: 99 F | BODY MASS INDEX: 27.03 KG/M2 | WEIGHT: 168.19 LBS | SYSTOLIC BLOOD PRESSURE: 138 MMHG | HEIGHT: 66 IN | RESPIRATION RATE: 18 BRPM

## 2023-03-29 DIAGNOSIS — M19.011 ARTHRITIS OF RIGHT ACROMIOCLAVICULAR JOINT: ICD-10-CM

## 2023-03-29 DIAGNOSIS — I10 ESSENTIAL HYPERTENSION: Primary | ICD-10-CM

## 2023-03-29 DIAGNOSIS — Z12.31 ENCOUNTER FOR SCREENING MAMMOGRAM FOR MALIGNANT NEOPLASM OF BREAST: ICD-10-CM

## 2023-03-29 PROCEDURE — 99999 PR PBB SHADOW E&M-EST. PATIENT-LVL IV: CPT | Mod: PBBFAC,,, | Performed by: INTERNAL MEDICINE

## 2023-03-29 PROCEDURE — 1160F PR REVIEW ALL MEDS BY PRESCRIBER/CLIN PHARMACIST DOCUMENTED: ICD-10-PCS | Mod: CPTII,,, | Performed by: INTERNAL MEDICINE

## 2023-03-29 PROCEDURE — 1160F RVW MEDS BY RX/DR IN RCRD: CPT | Mod: CPTII,,, | Performed by: INTERNAL MEDICINE

## 2023-03-29 PROCEDURE — 1159F MED LIST DOCD IN RCRD: CPT | Mod: CPTII,,, | Performed by: INTERNAL MEDICINE

## 2023-03-29 PROCEDURE — 3008F BODY MASS INDEX DOCD: CPT | Mod: CPTII,,, | Performed by: INTERNAL MEDICINE

## 2023-03-29 PROCEDURE — 99999 PR PBB SHADOW E&M-EST. PATIENT-LVL IV: ICD-10-PCS | Mod: PBBFAC,,, | Performed by: INTERNAL MEDICINE

## 2023-03-29 PROCEDURE — 3079F DIAST BP 80-89 MM HG: CPT | Mod: CPTII,,, | Performed by: INTERNAL MEDICINE

## 2023-03-29 PROCEDURE — 3075F SYST BP GE 130 - 139MM HG: CPT | Mod: CPTII,,, | Performed by: INTERNAL MEDICINE

## 2023-03-29 PROCEDURE — 3008F PR BODY MASS INDEX (BMI) DOCUMENTED: ICD-10-PCS | Mod: CPTII,,, | Performed by: INTERNAL MEDICINE

## 2023-03-29 PROCEDURE — 4010F PR ACE/ARB THEARPY RXD/TAKEN: ICD-10-PCS | Mod: CPTII,,, | Performed by: INTERNAL MEDICINE

## 2023-03-29 PROCEDURE — 4010F ACE/ARB THERAPY RXD/TAKEN: CPT | Mod: CPTII,,, | Performed by: INTERNAL MEDICINE

## 2023-03-29 PROCEDURE — 99214 OFFICE O/P EST MOD 30 MIN: CPT | Mod: PBBFAC,PN | Performed by: INTERNAL MEDICINE

## 2023-03-29 PROCEDURE — 3075F PR MOST RECENT SYSTOLIC BLOOD PRESS GE 130-139MM HG: ICD-10-PCS | Mod: CPTII,,, | Performed by: INTERNAL MEDICINE

## 2023-03-29 PROCEDURE — 99214 OFFICE O/P EST MOD 30 MIN: CPT | Mod: S$PBB,,, | Performed by: INTERNAL MEDICINE

## 2023-03-29 PROCEDURE — 1159F PR MEDICATION LIST DOCUMENTED IN MEDICAL RECORD: ICD-10-PCS | Mod: CPTII,,, | Performed by: INTERNAL MEDICINE

## 2023-03-29 PROCEDURE — 99214 PR OFFICE/OUTPT VISIT, EST, LEVL IV, 30-39 MIN: ICD-10-PCS | Mod: S$PBB,,, | Performed by: INTERNAL MEDICINE

## 2023-03-29 PROCEDURE — 3079F PR MOST RECENT DIASTOLIC BLOOD PRESSURE 80-89 MM HG: ICD-10-PCS | Mod: CPTII,,, | Performed by: INTERNAL MEDICINE

## 2023-03-29 NOTE — PROGRESS NOTES
Subjective:       Patient ID: Navjot Segovia is a 47 y.o. female.    Chief Complaint: Follow-up (3 month f/u) and Numbness (Numbness in right foot)    F/u chronic conditions discuss foot numbness, right shoulder pain    HPI: 48 y/o w/ HTN for follow up after break in care. Taking arb and ccb daily (no longer taking thiazide or beta blocker) home readings at goal. She has been doing more frequent cardio exercise (jump rope and weights) she has noted over last month intermittent numbness to right foot feels abnormal sensation from lateral right foot to dorsal foot not constant feels it improves when she is exercisign no swelling no motor weakness no trauma or fall does not feel off balance.     She also notes over last two months increasing pain with abduction of right [place]oulder pain primarily at AC joint no radiation below elbow minimal relief with NSAID no parathesia of upper extremities no difficulty with  strenght or fine motor activities (such as writing)     HM due for annual mammogram for breast cancer screening    Review of Systems   Constitutional:  Negative for activity change, appetite change, fatigue, fever and unexpected weight change.   HENT:  Negative for ear pain, rhinorrhea and sore throat.    Eyes:  Negative for discharge and visual disturbance.   Respiratory:  Negative for chest tightness, shortness of breath and wheezing.    Cardiovascular:  Negative for chest pain, palpitations and leg swelling.   Gastrointestinal:  Negative for abdominal pain, constipation and diarrhea.   Endocrine: Negative for cold intolerance and heat intolerance.   Genitourinary:  Negative for dysuria and hematuria.   Musculoskeletal:  Positive for arthralgias. Negative for joint swelling and neck stiffness.   Skin:  Negative for rash.   Neurological:  Positive for numbness. Negative for dizziness, syncope, weakness and headaches.   Psychiatric/Behavioral:  Negative for suicidal ideas.      Objective:     Vitals:    03/29/23  "0909   BP: 138/88   Pulse: 60   Resp: 18   Temp: 98.8 °F (37.1 °C)   TempSrc: Oral   SpO2: 98%   Weight: 76.3 kg (168 lb 3.4 oz)   Height: 5' 6" (1.676 m)          Physical Exam  Constitutional:       Appearance: She is well-developed.   HENT:      Head: Normocephalic and atraumatic.   Eyes:      General: No scleral icterus.     Conjunctiva/sclera: Conjunctivae normal.   Cardiovascular:      Rate and Rhythm: Normal rate and regular rhythm.      Heart sounds: No murmur heard.    No friction rub. No gallop.   Pulmonary:      Effort: Pulmonary effort is normal.      Breath sounds: Normal breath sounds. No wheezing or rales.   Abdominal:      General: There is no distension.      Palpations: Abdomen is soft.      Tenderness: There is no abdominal tenderness. There is no guarding or rebound.   Musculoskeletal:         General: No tenderness. Normal range of motion.      Cervical back: Normal range of motion.      Comments: Full AROM of shoulder abduction decreased internal rotation on right negative drop sign no point tenderness    Right foot/ankle: 5/5 dorsi/plantart felxion o pain to palapation fo distal MTP no overlying skin changes no laxity to varus/vaglus testing   Skin:     General: Skin is warm and dry.   Neurological:      General: No focal deficit present.      Mental Status: She is alert and oriented to person, place, and time.      Cranial Nerves: No cranial nerve deficit.       Assessment and Plan   1. Essential hypertension  Bp at goal continue current medicaitons  - CBC Auto Differential; Future  - Basic Metabolic Panel; Future    2. Encounter for screening mammogram for malignant neoplasm of breast  mammogram  - Mammo Digital Screening Bilat w/ Vinayak; Future    3. Arthritis of right acromioclavicular joint  Check plain films referral to PT for HEP and ROM exercises no evidence of complete rotator cuff tear by exam  - X-ray Shoulder 2 or More Views Right; Future  - Ambulatory referral/consult to " Physical/Occupational Therapy; Future

## 2023-04-12 DIAGNOSIS — I10 ESSENTIAL HYPERTENSION: ICD-10-CM

## 2023-04-12 RX ORDER — VALSARTAN 320 MG/1
TABLET ORAL
Qty: 90 TABLET | Refills: 3 | Status: SHIPPED | OUTPATIENT
Start: 2023-04-12

## 2023-04-12 RX ORDER — AMLODIPINE BESYLATE 10 MG/1
TABLET ORAL
Qty: 90 TABLET | Refills: 3 | Status: ON HOLD | OUTPATIENT
Start: 2023-04-12 | End: 2023-10-10 | Stop reason: HOSPADM

## 2023-04-12 NOTE — TELEPHONE ENCOUNTER
No new care gaps identified.  [facility] Embedded Care Gaps. Reference number: [id number]. 4/12/2023   3:52:32 AM CDT

## 2023-05-19 ENCOUNTER — CLINICAL SUPPORT (OUTPATIENT)
Dept: ENDOSCOPY | Facility: HOSPITAL | Age: 48
End: 2023-05-19
Attending: INTERNAL MEDICINE
Payer: MEDICAID

## 2023-05-19 DIAGNOSIS — Z12.11 SCREENING FOR COLON CANCER: ICD-10-CM

## 2023-05-19 NOTE — PLAN OF CARE
Called patient for PAT appointment to schedule colonoscopy. Patient stated she will call back to schedule. New PAT appointment scheduled.

## 2023-05-26 ENCOUNTER — CLINICAL SUPPORT (OUTPATIENT)
Dept: REHABILITATION | Facility: HOSPITAL | Age: 48
End: 2023-05-26
Attending: INTERNAL MEDICINE
Payer: MEDICAID

## 2023-05-26 DIAGNOSIS — M19.011 ARTHRITIS OF RIGHT ACROMIOCLAVICULAR JOINT: ICD-10-CM

## 2023-05-26 DIAGNOSIS — G89.29 CHRONIC RIGHT SHOULDER PAIN: Primary | ICD-10-CM

## 2023-05-26 DIAGNOSIS — M62.81 MUSCLE WEAKNESS OF RIGHT UPPER EXTREMITY: ICD-10-CM

## 2023-05-26 DIAGNOSIS — M25.511 CHRONIC RIGHT SHOULDER PAIN: Primary | ICD-10-CM

## 2023-05-26 PROCEDURE — 97161 PT EVAL LOW COMPLEX 20 MIN: CPT | Mod: PN

## 2023-05-26 PROCEDURE — 97140 MANUAL THERAPY 1/> REGIONS: CPT | Mod: PN

## 2023-05-26 PROCEDURE — 97110 THERAPEUTIC EXERCISES: CPT | Mod: PN

## 2023-05-26 NOTE — PROGRESS NOTES
[facility]   Physical Therapy Initial Evaluation      Name: Navjot Segovia  Clinic Number: [id number]    Therapy Diagnosis:   Encounter Diagnoses   Name Primary?    Arthritis of right acromioclavicular joint     Chronic right shoulder pain Yes    Muscle weakness of right upper extremity         Physician: Henrik Vazquez MD    Physician Orders: PT Eval and Treat   Medical Diagnosis from Referral: Arthritis of Right AC joint  Evaluation Date: 2023  Authorization Period Expiration: 10/30/2023  Plan of Care Expiration: 2023  Progress Note Due: 2023  Visit # / Visits authorized:    FOTO:     Time In: 12:00 PM  Time Out: 1:00 PM  Total Appointment Time (timed & untimed codes): 60 minutes    Precautions: Standard   Subjective     Date of onset: 2022    History of current condition - Real reports: Shoulder pain and weakness that has gotten progressively worse the past 3 years and there was no direct change in symptoms since breast lump removal surgery in 2022. Patient is an active adult that strength trains 3x/week for that past 2 years. She has noticed a significant decrease in Right shoulder strength and has not exercised for the past 2 months.      Medical History:   Past Medical History:   Diagnosis Date    Anxiety     Depression     History of acne     Hx of psychiatric care     Hyperlipidemia     Hypertension     Keloid cicatrix     Psychiatric problem     Psychosis     Sleep difficulties     Therapy        Surgical History:   Navjot Segovia  has a past surgical history that includes  section, classic; Laparoscopic bilateral tubal ligation.; and Tonsillectomy.    Medications:   Navjot has a current medication list which includes the following prescription(s): amlodipine, fluocinonide, fluoxetine, ibuprofen, ketoconazole, multivitamin with minerals, tretinoin, and valsartan.    Allergies:   Review of patient's allergies indicates:   Allergen Reactions     Ace inhibitors      Other reaction(s): cough        Imaging, none: Patient has not had MRI of Right shoulder at this time.     Prior Therapy: None  Social History:  lives with their family  Occupation: [employer]  Prior Level of Function: Able to strength train with no pain or limitation.  Current Level of Function: Lacking 50% strength and pain during Right arm strength training.    Pain:  Current 2/10, worst 6/10, best 0/10   Location: right shoulder   Description: Variable  Aggravating Factors: Lifting  Easing Factors: relaxation and ice    Pts goals: Return to her strength training routine without pain or limitation.     Objective     Posture: WFL  Gait: Stable, consistent  Palpation: Increased tenderness of posterior shoulder complex  Sensation: WFL  DTRs: WFL  Myotomes: WFL  Dermatomes: WFL    Range of Motion/Strength:     CERVICAL AROM   Flexion WFL   Extension WFL   Right side bending WFL   Left side bending WFL   Right rotation WFL   Left rotation WFL     Shoulder Right Left   AROM/PROM     flexion  WFL  WFL   extension  WFL WFL   abduction  160/165 WFL   adduction  WFL WFL   Internal rotation  50/55 WFL   ER at 90° abd  60/65 WFL   ER at 0° abd  65/70 WFL     Elbow Right Left   AROM/PROM     flexion  WFL  WFL   extension  WFL  WFL     U/E MMT Right Left   Shoulder Flexion 3-/5 4-/5   Shoulder Extension 3+/5 4+/5   Shoulder Abduction 3-/5 4-/5   Shoulder Adduction 3+/5 4+/5   Shoulder IR 3-/5 3/5   Shoulder ER  @ 0* Abduction 3-/5 3/5   Shoulder ER  @ 90* Abduction 3-/5 3/5   Elbow Flexion  4+/5 4+/5   Elbow Extension 4+/5 4+/5   Rhomboids 3+/5 3+/5   Mid Traps 3+/5 3+/5   Low Traps 3+/5 3+/5         Special Tests:     Neers Impingement: negative  Lift off Test: positive  Gaming Dion: positive    CMS Impairment/Limitation/Restriction for FOTO Shoulder Survey    Therapist reviewed FOTO scores for Navjot Segovia on 5/26/2023.   FOTO documents entered into [employer] - see Media section.    Limitation  Score: 37%  Predicted Score: 33%  DASH: 18.3 %       TREATMENT     Treatment Time In: 12:15 PM  Treatment Time Out: 1:00 PM  Total Treatment time separate from Evaluation: 45 minutes      manual therapy techniques: Spinal manipulation were applied to the cervical (C3-C7) and thoracic (T5-T11) spine to address hypomobility and poor muscle activation for 15 minutes.    therapeutic exercises to develop strength for 30 minutes including:  Prone scapular flexion 3 x10  Prone shoulder flexion (end range) 3x10  Prone scaption 3x10       Home Exercises and Patient Education Provided    Education provided:   - Pt educated on POC  - Pt educated on HEP  - Pt educated on anatomy and physiology of current condition as it relates to signs and symptoms     Written Home Exercises Provided: yes.  Exercises were reviewed and Navjot was able to demonstrate them prior to the end of the session.  Real demonstrated good  understanding of the education provided.     See EMR under Patient Instructions for exercises provided 5/26/2023.    Assessment     Navjot is a 48 y.o. female referred to outpatient Physical Therapy with a medical diagnosis of right shoulder pain. Patient presents with moderately weak scapular stabilization and posterior muscle activation secondary to spinal hypomobility and training regimen targeting gross musculature v. Stabilizing musculature.     Patient prognosis is Good.   Patient will benefit from skilled outpatient Physical Therapy to address the deficits stated above and in the chart below, provide patient /family education, and to maximize patientt's level of independence.     Plan of care discussed with patient: Yes  Patient's spiritual, cultural and educational needs considered and patient is agreeable to the plan of care and goals as stated below:     Anticipated Barriers for therapy: None    Medical Necessity is demonstrated by the following  History  Co-morbidities and personal factors that may impact the plan  of care Co-morbidities:   difficulty sleeping and HTN    Personal Factors:   no deficits     low   Examination  Body Structures and Functions, activity limitations and participation restrictions that may impact the plan of care Body Regions:   neck  upper extremities    Body Systems:    strength    Participation Restrictions:   None    Activity limitations:   Learning and applying knowledge  no deficits    General Tasks and Commands  no deficits    Communication  no deficits    Mobility  lifting and carrying objects    Self care  no deficits    Domestic Life  no deficits    Interactions/Relationships  no deficits    Life Areas  no deficits    Community and Social Life  no deficits         low   Clinical Presentation stable and uncomplicated low   Decision Making/ Complexity Score: low         Goals:  Short Term Goals (4 Weeks):   1) 1. Pt will be compliant with initial HEP to supplement PT in decreasing pain with functional mobility.  2) Pt will improve in active range of motion abduction demonstrated full abduction in standing.   3) Pt will improve scapular strength demonstrated by ability to complete prone scapular series (Is,Ys,Ts) with 3# x 10 each direction.  4) Pt will improve in posterior muscle activation demonstrated by scapular retraction with 10# cable pulley x 10 without pain or limitation.    Long Term Goals (8 Weeks):  1) Pt will be compliant with final HEP to reduce risk of further injury after dc  2) Pt will demonstrate ability to complete 15 pushups with proper form and no signs of pain.   3) Pt will demonstrate ability to complete cable column D2 flexion x 10# x 15 reps without poor form or pain limitation.   4) Pt will report 0/10 shoulder pain while sleeping at night.     Plan       Plan of care Certification: 5/26/2023 to 08/30/2023.    Outpatient Physical Therapy 2 times weekly for 8 weeks to include the following interventions: Electrical Stimulation NMES, Manual Therapy, Moist Heat/ Ice,  Neuromuscular Re-ed, Patient Education, Therapeutic Activities, and Therapeutic Exercise.     Cyndy Mccain, PT, DPT

## 2023-05-29 NOTE — PLAN OF CARE
[facility]   Physical Therapy Initial Evaluation      Name: Navjot Segovia  Clinic Number: [id number]    Therapy Diagnosis:   Encounter Diagnoses   Name Primary?    Arthritis of right acromioclavicular joint     Chronic right shoulder pain Yes    Muscle weakness of right upper extremity         Physician: Henrik Vazquez MD    Physician Orders: PT Eval and Treat   Medical Diagnosis from Referral: Arthritis of Right AC joint  Evaluation Date: 2023  Authorization Period Expiration: 10/30/2023  Plan of Care Expiration: 2023  Progress Note Due: 2023  Visit # / Visits authorized:    FOTO:     Time In: 12:00 PM  Time Out: 1:00 PM  Total Appointment Time (timed & untimed codes): 60 minutes    Precautions: Standard   Subjective     Date of onset: 2022    History of current condition - Real reports: Shoulder pain and weakness that has gotten progressively worse the past 3 years and there was no direct change in symptoms since breast lump removal surgery in 2022. Patient is an active adult that strength trains 3x/week for that past 2 years. She has noticed a significant decrease in Right shoulder strength and has not exercised for the past 2 months.      Medical History:   Past Medical History:   Diagnosis Date    Anxiety     Depression     History of acne     Hx of psychiatric care     Hyperlipidemia     Hypertension     Keloid cicatrix     Psychiatric problem     Psychosis     Sleep difficulties     Therapy        Surgical History:   Navjot Segovia  has a past surgical history that includes  section, classic; Laparoscopic bilateral tubal ligation.; and Tonsillectomy.    Medications:   Navjot has a current medication list which includes the following prescription(s): amlodipine, fluocinonide, fluoxetine, ibuprofen, ketoconazole, multivitamin with minerals, tretinoin, and valsartan.    Allergies:   Review of patient's allergies indicates:   Allergen Reactions     Ace inhibitors      Other reaction(s): cough        Imaging, none: Patient has not had MRI of Right shoulder at this time.     Prior Therapy: None  Social History:  lives with their family  Occupation: [employer]  Prior Level of Function: Able to strength train with no pain or limitation.  Current Level of Function: Lacking 50% strength and pain during Right arm strength training.    Pain:  Current 2/10, worst 6/10, best 0/10   Location: right shoulder   Description: Variable  Aggravating Factors: Lifting  Easing Factors: relaxation and ice    Pts goals: Return to her strength training routine without pain or limitation.     Objective     Posture: WFL  Gait: Stable, consistent  Palpation: Increased tenderness of posterior shoulder complex  Sensation: WFL  DTRs: WFL  Myotomes: WFL  Dermatomes: WFL    Range of Motion/Strength:     CERVICAL AROM   Flexion WFL   Extension WFL   Right side bending WFL   Left side bending WFL   Right rotation WFL   Left rotation WFL     Shoulder Right Left   AROM/PROM     flexion  WFL  WFL   extension  WFL WFL   abduction  160/165 WFL   adduction  WFL WFL   Internal rotation  50/55 WFL   ER at 90° abd  60/65 WFL   ER at 0° abd  65/70 WFL     Elbow Right Left   AROM/PROM     flexion  WFL  WFL   extension  WFL  WFL     U/E MMT Right Left   Shoulder Flexion 3-/5 4-/5   Shoulder Extension 3+/5 4+/5   Shoulder Abduction 3-/5 4-/5   Shoulder Adduction 3+/5 4+/5   Shoulder IR 3-/5 3/5   Shoulder ER  @ 0* Abduction 3-/5 3/5   Shoulder ER  @ 90* Abduction 3-/5 3/5   Elbow Flexion  4+/5 4+/5   Elbow Extension 4+/5 4+/5   Rhomboids 3+/5 3+/5   Mid Traps 3+/5 3+/5   Low Traps 3+/5 3+/5         Special Tests:     Neers Impingement: negative  Lift off Test: positive  Gaming Dion: positive    CMS Impairment/Limitation/Restriction for FOTO Shoulder Survey    Therapist reviewed FOTO scores for Navjot Segovia on 5/26/2023.   FOTO documents entered into [employer] - see Media section.    Limitation  Score: 37%  Predicted Score: 33%  DASH: 18.3 %       TREATMENT     Treatment Time In: 12:15 PM  Treatment Time Out: 1:00 PM  Total Treatment time separate from Evaluation: 45 minutes      manual therapy techniques: Spinal manipulation were applied to the cervical (C3-C7) and thoracic (T5-T11) spine to address hypomobility and poor muscle activation for 15 minutes.    therapeutic exercises to develop strength for 30 minutes including:  Prone scapular flexion 3 x10  Prone shoulder flexion (end range) 3x10  Prone scaption 3x10       Home Exercises and Patient Education Provided    Education provided:   - Pt educated on POC  - Pt educated on HEP  - Pt educated on anatomy and physiology of current condition as it relates to signs and symptoms     Written Home Exercises Provided: yes.  Exercises were reviewed and Navjot was able to demonstrate them prior to the end of the session.  Real demonstrated good  understanding of the education provided.     See EMR under Patient Instructions for exercises provided 5/26/2023.    Assessment     Navjot is a 48 y.o. female referred to outpatient Physical Therapy with a medical diagnosis of right shoulder pain. Patient presents with moderately weak scapular stabilization and posterior muscle activation secondary to spinal hypomobility and training regimen targeting gross musculature v. Stabilizing musculature.     Patient prognosis is Good.   Patient will benefit from skilled outpatient Physical Therapy to address the deficits stated above and in the chart below, provide patient /family education, and to maximize patientt's level of independence.     Plan of care discussed with patient: Yes  Patient's spiritual, cultural and educational needs considered and patient is agreeable to the plan of care and goals as stated below:     Anticipated Barriers for therapy: None    Medical Necessity is demonstrated by the following  History  Co-morbidities and personal factors that may impact the plan  of care Co-morbidities:   difficulty sleeping and HTN    Personal Factors:   no deficits     low   Examination  Body Structures and Functions, activity limitations and participation restrictions that may impact the plan of care Body Regions:   neck  upper extremities    Body Systems:    strength    Participation Restrictions:   None    Activity limitations:   Learning and applying knowledge  no deficits    General Tasks and Commands  no deficits    Communication  no deficits    Mobility  lifting and carrying objects    Self care  no deficits    Domestic Life  no deficits    Interactions/Relationships  no deficits    Life Areas  no deficits    Community and Social Life  no deficits         low   Clinical Presentation stable and uncomplicated low   Decision Making/ Complexity Score: low         Goals:  Short Term Goals (4 Weeks):   1) 1. Pt will be compliant with initial HEP to supplement PT in decreasing pain with functional mobility.  2) Pt will improve in active range of motion abduction demonstrated full abduction in standing.   3) Pt will improve scapular strength demonstrated by ability to complete prone scapular series (Is,Ys,Ts) with 3# x 10 each direction.  4) Pt will improve in posterior muscle activation demonstrated by scapular retraction with 10# cable pulley x 10 without pain or limitation.    Long Term Goals (8 Weeks):  1) Pt will be compliant with final HEP to reduce risk of further injury after dc  2) Pt will demonstrate ability to complete 15 pushups with proper form and no signs of pain.   3) Pt will demonstrate ability to complete cable column D2 flexion x 10# x 15 reps without poor form or pain limitation.   4) Pt will report 0/10 shoulder pain while sleeping at night.     Plan       Plan of care Certification: 5/26/2023 to 08/30/2023.    Outpatient Physical Therapy 2 times weekly for 8 weeks to include the following interventions: Electrical Stimulation NMES, Manual Therapy, Moist Heat/ Ice,  Neuromuscular Re-ed, Patient Education, Therapeutic Activities, and Therapeutic Exercise.     Cyndy Mccain, PT, DPT

## 2023-05-31 ENCOUNTER — CLINICAL SUPPORT (OUTPATIENT)
Dept: REHABILITATION | Facility: HOSPITAL | Age: 48
End: 2023-05-31
Attending: INTERNAL MEDICINE
Payer: MEDICAID

## 2023-05-31 DIAGNOSIS — M25.511 CHRONIC RIGHT SHOULDER PAIN: Primary | ICD-10-CM

## 2023-05-31 DIAGNOSIS — M62.81 MUSCLE WEAKNESS OF RIGHT UPPER EXTREMITY: ICD-10-CM

## 2023-05-31 DIAGNOSIS — M19.011 ARTHRITIS OF RIGHT ACROMIOCLAVICULAR JOINT: ICD-10-CM

## 2023-05-31 DIAGNOSIS — G89.29 CHRONIC RIGHT SHOULDER PAIN: Primary | ICD-10-CM

## 2023-05-31 PROCEDURE — 97110 THERAPEUTIC EXERCISES: CPT | Mod: PN

## 2023-05-31 PROCEDURE — 97140 MANUAL THERAPY 1/> REGIONS: CPT | Mod: PN

## 2023-06-01 NOTE — PROGRESS NOTES
[facility]   Physical Therapy Treatment Note      Name: Navjot Segovia  Clinic Number: [id number]    Therapy Diagnosis:   Encounter Diagnoses   Name Primary?    Chronic right shoulder pain Yes    Muscle weakness of right upper extremity     Arthritis of right acromioclavicular joint      Physician: Henrik Vazquez MD    Visit Date: 5/31/2023    Physician Orders: PT Eval and Treat   Medical Diagnosis from Referral: Arthritis of Right AC joint  Evaluation Date: 5/26/2023  Authorization Period Expiration: 10/30/2023  Plan of Care Expiration: 08/30/2023  Progress Note Due: 06/30/2023  Visit # / Visits authorized:2/ 20   FOTO: 1/5    PTA Visit #: 0/5     Time In: 03:00 PM  Time Out: 03:45 PM  Total Billable Time: 45 minutes (1:1 PT 1MT,1TE)    Subjective     Pt reports: 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 She states she was holding her mother up with 90 degrees shoulder flexion and her mom was not close to her body. She reports that after getting her up, she felt a sharp pain through her shoulder and back.     She was compliant with home exercise program.  Response to previous treatment: Increased ROM  Functional change: No change    Pain: 4/10  Location: right shoulder      Objective      Objective Measures updated at progress report unless specified.     Treatment     Real received the treatments listed below:      manual therapy techniques: Spinal manipulation were applied to the cervical (C3-C7) and thoracic (T5-T11) spine to address hypomobility and poor muscle activation for 15 minutes.     therapeutic exercises to develop strength for 30 minutes including:  Prone scapular flexion 3 x10  Prone shoulder flexion (end range) 3x10  Prone scaption 3x10       Patient Education and Home Exercises       Education provided:   - Instructed on proper techniques for lifting and supporting her mom during transfers to prevent injuring herself.      Written Home Exercises Provided: Patient instructed to cont prior HEP. Exercises were reviewed and Real was able to demonstrate them prior to the end of the session.  Real demonstrated good  understanding of the education provided. See EMR under Patient Instructions for exercises provided during therapy sessions    Assessment     Patient began treatment complaining of scapular pain and returning shoulder irritation. She underwent Grade V spinal manipulation to address thoracic pain and restricted functional range of motion. Patient reported immediate relief after manipulation. We then completed another Grade V spinal manipulation to the cervical spine to address EROM limitations of the shoulder in flexion/abduction. Patient demonstrated full, pain free active range of motion after cervical manipulation.       Real Is progressing well towards her goals.   Pt prognosis is Good.     Pt will continue to benefit from skilled outpatient physical therapy to address the deficits listed in the problem list box on initial evaluation, provide pt/family education and to maximize pt's level of independence in the home and community environment.     Pt's spiritual, cultural and educational needs considered and pt agreeable to plan of care and goals.     Anticipated barriers to physical therapy: None    Goals:   Short Term Goals (4 Weeks):   1) 1. Pt will be compliant with initial HEP to supplement PT in decreasing pain with functional mobility.  2) Pt will improve in active range of motion abduction demonstrated full abduction in standing.   3) Pt will improve scapular strength demonstrated by ability to complete prone scapular series (Is,Ys,Ts) with 3# x 10 each direction.  4) Pt will improve in posterior muscle activation demonstrated by scapular retraction with 10# cable pulley x 10 without pain or limitation.     Long Term Goals (8 Weeks):  1) Pt will be compliant with final HEP to reduce risk of further injury after  dc  2) Pt will demonstrate ability to complete 15 pushups with proper form and no signs of pain.   3) Pt will demonstrate ability to complete cable column D2 flexion x 10# x 15 reps without poor form or pain limitation.   4) Pt will report 0/10 shoulder pain while sleeping at night.          Plan     Continue current Plan of Care.    Cyndy Mccain, PT, DPT

## 2023-06-07 ENCOUNTER — CLINICAL SUPPORT (OUTPATIENT)
Dept: REHABILITATION | Facility: HOSPITAL | Age: 48
End: 2023-06-07
Attending: INTERNAL MEDICINE
Payer: MEDICAID

## 2023-06-07 DIAGNOSIS — M25.511 CHRONIC RIGHT SHOULDER PAIN: Primary | ICD-10-CM

## 2023-06-07 DIAGNOSIS — G89.29 CHRONIC RIGHT SHOULDER PAIN: Primary | ICD-10-CM

## 2023-06-07 DIAGNOSIS — M19.011 ARTHRITIS OF RIGHT ACROMIOCLAVICULAR JOINT: ICD-10-CM

## 2023-06-07 DIAGNOSIS — M62.81 MUSCLE WEAKNESS OF RIGHT UPPER EXTREMITY: ICD-10-CM

## 2023-06-07 PROCEDURE — 97140 MANUAL THERAPY 1/> REGIONS: CPT | Mod: PN

## 2023-06-07 PROCEDURE — 97110 THERAPEUTIC EXERCISES: CPT | Mod: PN

## 2023-06-07 NOTE — PROGRESS NOTES
[facility]   Physical Therapy Treatment Note      Name: Navjot Segovia  Clinic Number: [id number]    Therapy Diagnosis:   Encounter Diagnoses   Name Primary?    Chronic right shoulder pain Yes    Muscle weakness of right upper extremity     Arthritis of right acromioclavicular joint        Physician: Henrik Vazquez MD    Visit Date: 6/7/2023    Physician Orders: PT Eval and Treat   Medical Diagnosis from Referral: Arthritis of Right AC joint  Evaluation Date: 5/26/2023  Authorization Period Expiration: 10/30/2023  Plan of Care Expiration: 08/30/2023  Progress Note Due: 06/30/2023  Visit # / Visits authorized:3/ 20   FOTO: 1/5    PTA Visit #: 0/5     Time In: 12:30 PM  Time Out: 1:15 PM  Total Billable Time: 45 minutes (1 MT, 2 TE)    Subjective     Pt reports: her right shoulder irritation has settled from incident with her mother. She reports Home exercise program compliance and returning to the gym completing light exercises only. She states that she has to really think about her posture and muscle activation when lifting to activate and use her back instead of just shoulders. P    She was compliant with home exercise program.  Response to previous treatment: Increased ROM  Functional change: No change    Pain: 4/10  Location: right shoulder      Objective      Objective Measures updated at progress report unless specified.     Treatment     Real received the treatments listed below:      manual therapy techniques: Spinal manipulation were applied to the cervical (C3-C7) and thoracic (T5-T11) spine to address hypomobility and poor muscle activation for 15 minutes.     therapeutic exercises to develop strength for 30 minutes including:  Prone scapular flexion 3 x10 2#  Prone shoulder flexion (end range) 3x10 2#  Prone scaption 3x10 2#  Scapular Punches x30 #4  Supine Shoulder internal rotation/external rotation @ 90 deg 3x10 4#  Single Arm Row 10# 2x10 bilat  Single Arm Chest Press 10#  2x10 bilat      Patient Education and Home Exercises       Education provided:   - Instructed on proper techniques for lifting and supporting her mom during transfers to prevent injuring herself.     Written Home Exercises Provided: Patient instructed to cont prior HEP. Exercises were reviewed and Navjot was able to demonstrate them prior to the end of the session.  Real demonstrated good  understanding of the education provided. See EMR under Patient Instructions for exercises provided during therapy sessions    Assessment     Patient began treatment complaining of scapular pain and returning shoulder irritation. She underwent Grade V spinal manipulation to address thoracic pain and restricted functional range of motion. Patient reported immediate relief after manipulation. We then completed another Grade V spinal manipulation to the cervical spine to address EROM limitations of the shoulder in flexion/abduction. Patient demonstrated full, pain free active range of motion after cervical manipulation.       Real Is progressing well towards her goals.   Pt prognosis is Good.     Pt will continue to benefit from skilled outpatient physical therapy to address the deficits listed in the problem list box on initial evaluation, provide pt/family education and to maximize pt's level of independence in the home and community environment.     Pt's spiritual, cultural and educational needs considered and pt agreeable to plan of care and goals.     Anticipated barriers to physical therapy: None    Goals:   Short Term Goals (4 Weeks):   1) 1. Pt will be compliant with initial HEP to supplement PT in decreasing pain with functional mobility.  2) Pt will improve in active range of motion abduction demonstrated full abduction in standing.   3) Pt will improve scapular strength demonstrated by ability to complete prone scapular series (Is,Ys,Ts) with 3# x 10 each direction.  4) Pt will improve in posterior muscle activation  demonstrated by scapular retraction with 10# cable pulley x 10 without pain or limitation.     Long Term Goals (8 Weeks):  1) Pt will be compliant with final HEP to reduce risk of further injury after dc  2) Pt will demonstrate ability to complete 15 pushups with proper form and no signs of pain.   3) Pt will demonstrate ability to complete cable column D2 flexion x 10# x 15 reps without poor form or pain limitation.   4) Pt will report 0/10 shoulder pain while sleeping at night.          Plan     Continue current Plan of Care.    Cyndy Mccain, PT, DPT

## 2023-06-14 ENCOUNTER — CLINICAL SUPPORT (OUTPATIENT)
Dept: REHABILITATION | Facility: HOSPITAL | Age: 48
End: 2023-06-14
Attending: INTERNAL MEDICINE
Payer: MEDICAID

## 2023-06-14 DIAGNOSIS — M25.511 CHRONIC RIGHT SHOULDER PAIN: Primary | ICD-10-CM

## 2023-06-14 DIAGNOSIS — M62.81 MUSCLE WEAKNESS OF RIGHT UPPER EXTREMITY: ICD-10-CM

## 2023-06-14 DIAGNOSIS — G89.29 CHRONIC RIGHT SHOULDER PAIN: Primary | ICD-10-CM

## 2023-06-14 DIAGNOSIS — M19.011 ARTHRITIS OF RIGHT ACROMIOCLAVICULAR JOINT: ICD-10-CM

## 2023-06-14 PROCEDURE — 97140 MANUAL THERAPY 1/> REGIONS: CPT | Mod: PN

## 2023-06-14 NOTE — PROGRESS NOTES
[facility]   Physical Therapy Treatment Note      Name: Navjot Segovia  Clinic Number: [id number]    Therapy Diagnosis:   Encounter Diagnoses   Name Primary?    Chronic right shoulder pain Yes    Muscle weakness of right upper extremity     Arthritis of right acromioclavicular joint          Physician: Henrik Vazquez MD    Visit Date: 6/14/2023    Physician Orders: PT Eval and Treat   Medical Diagnosis from Referral: Arthritis of Right AC joint  Evaluation Date: 5/26/2023  Authorization Period Expiration: 10/30/2023  Plan of Care Expiration: 08/30/2023  Progress Note Due: 06/30/2023  Visit # / Visits authorized:3/ 20   FOTO: 1/5    PTA Visit #: 0/5     Time In: 12:30 PM  Time Out: 1:00 PM  Total Billable Time: 30 minutes (2MT)    Subjective     Pt reports: right shoulder stiffness and pain with exercise and household tasks.    She was compliant with home exercise program.  Response to previous treatment: Increased ROM  Functional change: No change    Pain: 4/10  Location: right shoulder      Objective      Objective Measures updated at progress report unless specified.     Treatment     Real received the treatments listed below:      manual therapy techniques (30 mins):  Dry needling was completed to increase joint mobility and release specific muscle tightness that affects right shoulder mobility. Muscles targeted: infraspinatus, supraspinatus, latissimus dorsi insertion, posterior deltoid and upper trap. (15 min)  STM to right shoulder complex - 15 min     therapeutic exercises to develop strength for 00 minutes including:  Not performed today:  Prone scapular flexion 3 x10 2#  Prone shoulder flexion (end range) 3x10 2#  Prone scaption 3x10 2#  Scapular Punches x30 #4  Supine Shoulder internal rotation/external rotation @ 90 deg 3x10 4#  Single Arm Row 10# 2x10 bilat  Single Arm Chest Press 10# 2x10 bilat      Patient Education and Home Exercises       Education provided:   -  Instructed on proper techniques for lifting and supporting her mom during transfers to prevent injuring herself.     Written Home Exercises Provided: Patient instructed to cont prior HEP. Exercises were reviewed and Navjot was able to demonstrate them prior to the end of the session.  Real demonstrated good  understanding of the education provided. See EMR under Patient Instructions for exercises provided during therapy sessions    Assessment     Patient requested Right shoulder dry needling after doing research and feeling more comfortable with the procedure. Patient was re-educated on how dry needling can assist her specific limitations. Patient reported uncomfortability during treatment, but experienced immediate results post treatment. Patient reported decreased joint tightness and increased fluidity of movement. She has agreed to complete dry needling 1x/week for the next 2 weeks to achieve best results. Patient continues to be active with Home exercise program and gym routine.       Real Is progressing well towards her goals.   Pt prognosis is Good.     Pt will continue to benefit from skilled outpatient physical therapy to address the deficits listed in the problem list box on initial evaluation, provide pt/family education and to maximize pt's level of independence in the home and community environment.     Pt's spiritual, cultural and educational needs considered and pt agreeable to plan of care and goals.     Anticipated barriers to physical therapy: None    Goals:   Short Term Goals (4 Weeks):   1) 1. Pt will be compliant with initial HEP to supplement PT in decreasing pain with functional mobility.  2) Pt will improve in active range of motion abduction demonstrated full abduction in standing.   3) Pt will improve scapular strength demonstrated by ability to complete prone scapular series (Is,Ys,Ts) with 3# x 10 each direction.  4) Pt will improve in posterior muscle activation demonstrated by scapular  retraction with 10# cable pulley x 10 without pain or limitation.     Long Term Goals (8 Weeks):  1) Pt will be compliant with final HEP to reduce risk of further injury after dc  2) Pt will demonstrate ability to complete 15 pushups with proper form and no signs of pain.   3) Pt will demonstrate ability to complete cable column D2 flexion x 10# x 15 reps without poor form or pain limitation.   4) Pt will report 0/10 shoulder pain while sleeping at night.          Plan     Continue current Plan of Care.    Cyndy Mccain, PT, DPT, DN

## 2023-06-23 ENCOUNTER — CLINICAL SUPPORT (OUTPATIENT)
Dept: REHABILITATION | Facility: HOSPITAL | Age: 48
End: 2023-06-23
Attending: INTERNAL MEDICINE
Payer: MEDICAID

## 2023-06-23 DIAGNOSIS — M25.511 CHRONIC RIGHT SHOULDER PAIN: Primary | ICD-10-CM

## 2023-06-23 DIAGNOSIS — G89.29 CHRONIC RIGHT SHOULDER PAIN: Primary | ICD-10-CM

## 2023-06-23 DIAGNOSIS — M62.81 MUSCLE WEAKNESS OF RIGHT UPPER EXTREMITY: ICD-10-CM

## 2023-06-23 DIAGNOSIS — M19.011 ARTHRITIS OF RIGHT ACROMIOCLAVICULAR JOINT: ICD-10-CM

## 2023-06-23 PROCEDURE — 97140 MANUAL THERAPY 1/> REGIONS: CPT | Mod: PN

## 2023-06-23 NOTE — PROGRESS NOTES
[facility]   Physical Therapy Treatment Note      Name: Navjot Segovia  Clinic Number: [id number]    Therapy Diagnosis:   Encounter Diagnoses   Name Primary?    Chronic right shoulder pain Yes    Muscle weakness of right upper extremity     Arthritis of right acromioclavicular joint        Physician: Henrik Vazquez MD    Visit Date: 6/23/2023    Physician Orders: PT Eval and Treat   Medical Diagnosis from Referral: Arthritis of Right AC joint  Evaluation Date: 5/26/2023  Authorization Period Expiration: 10/30/2023  Plan of Care Expiration: 08/30/2023  Progress Note Due: 06/30/2023  Visit # / Visits authorized:3/ 20   FOTO: 1/5    PTA Visit #: 0/5     Time In: 12:00 PM  Time Out: 12:30 PM  Total Billable Time: 30 minutes (2MT)    Subjective     Pt reports: Patient reports that previous dry needling session was very beneficial and looking forward to getting again today.     She was compliant with home exercise program.  Response to previous treatment: Increased ROM  Functional change: No change    Pain: 4/10  Location: right shoulder      Objective      Objective Measures updated at progress report unless specified.     Treatment     Real received the treatments listed below:      manual therapy techniques (30 mins):  Dry needling was completed to increase joint mobility and release specific muscle tightness that affects right shoulder mobility. Muscles targeted: thoracic paraspinals @ T4-T7, supraspinatus, latissimus dorsi insertion, posterior deltoid and upper trap. (15 min)  STM to right shoulder complex - 15 min     therapeutic exercises to develop strength for 00 minutes including:  Not performed today:  Prone scapular flexion 3 x10 2#  Prone shoulder flexion (end range) 3x10 2#  Prone scaption 3x10 2#  Scapular Punches x30 #4  Supine Shoulder internal rotation/external rotation @ 90 deg 3x10 4#  Single Arm Row 10# 2x10 bilat  Single Arm Chest Press 10# 2x10 bilat      Patient  Education and Home Exercises       Education provided:   - Instructed on proper techniques for lifting and supporting her mom during transfers to prevent injuring herself.     Written Home Exercises Provided: Patient instructed to cont prior HEP. Exercises were reviewed and Navjot was able to demonstrate them prior to the end of the session.  Real demonstrated good  understanding of the education provided. See EMR under Patient Instructions for exercises provided during therapy sessions    Assessment     Patient reported moderate pain with dry needling session today. She said the pain is much more intense than first session. Patient reported reduction of tightness and Right shoulder limitation immediately after treatment.       Real Is progressing well towards her goals.   Pt prognosis is Good.     Pt will continue to benefit from skilled outpatient physical therapy to address the deficits listed in the problem list box on initial evaluation, provide pt/family education and to maximize pt's level of independence in the home and community environment.     Pt's spiritual, cultural and educational needs considered and pt agreeable to plan of care and goals.     Anticipated barriers to physical therapy: None    Goals:   Short Term Goals (4 Weeks):   1) 1. Pt will be compliant with initial HEP to supplement PT in decreasing pain with functional mobility.  2) Pt will improve in active range of motion abduction demonstrated full abduction in standing.   3) Pt will improve scapular strength demonstrated by ability to complete prone scapular series (Is,Ys,Ts) with 3# x 10 each direction.  4) Pt will improve in posterior muscle activation demonstrated by scapular retraction with 10# cable pulley x 10 without pain or limitation.     Long Term Goals (8 Weeks):  1) Pt will be compliant with final HEP to reduce risk of further injury after dc  2) Pt will demonstrate ability to complete 15 pushups with proper form and no signs of  pain.   3) Pt will demonstrate ability to complete cable column D2 flexion x 10# x 15 reps without poor form or pain limitation.   4) Pt will report 0/10 shoulder pain while sleeping at night.          Plan     Continue current Plan of Care.    Cyndy Mccain, PT, DPT, DN

## 2023-07-31 ENCOUNTER — LAB VISIT (OUTPATIENT)
Dept: LAB | Facility: HOSPITAL | Age: 48
End: 2023-07-31
Attending: INTERNAL MEDICINE
Payer: MEDICAID

## 2023-07-31 DIAGNOSIS — I10 ESSENTIAL HYPERTENSION: ICD-10-CM

## 2023-07-31 LAB
ANION GAP SERPL CALC-SCNC: 9 MMOL/L (ref 8–16)
BASOPHILS # BLD AUTO: 0.02 K/UL (ref 0–0.2)
BASOPHILS NFR BLD: 0.7 % (ref 0–1.9)
BUN SERPL-MCNC: 15 MG/DL (ref 6–20)
CALCIUM SERPL-MCNC: 9.2 MG/DL (ref 8.7–10.5)
CHLORIDE SERPL-SCNC: 103 MMOL/L (ref 95–110)
CO2 SERPL-SCNC: 29 MMOL/L (ref 23–29)
CREAT SERPL-MCNC: 0.9 MG/DL (ref 0.5–1.4)
DIFFERENTIAL METHOD: ABNORMAL
EOSINOPHIL # BLD AUTO: 0.1 K/UL (ref 0–0.5)
EOSINOPHIL NFR BLD: 1.7 % (ref 0–8)
ERYTHROCYTE [DISTWIDTH] IN BLOOD BY AUTOMATED COUNT: 12.8 % (ref 11.5–14.5)
EST. GFR  (NO RACE VARIABLE): >60 ML/MIN/1.73 M^2
GLUCOSE SERPL-MCNC: 84 MG/DL (ref 70–110)
HCT VFR BLD AUTO: 40.8 % (ref 37–48.5)
HGB BLD-MCNC: 14.5 G/DL (ref 12–16)
IMM GRANULOCYTES # BLD AUTO: 0 K/UL (ref 0–0.04)
IMM GRANULOCYTES NFR BLD AUTO: 0 % (ref 0–0.5)
LYMPHOCYTES # BLD AUTO: 1.1 K/UL (ref 1–4.8)
LYMPHOCYTES NFR BLD: 37 % (ref 18–48)
MCH RBC QN AUTO: 31.8 PG (ref 27–31)
MCHC RBC AUTO-ENTMCNC: 35.5 G/DL (ref 32–36)
MCV RBC AUTO: 90 FL (ref 82–98)
MONOCYTES # BLD AUTO: 0.3 K/UL (ref 0.3–1)
MONOCYTES NFR BLD: 10.6 % (ref 4–15)
NEUTROPHILS # BLD AUTO: 1.5 K/UL (ref 1.8–7.7)
NEUTROPHILS NFR BLD: 50 % (ref 38–73)
NRBC BLD-RTO: 0 /100 WBC
PLATELET # BLD AUTO: 295 K/UL (ref 150–450)
PMV BLD AUTO: 11.2 FL (ref 9.2–12.9)
POTASSIUM SERPL-SCNC: 3.3 MMOL/L (ref 3.5–5.1)
RBC # BLD AUTO: 4.56 M/UL (ref 4–5.4)
SODIUM SERPL-SCNC: 141 MMOL/L (ref 136–145)
WBC # BLD AUTO: 2.92 K/UL (ref 3.9–12.7)

## 2023-07-31 PROCEDURE — 36415 COLL VENOUS BLD VENIPUNCTURE: CPT | Mod: PO | Performed by: INTERNAL MEDICINE

## 2023-07-31 PROCEDURE — 80048 BASIC METABOLIC PNL TOTAL CA: CPT | Performed by: INTERNAL MEDICINE

## 2023-07-31 PROCEDURE — 85025 COMPLETE CBC W/AUTO DIFF WBC: CPT | Performed by: INTERNAL MEDICINE

## 2023-08-01 ENCOUNTER — OFFICE VISIT (OUTPATIENT)
Dept: FAMILY MEDICINE | Facility: CLINIC | Age: 48
End: 2023-08-01
Payer: MEDICAID

## 2023-08-01 VITALS
BODY MASS INDEX: 26.93 KG/M2 | HEART RATE: 69 BPM | HEIGHT: 66 IN | DIASTOLIC BLOOD PRESSURE: 116 MMHG | TEMPERATURE: 99 F | SYSTOLIC BLOOD PRESSURE: 188 MMHG | WEIGHT: 167.56 LBS | OXYGEN SATURATION: 99 %

## 2023-08-01 DIAGNOSIS — I10 ESSENTIAL HYPERTENSION: ICD-10-CM

## 2023-08-01 DIAGNOSIS — F33.1 MODERATE EPISODE OF RECURRENT MAJOR DEPRESSIVE DISORDER: ICD-10-CM

## 2023-08-01 DIAGNOSIS — I10 ESSENTIAL HYPERTENSION: Primary | ICD-10-CM

## 2023-08-01 PROCEDURE — 4010F PR ACE/ARB THEARPY RXD/TAKEN: ICD-10-PCS | Mod: CPTII,,, | Performed by: INTERNAL MEDICINE

## 2023-08-01 PROCEDURE — 1160F PR REVIEW ALL MEDS BY PRESCRIBER/CLIN PHARMACIST DOCUMENTED: ICD-10-PCS | Mod: CPTII,,, | Performed by: INTERNAL MEDICINE

## 2023-08-01 PROCEDURE — 4010F ACE/ARB THERAPY RXD/TAKEN: CPT | Mod: CPTII,,, | Performed by: INTERNAL MEDICINE

## 2023-08-01 PROCEDURE — 1159F PR MEDICATION LIST DOCUMENTED IN MEDICAL RECORD: ICD-10-PCS | Mod: CPTII,,, | Performed by: INTERNAL MEDICINE

## 2023-08-01 PROCEDURE — 99214 OFFICE O/P EST MOD 30 MIN: CPT | Mod: S$PBB,,, | Performed by: INTERNAL MEDICINE

## 2023-08-01 PROCEDURE — 99214 OFFICE O/P EST MOD 30 MIN: CPT | Mod: PBBFAC,PN | Performed by: INTERNAL MEDICINE

## 2023-08-01 PROCEDURE — 3077F PR MOST RECENT SYSTOLIC BLOOD PRESSURE >= 140 MM HG: ICD-10-PCS | Mod: CPTII,,, | Performed by: INTERNAL MEDICINE

## 2023-08-01 PROCEDURE — 3008F BODY MASS INDEX DOCD: CPT | Mod: CPTII,,, | Performed by: INTERNAL MEDICINE

## 2023-08-01 PROCEDURE — 3080F DIAST BP >= 90 MM HG: CPT | Mod: CPTII,,, | Performed by: INTERNAL MEDICINE

## 2023-08-01 PROCEDURE — 3080F PR MOST RECENT DIASTOLIC BLOOD PRESSURE >= 90 MM HG: ICD-10-PCS | Mod: CPTII,,, | Performed by: INTERNAL MEDICINE

## 2023-08-01 PROCEDURE — 1160F RVW MEDS BY RX/DR IN RCRD: CPT | Mod: CPTII,,, | Performed by: INTERNAL MEDICINE

## 2023-08-01 PROCEDURE — 3077F SYST BP >= 140 MM HG: CPT | Mod: CPTII,,, | Performed by: INTERNAL MEDICINE

## 2023-08-01 PROCEDURE — 99214 PR OFFICE/OUTPT VISIT, EST, LEVL IV, 30-39 MIN: ICD-10-PCS | Mod: S$PBB,,, | Performed by: INTERNAL MEDICINE

## 2023-08-01 PROCEDURE — 1159F MED LIST DOCD IN RCRD: CPT | Mod: CPTII,,, | Performed by: INTERNAL MEDICINE

## 2023-08-01 PROCEDURE — 3008F PR BODY MASS INDEX (BMI) DOCUMENTED: ICD-10-PCS | Mod: CPTII,,, | Performed by: INTERNAL MEDICINE

## 2023-08-01 PROCEDURE — 99999 PR PBB SHADOW E&M-EST. PATIENT-LVL IV: ICD-10-PCS | Mod: PBBFAC,,, | Performed by: INTERNAL MEDICINE

## 2023-08-01 PROCEDURE — 99999 PR PBB SHADOW E&M-EST. PATIENT-LVL IV: CPT | Mod: PBBFAC,,, | Performed by: INTERNAL MEDICINE

## 2023-08-01 RX ORDER — CITALOPRAM 40 MG/1
40 TABLET, FILM COATED ORAL DAILY
Qty: 30 TABLET | Refills: 1 | Status: SHIPPED | OUTPATIENT
Start: 2023-08-01

## 2023-08-01 RX ORDER — SPIRONOLACTONE 50 MG/1
50 TABLET, FILM COATED ORAL
Qty: 90 TABLET | OUTPATIENT
Start: 2023-08-01

## 2023-08-01 RX ORDER — SPIRONOLACTONE 50 MG/1
50 TABLET, FILM COATED ORAL DAILY
Qty: 30 TABLET | Refills: 1 | Status: SHIPPED | OUTPATIENT
Start: 2023-08-01 | End: 2024-07-31

## 2023-08-01 NOTE — TELEPHONE ENCOUNTER
No care due was identified.  [facility] Embedded Care Due Messages. Reference number: [id number].   8/01/2023 9:21:57 AM CDT

## 2023-08-01 NOTE — TELEPHONE ENCOUNTER
Refill Decision Note   Navjot Segovia  is requesting a refill authorization.  Brief Assessment and Rationale for Refill:  Quick Discontinue     Medication Therapy Plan:  Receipt confirmed by pharmacy (8/1/2023  9:11 AM CDT)      Comments:     Note composed:11:38 AM 08/01/2023

## 2023-08-01 NOTE — PROGRESS NOTES
"Subjective:       Patient ID: Navjot Segovia is a 48 y.o. female.    Chief Complaint: Follow-up (4 month), Fatigue, Dizziness, and Menopause (pre)    F/u HTN discuss fatigue, low mood    HPI: 49 y/o w/ HTN presents alone for scheduled follow up for last two to four weeks feeling more tired, increase anhedonia bitemporal headache no motor weakness occasionaly with sensation of feeling "off balance" no vertigo no relation to change in position no change in bowel or bladder frequency (chronic constipation) no melena or BRBPR. No abdominal pain. Her mother has advanced parkinson's and she has been spending more time caring for her. This has been stressful for her to see her decline. She has been takign 20mg fluoxetine daily (on same dose > 2 years)      Review of Systems   Constitutional:  Positive for fatigue. Negative for activity change, appetite change, fever and unexpected weight change.   HENT:  Negative for ear pain, rhinorrhea and sore throat.    Eyes:  Negative for discharge and visual disturbance.   Respiratory:  Negative for chest tightness, shortness of breath and wheezing.    Cardiovascular:  Negative for chest pain, palpitations and leg swelling.   Gastrointestinal:  Positive for constipation. Negative for abdominal pain and diarrhea.   Endocrine: Negative for cold intolerance and heat intolerance.   Genitourinary:  Negative for dysuria and hematuria.   Musculoskeletal:  Negative for joint swelling and neck stiffness.   Skin:  Negative for rash.   Neurological:  Positive for light-headedness. Negative for dizziness, syncope, weakness and headaches.   Psychiatric/Behavioral:  Negative for suicidal ideas.        Objective:     Vitals:    08/01/23 0849   BP: (!) 188/116   BP Location: Left arm   Patient Position: Sitting   BP Method: Large (Manual)   Pulse: 69   Temp: 98.5 °F (36.9 °C)   TempSrc: Oral   SpO2: 99%   Weight: 76 kg (167 lb 8.8 oz)   Height: 5' 6" (1.676 m)          Physical Exam  Constitutional:  "      Appearance: She is well-developed.   HENT:      Head: Normocephalic and atraumatic.   Eyes:      General: No scleral icterus.     Extraocular Movements: Extraocular movements intact.      Conjunctiva/sclera: Conjunctivae normal.      Pupils: Pupils are equal, round, and reactive to light.   Cardiovascular:      Rate and Rhythm: Normal rate and regular rhythm.      Heart sounds: No murmur heard.     No friction rub. No gallop.   Pulmonary:      Effort: Pulmonary effort is normal.      Breath sounds: Normal breath sounds. No wheezing or rales.   Abdominal:      Palpations: Abdomen is soft.      Tenderness: There is no abdominal tenderness. There is no guarding or rebound.   Musculoskeletal:         General: No tenderness. Normal range of motion.      Cervical back: Normal range of motion.      Right lower leg: No edema.      Left lower leg: No edema.   Skin:     General: Skin is warm and dry.   Neurological:      Mental Status: She is alert and oriented to person, place, and time.      Cranial Nerves: No cranial nerve deficit.      Comments: Symmetric face negative pronator drift normal gait observed          Assessment and Plan   1. Essential hypertension  Bp well above goal even on recheck given low heart rate avoiding beta blocker start aldactone bp check with labs to monitor potassium in two weeks   - spironolactone (ALDACTONE) 50 MG tablet; Take 1 tablet (50 mg total) by mouth once daily.  Dispense: 30 tablet; Refill: 1  - BASIC METABOLIC PANEL; Future    2. Moderate episode of recurrent major depressive disorder  Switch to citalopram follow up for effectiveness in one month  - citalopram (CELEXA) 40 MG tablet; Take 1 tablet (40 mg total) by mouth once daily.  Dispense: 30 tablet; Refill: 1

## 2023-10-04 ENCOUNTER — HOSPITAL ENCOUNTER (EMERGENCY)
Facility: HOSPITAL | Age: 48
Discharge: HOME | End: 2023-10-04
Attending: EMERGENCY MEDICINE
Payer: MEDICAID

## 2023-10-04 VITALS
RESPIRATION RATE: 16 BRPM | OXYGEN SATURATION: 93 % | HEART RATE: 86 BPM | SYSTOLIC BLOOD PRESSURE: 158 MMHG | TEMPERATURE: 99 F | DIASTOLIC BLOOD PRESSURE: 86 MMHG | BODY MASS INDEX: 26.95 KG/M2 | WEIGHT: 167 LBS

## 2023-10-04 DIAGNOSIS — N28.1 KIDNEY CYSTS: Primary | ICD-10-CM

## 2023-10-04 DIAGNOSIS — N30.00 ACUTE CYSTITIS WITHOUT HEMATURIA: ICD-10-CM

## 2023-10-04 DIAGNOSIS — K57.90 DIVERTICULOSIS: ICD-10-CM

## 2023-10-04 LAB
ALBUMIN SERPL BCP-MCNC: 4.1 G/DL (ref 3.5–5.2)
ALP SERPL-CCNC: 34 U/L (ref 55–135)
ALT SERPL W/O P-5'-P-CCNC: 17 U/L (ref 10–44)
ANION GAP SERPL CALC-SCNC: 11 MMOL/L (ref 8–16)
AST SERPL-CCNC: 19 U/L (ref 10–40)
B-HCG UR QL: NEGATIVE
BACTERIA #/AREA URNS AUTO: ABNORMAL /HPF
BACTERIA GENITAL QL WET PREP: ABNORMAL
BASOPHILS # BLD AUTO: 0.02 K/UL (ref 0–0.2)
BASOPHILS NFR BLD: 0.3 % (ref 0–1.9)
BILIRUB SERPL-MCNC: 0.9 MG/DL (ref 0.1–1)
BILIRUB UR QL STRIP: NEGATIVE
BUN SERPL-MCNC: 9 MG/DL (ref 6–20)
CALCIUM SERPL-MCNC: 9.5 MG/DL (ref 8.7–10.5)
CHLORIDE SERPL-SCNC: 100 MMOL/L (ref 95–110)
CLARITY UR REFRACT.AUTO: ABNORMAL
CLUE CELLS VAG QL WET PREP: ABNORMAL
CO2 SERPL-SCNC: 25 MMOL/L (ref 23–29)
COLOR UR AUTO: YELLOW
CREAT SERPL-MCNC: 1 MG/DL (ref 0.5–1.4)
CTP QC/QA: YES
DIFFERENTIAL METHOD: ABNORMAL
EOSINOPHIL # BLD AUTO: 0 K/UL (ref 0–0.5)
EOSINOPHIL NFR BLD: 0 % (ref 0–8)
ERYTHROCYTE [DISTWIDTH] IN BLOOD BY AUTOMATED COUNT: 12.7 % (ref 11.5–14.5)
EST. GFR  (NO RACE VARIABLE): >60 ML/MIN/1.73 M^2
FILAMENT FUNGI VAG WET PREP-#/AREA: ABNORMAL
GLUCOSE SERPL-MCNC: 91 MG/DL (ref 70–110)
GLUCOSE UR QL STRIP: NEGATIVE
HCT VFR BLD AUTO: 38.7 % (ref 37–48.5)
HGB BLD-MCNC: 13.7 G/DL (ref 12–16)
HGB UR QL STRIP: NEGATIVE
HYALINE CASTS UR QL AUTO: 0 /LPF
IMM GRANULOCYTES # BLD AUTO: 0.02 K/UL (ref 0–0.04)
IMM GRANULOCYTES NFR BLD AUTO: 0.3 % (ref 0–0.5)
KETONES UR QL STRIP: NEGATIVE
LEUKOCYTE ESTERASE UR QL STRIP: ABNORMAL
LIPASE SERPL-CCNC: 18 U/L (ref 4–60)
LYMPHOCYTES # BLD AUTO: 0.4 K/UL (ref 1–4.8)
LYMPHOCYTES NFR BLD: 6.5 % (ref 18–48)
MCH RBC QN AUTO: 32.5 PG (ref 27–31)
MCHC RBC AUTO-ENTMCNC: 35.4 G/DL (ref 32–36)
MCV RBC AUTO: 92 FL (ref 82–98)
MICROSCOPIC COMMENT: ABNORMAL
MONOCYTES # BLD AUTO: 0.6 K/UL (ref 0.3–1)
MONOCYTES NFR BLD: 9.5 % (ref 4–15)
NEUTROPHILS # BLD AUTO: 5.2 K/UL (ref 1.8–7.7)
NEUTROPHILS NFR BLD: 83.4 % (ref 38–73)
NITRITE UR QL STRIP: NEGATIVE
NRBC BLD-RTO: 0 /100 WBC
PH UR STRIP: 7 [PH] (ref 5–8)
PLATELET # BLD AUTO: 289 K/UL (ref 150–450)
PMV BLD AUTO: 10.2 FL (ref 9.2–12.9)
POTASSIUM SERPL-SCNC: 3.1 MMOL/L (ref 3.5–5.1)
PROT SERPL-MCNC: 7.6 G/DL (ref 6–8.4)
PROT UR QL STRIP: ABNORMAL
RBC # BLD AUTO: 4.21 M/UL (ref 4–5.4)
RBC #/AREA URNS AUTO: 3 /HPF (ref 0–4)
SODIUM SERPL-SCNC: 136 MMOL/L (ref 136–145)
SP GR UR STRIP: 1.01 (ref 1–1.03)
SPECIMEN SOURCE: ABNORMAL
SQUAMOUS #/AREA URNS AUTO: 6 /HPF
T VAGINALIS GENITAL QL WET PREP: ABNORMAL
URN SPEC COLLECT METH UR: ABNORMAL
WBC # BLD AUTO: 6.29 K/UL (ref 3.9–12.7)
WBC #/AREA URNS AUTO: >100 /HPF (ref 0–5)
WBC #/AREA VAG WET PREP: ABNORMAL
YEAST GENITAL QL WET PREP: ABNORMAL
YEAST UR QL AUTO: ABNORMAL

## 2023-10-04 PROCEDURE — 87088 URINE BACTERIA CULTURE: CPT | Performed by: EMERGENCY MEDICINE

## 2023-10-04 PROCEDURE — 87077 CULTURE AEROBIC IDENTIFY: CPT | Performed by: EMERGENCY MEDICINE

## 2023-10-04 PROCEDURE — 25000003 PHARM REV CODE 250: Performed by: EMERGENCY MEDICINE

## 2023-10-04 PROCEDURE — 63600175 PHARM REV CODE 636 W HCPCS: Performed by: EMERGENCY MEDICINE

## 2023-10-04 PROCEDURE — 96361 HYDRATE IV INFUSION ADD-ON: CPT

## 2023-10-04 PROCEDURE — 83690 ASSAY OF LIPASE: CPT | Performed by: EMERGENCY MEDICINE

## 2023-10-04 PROCEDURE — 80053 COMPREHEN METABOLIC PANEL: CPT | Performed by: EMERGENCY MEDICINE

## 2023-10-04 PROCEDURE — 99285 EMERGENCY DEPT VISIT HI MDM: CPT | Mod: 25

## 2023-10-04 PROCEDURE — 81025 URINE PREGNANCY TEST: CPT | Performed by: EMERGENCY MEDICINE

## 2023-10-04 PROCEDURE — 96375 TX/PRO/DX INJ NEW DRUG ADDON: CPT

## 2023-10-04 PROCEDURE — 85025 COMPLETE CBC W/AUTO DIFF WBC: CPT | Performed by: EMERGENCY MEDICINE

## 2023-10-04 PROCEDURE — 81001 URINALYSIS AUTO W/SCOPE: CPT | Performed by: EMERGENCY MEDICINE

## 2023-10-04 PROCEDURE — 87086 URINE CULTURE/COLONY COUNT: CPT | Performed by: EMERGENCY MEDICINE

## 2023-10-04 PROCEDURE — 96365 THER/PROPH/DIAG IV INF INIT: CPT | Mod: 59

## 2023-10-04 PROCEDURE — 87210 SMEAR WET MOUNT SALINE/INK: CPT | Performed by: EMERGENCY MEDICINE

## 2023-10-04 PROCEDURE — 87186 SC STD MICRODIL/AGAR DIL: CPT | Performed by: EMERGENCY MEDICINE

## 2023-10-04 PROCEDURE — 25500020 PHARM REV CODE 255: Performed by: EMERGENCY MEDICINE

## 2023-10-04 RX ORDER — KETOROLAC TROMETHAMINE 30 MG/ML
10 INJECTION, SOLUTION INTRAMUSCULAR; INTRAVENOUS
Status: COMPLETED | OUTPATIENT
Start: 2023-10-04 | End: 2023-10-04

## 2023-10-04 RX ORDER — PHENAZOPYRIDINE HYDROCHLORIDE 200 MG/1
200 TABLET, FILM COATED ORAL
Qty: 6 TABLET | Refills: 0 | Status: SHIPPED | OUTPATIENT
Start: 2023-10-04 | End: 2023-10-06

## 2023-10-04 RX ORDER — CEPHALEXIN 500 MG/1
500 CAPSULE ORAL EVERY 12 HOURS
Qty: 14 CAPSULE | Refills: 0 | Status: ON HOLD | OUTPATIENT
Start: 2023-10-04 | End: 2023-10-10 | Stop reason: HOSPADM

## 2023-10-04 RX ORDER — ACETAMINOPHEN 500 MG
1000 TABLET ORAL
Status: COMPLETED | OUTPATIENT
Start: 2023-10-04 | End: 2023-10-04

## 2023-10-04 RX ORDER — METRONIDAZOLE 500 MG/1
500 TABLET ORAL 3 TIMES DAILY
Qty: 21 TABLET | Refills: 0 | Status: ON HOLD | OUTPATIENT
Start: 2023-10-04 | End: 2023-10-10 | Stop reason: HOSPADM

## 2023-10-04 RX ORDER — PHENAZOPYRIDINE HYDROCHLORIDE 200 MG/1
200 TABLET, FILM COATED ORAL
Qty: 6 TABLET | Refills: 0 | Status: SHIPPED | OUTPATIENT
Start: 2023-10-04 | End: 2023-10-04 | Stop reason: SDUPTHER

## 2023-10-04 RX ORDER — CEPHALEXIN 500 MG/1
500 CAPSULE ORAL EVERY 12 HOURS
Qty: 14 CAPSULE | Refills: 0 | Status: SHIPPED | OUTPATIENT
Start: 2023-10-04 | End: 2023-10-04 | Stop reason: SDUPTHER

## 2023-10-04 RX ADMIN — SODIUM CHLORIDE 1000 ML: 9 INJECTION, SOLUTION INTRAVENOUS at 11:10

## 2023-10-04 RX ADMIN — KETOROLAC TROMETHAMINE 10 MG: 30 INJECTION INTRAMUSCULAR; INTRAVENOUS at 11:10

## 2023-10-04 RX ADMIN — IOHEXOL 75 ML: 350 INJECTION, SOLUTION INTRAVENOUS at 12:10

## 2023-10-04 RX ADMIN — ACETAMINOPHEN 1000 MG: 500 TABLET ORAL at 01:10

## 2023-10-04 RX ADMIN — CEFTRIAXONE 1 G: 1 INJECTION, POWDER, FOR SOLUTION INTRAMUSCULAR; INTRAVENOUS at 12:10

## 2023-10-04 NOTE — ED PROVIDER NOTES
Encounter Date: 10/4/2023       History     Chief Complaint   Patient presents with    Flank Pain     Back and pelvic pain x 2 weeks associated with chills, painful urination.      Ms. Segovia is a 47 yo woman who presents today with a 2-week history of dysuria, urinary frequency, foul smelling urine, white watery vaginal discharge, as well as postcoital pain and spotting. She also reports a vaginal cyst and swollen inguinal lymph nodes that appeared around the same time. The cyst and lymphadenopathy went away after she used some leftover Bactrim, but she is still having vaginal pain. Patient is up to date with pap smears and saw her OBGYN yesterday who reported a normal pelvic exam and prescribed fluconazole which the patient has not yet started. As of last night, she started having cramping pelvic pain that has now radiated to her right flank and back that is worse with movement and improved with Aleve. This is accompanied by fever, chills, and nausea. She also reports a 4-day history of diarrhea with dark watery stools with no mucus. She currently has her appendix as well as all her reproductive organs. Urinalysis yesterday was normal.       Review of patient's allergies indicates:   Allergen Reactions    Ace inhibitors      Other reaction(s): cough     Past Medical History:   Diagnosis Date    Anxiety     Depression     History of acne     Hx of psychiatric care     Hyperlipidemia     Hypertension     Keloid cicatrix     Psychiatric problem     Psychosis     Sleep difficulties     Therapy      Past Surgical History:   Procedure Laterality Date     SECTION, CLASSIC      x 2    Laparoscopic bilateral tubal ligation.      TONSILLECTOMY       Family History   Problem Relation Age of Onset    Hypertension Father     Heart disease Father 46    Aortic aneurysm Father         abdominal    Stroke Father 46    Hypertension Mother     Depression Sister     Breast cancer Sister 52    Schizophrenia Maternal Uncle      Schizophrenia Cousin     Cancer Cousin 50        cervical vs. ovarian    Breast cancer Maternal Cousin     Breast cancer Maternal Cousin     Breast cancer Maternal Cousin     Ovarian cancer Neg Hx      Social History     Tobacco Use    Smoking status: Never    Smokeless tobacco: Never   Substance Use Topics    Alcohol use: Yes     Comment: Social; rare use    Drug use: Yes     Types: Marijuana     Comment: uses infrequently     Review of Systems    Physical Exam     Initial Vitals [10/04/23 1010]   BP Pulse Resp Temp SpO2   (!) 171/95 98 18 100.1 °F (37.8 °C) 100 %      MAP       --         Physical Exam    Nursing note and vitals reviewed.  Constitutional: She appears well-developed and well-nourished. No distress.   HENT:   Mouth/Throat: Oropharynx is clear and moist.   Eyes: Conjunctivae are normal. No scleral icterus.   Neck: No JVD present.   Cardiovascular:  Normal rate, regular rhythm and intact distal pulses.           Pulmonary/Chest: Breath sounds normal. No respiratory distress. She has no wheezes. She has no rales.   Abdominal: Abdomen is soft. Bowel sounds are normal. She exhibits no distension. There is abdominal tenderness (+ right mid flank pain, suprapubic pain). There is no rebound.   Musculoskeletal:         General: No edema.     Lymphadenopathy:     She has no cervical adenopathy.   Neurological: She is alert and oriented to person, place, and time.   Skin: Skin is warm. No rash noted.         ED Course   Procedures  Labs Reviewed   VAGINAL SCREEN - Abnormal; Notable for the following components:       Result Value    Clue Cells Moderate (*)     WBC - Vaginal Screen Few (*)     Bacteria - Vaginal Screen Few (*)     All other components within normal limits    Narrative:     Release to patient->Immediate   CBC W/ AUTO DIFFERENTIAL - Abnormal; Notable for the following components:    MCH 32.5 (*)     Lymph # 0.4 (*)     Gran % 83.4 (*)     Lymph % 6.5 (*)     All other components within normal  limits   COMPREHENSIVE METABOLIC PANEL - Abnormal; Notable for the following components:    Potassium 3.1 (*)     Alkaline Phosphatase 34 (*)     All other components within normal limits   URINALYSIS, REFLEX TO URINE CULTURE - Abnormal; Notable for the following components:    Appearance, UA Cloudy (*)     Protein, UA 1+ (*)     Leukocytes, UA 3+ (*)     All other components within normal limits    Narrative:     Specimen Source->Urine   URINALYSIS MICROSCOPIC - Abnormal; Notable for the following components:    WBC, UA >100 (*)     Yeast, UA Few (*)     All other components within normal limits    Narrative:     Specimen Source->Urine   CULTURE, URINE   LIPASE   POCT URINE PREGNANCY          Imaging Results              CT Abdomen Pelvis With Contrast (Final result)  Result time 10/04/23 12:50:54      Final result by Giorgio Conteh MD (10/04/23 12:50:54)                   Impression:      1. Urinary bladder circumferential wall thickening which may be related to underdistention versus nonspecific cystitis.  Clinical correlation and with urinalysis advised.  2. Mild colonic diverticulosis without evidence of diverticulitis.  Specifically, no left lower quadrant inflammatory process.  3. Bilateral renal hypoattenuating parenchymal masses with the largest on the right measuring 2.7 cm with 20 Hounsfield units slightly higher than expected for simple fluid.  Others are either too small to adequately characterize or appears simple.  Further evaluation with elective/nonemergent renal ultrasound can be obtained to confirm cystic components, as warranted.  4. Left adnexal suspected 2 dominant follicles.  Enlarged multi fibroid uterus.  Further evaluation with pelvic ultrasound can be obtained as warranted.      Electronically signed by: Giorgio Conteh MD  Date:    10/04/2023  Time:    12:50               Narrative:    EXAMINATION:  CT ABDOMEN PELVIS WITH CONTRAST    CLINICAL HISTORY:  LLQ abdominal  pain;    TECHNIQUE:  Low dose axial images, sagittal and coronal reformations were obtained from the lung bases to the pubic symphysis following the IV administration of 75 mL of Omnipaque 350 .  Oral contrast was not given.    COMPARISON:  Renal ultrasound 10/21/2016, pelvic ultrasound 11/29/2021, MRI lumbar spine 09/19/2016, CT renal stone study 01/24/2016    FINDINGS:  Imaged lung bases show minimal dependent atelectasis.  No consolidation or pleural effusion.  Base of the heart is normal in size noting trace volume nonspecific pericardial fluid.    Portal vasculature is patent.  Liver, gallbladder, pancreas, spleen, stomach, duodenum and bilateral adrenal glands are within normal limits.  No biliary ductal dilatation.    Bilateral kidneys are normal in size, shape and location with symmetric normal enhancement.  No hydronephrosis or significant perinephric stranding.  Several hypodense cortical masses noted throughout each kidney with the largest on the right measuring up to 2.7 cm with average 20 Hounsfield units and the largest on the left measuring up to 2.4 cm with average 14 Hounsfield units.  The remainder appear simple or subcentimeter and therefore too small to adequately characterize.  Ureters are normal in course and caliber.  Urinary bladder is suboptimally distended with circumferential wall thickening.    Uterus appears enlarged, heterogeneous and lobulated containing several hypodense and also hyperdense parenchymal masses suggesting fibroids.  Two of which are subcentimeter and hyperdense but may be submucosal in location.  Suspected 2 dominant follicles at the left adnexa.  No right adnexal mass.  No significant volume free pelvic fluid.  Pelvic phleboliths noted.    Appendix and terminal ileum are within normal limits.  No evidence of bowel obstruction or acute inflammation noting several scattered colonic diverticula.  No pneumatosis or portal venous gas.    No ascites, free air or  lymphadenopathy by CT criteria.  Minimal scattered calcific atherosclerosis of the abdominal aorta.  No aortic aneurysm or dissection.    Tiny fat containing umbilical hernia.    Osseous structures show age-related minimal to mild degenerative change without acute or destructive process seen.                                       Medications   sodium chloride 0.9% bolus 1,000 mL 1,000 mL (0 mLs Intravenous Stopped 10/4/23 1219)   ketorolac injection 9.999 mg (9.999 mg Intravenous Given 10/4/23 1148)   iohexoL (OMNIPAQUE 350) injection 75 mL (75 mLs Intravenous Given 10/4/23 1238)   cefTRIAXone (ROCEPHIN) 1 g in dextrose 5 % in water (D5W) 100 mL IVPB (MB+) (0 g Intravenous Stopped 10/4/23 1319)   acetaminophen tablet 1,000 mg (1,000 mg Oral Given 10/4/23 1346)     Medical Decision Making  Emergent evaluation a 48-year-old female here for urinary symptoms, now right-sided abdominal pain with diarrhea.    She is hypertensive with low-grade fever at 100.1.  She is overall well-appearing, no acute distress.    Differential includes pyelonephritis, acute cystitis, diverticulitis, TOA, bacterial vaginitis    Patient had a pelvic exam performed by OB yesterday, I do not see where vaginal screen was performed.  She is comfortable with self swab.        Amount and/or Complexity of Data Reviewed  External Data Reviewed:      Details: Pelvic exam performed by private OB, white discharge noted.  No cervical changes    Labs: ordered. Decision-making details documented in ED Course.  Radiology: ordered. Decision-making details documented in ED Course.    Risk  OTC drugs.  Prescription drug management.              Attending Attestation:             Attending ED Notes:   Attending Note:  I have seen the patient, have repeated the key portions of the history and physical, reviewed and agree with the medical documentation, and supervised and managed the medical care of the patient. Additionally, I was present for the critical  portion of any procedure(s) performed.      48 F here for dysuria, low grade temp, pelvic pain and diarrhea.   Low grade temp here  Had pelvic exam yesterday with OB  On exam, abdomen tender in suprapubic area, - CVA  Labs concerning for UTI.  CT abdomen/pelvis with bladder inflammation.  Renal cyst noted and needs follow up imaging- pt notified.   Treated with rocephin here, discharge with keflex and flagyl for BV.     MARCIO Gillette MD  Staff ED Physician              ED Course as of 10/05/23 0853   Wed Oct 04, 2023   1152 WBC: 6.29 [GM]   1152 Hemoglobin: 13.7 [GM]   1152 Preg Test, Ur: Negative [GM]   1235 UA with 3+ leukocytes, greater than 100 wbc's, occasional bacteria, nitrite negative. [GM]   1353 Vaginal screen positive for moderate clue cells, will also start on Flagyl for BV. [GM]      ED Course User Index  [GM] Zeny Gillette MD                    Clinical Impression:   Final diagnoses:  [N28.1] Kidney cysts (Primary)  [N30.00] Acute cystitis without hematuria  [K57.90] Diverticulosis        ED Disposition Condition    Discharge Stable          ED Prescriptions       Medication Sig Dispense Start Date End Date Auth. Provider    cephALEXin (KEFLEX) 500 MG capsule  (Status: Discontinued) Take 1 capsule (500 mg total) by mouth every 12 (twelve) hours. for 7 days 14 capsule 10/4/2023 10/4/2023 Zeny Gillette MD    phenazopyridine (PYRIDIUM) 200 MG tablet  (Status: Discontinued) Take 1 tablet (200 mg total) by mouth 3 (three) times daily with meals. for 2 days 6 tablet 10/4/2023 10/4/2023 Zeny Gillette MD    phenazopyridine (PYRIDIUM) 200 MG tablet Take 1 tablet (200 mg total) by mouth 3 (three) times daily with meals. for 2 days 6 tablet 10/4/2023 10/6/2023 Zeny Gillette MD    cephALEXin (KEFLEX) 500 MG capsule Take 1 capsule (500 mg total) by mouth every 12 (twelve) hours. for 7 days 14 capsule 10/4/2023 10/11/2023 Zeny Gillette MD    metroNIDAZOLE (FLAGYL) 500 MG tablet Take 1 tablet (500 mg total) by  mouth 3 (three) times daily. for 7 days 21 tablet 10/4/2023 10/11/2023 Zeny Gillette MD          Follow-up Information       Follow up With Specialties Details Why Contact Info    Henrik Vazquez MD Internal Medicine, Wound Care   [address] [place] [id number]  [phone]               Zeny Gillette MD  10/05/23 5593

## 2023-10-04 NOTE — ED NOTES
APPEARANCE: awake and alert in NAD. Pt crouched over in stretcher and crying.   SKIN: warm, dry and intact. No breakdown or bruising.  MUSCULOSKELETAL: Patient moving all extremities spontaneously, no obvious swelling or deformities noted. Ambulates independently.  RESPIRATORY: Reports shortness of breath when lying flat. Respirations unlabored. On RA.  CARDIAC: Denies CP, 2+ distal pulses; no peripheral edema noted.  ABDOMEN: S/ND/NT, Reports nausea and diarrhea. Reports flank pain.  : voids spontaneously, denies difficulty  Neurologic: AAO x 4; follows commands equal strength in all extremities; denies numbness/tingling. Denies dizziness/lightheadedness/HA.

## 2023-10-04 NOTE — DISCHARGE INSTRUCTIONS
There are some incidental findings on your CT scan that include bilateral kidney cyst, you need a follow-up outpatient renal study to further evaluate as well as diverticulosis, increase your fiber intake.   There is no evidence of pyelonephritis on your CT.  We will start you on Keflex to treat your urinary tract infection.  Please take the Diflucan as prescribed.  Return to emergency department for fever, worsening pain, inability to tolerate antibiotics or any other concerning symptom.  Please take Tylenol/ibuprofen as needed as well.

## 2023-10-04 NOTE — ED TRIAGE NOTES
Patient comes into the emergency department by POV with complaints of dysuria. Patient states that she's been experiencing dysuria and diarrhea for the past two weeks. Pt reports flank pain that goes into pelvic area, SOB when lying down, and continuous nausea.

## 2023-10-06 LAB — BACTERIA UR CULT: ABNORMAL

## 2023-10-08 ENCOUNTER — HOSPITAL ENCOUNTER (OUTPATIENT)
Facility: HOSPITAL | Age: 48
Discharge: HOME | End: 2023-10-10
Attending: EMERGENCY MEDICINE | Admitting: HOSPITALIST
Payer: MEDICAID

## 2023-10-08 DIAGNOSIS — R50.9 FEVER, UNSPECIFIED FEVER CAUSE: ICD-10-CM

## 2023-10-08 DIAGNOSIS — N12 PYELONEPHRITIS: Primary | ICD-10-CM

## 2023-10-08 DIAGNOSIS — R07.9 CHEST PAIN: ICD-10-CM

## 2023-10-08 PROBLEM — B96.89 BV (BACTERIAL VAGINOSIS): Status: ACTIVE | Noted: 2023-10-08

## 2023-10-08 PROBLEM — R19.7 DIARRHEA: Status: ACTIVE | Noted: 2023-10-08

## 2023-10-08 PROBLEM — F33.2 SEVERE EPISODE OF RECURRENT MAJOR DEPRESSIVE DISORDER: Status: ACTIVE | Noted: 2023-10-08

## 2023-10-08 PROBLEM — N76.0 BV (BACTERIAL VAGINOSIS): Status: ACTIVE | Noted: 2023-10-08

## 2023-10-08 PROBLEM — N39.0 COMPLICATED UTI (URINARY TRACT INFECTION): Status: ACTIVE | Noted: 2023-10-08

## 2023-10-08 LAB
ALBUMIN SERPL BCP-MCNC: 3.6 G/DL (ref 3.5–5.2)
ALP SERPL-CCNC: 51 U/L (ref 55–135)
ALT SERPL W/O P-5'-P-CCNC: 78 U/L (ref 10–44)
ANION GAP SERPL CALC-SCNC: 13 MMOL/L (ref 8–16)
AST SERPL-CCNC: 92 U/L (ref 10–40)
BASOPHILS # BLD AUTO: 0.02 K/UL (ref 0–0.2)
BASOPHILS NFR BLD: 0.5 % (ref 0–1.9)
BILIRUB SERPL-MCNC: 0.6 MG/DL (ref 0.1–1)
BILIRUB UR QL STRIP: NEGATIVE
BUN SERPL-MCNC: 12 MG/DL (ref 6–20)
BUN SERPL-MCNC: 14 MG/DL (ref 6–30)
CALCIUM SERPL-MCNC: 9.5 MG/DL (ref 8.7–10.5)
CHLORIDE SERPL-SCNC: 101 MMOL/L (ref 95–110)
CHLORIDE SERPL-SCNC: 98 MMOL/L (ref 95–110)
CLARITY UR REFRACT.AUTO: ABNORMAL
CO2 SERPL-SCNC: 24 MMOL/L (ref 23–29)
COLOR UR AUTO: YELLOW
CREAT SERPL-MCNC: 0.9 MG/DL (ref 0.5–1.4)
CREAT SERPL-MCNC: 0.9 MG/DL (ref 0.5–1.4)
DIFFERENTIAL METHOD: ABNORMAL
EOSINOPHIL # BLD AUTO: 0 K/UL (ref 0–0.5)
EOSINOPHIL NFR BLD: 0.2 % (ref 0–8)
ERYTHROCYTE [DISTWIDTH] IN BLOOD BY AUTOMATED COUNT: 12.6 % (ref 11.5–14.5)
EST. GFR  (NO RACE VARIABLE): >60 ML/MIN/1.73 M^2
GLUCOSE SERPL-MCNC: 86 MG/DL (ref 70–110)
GLUCOSE SERPL-MCNC: 88 MG/DL (ref 70–110)
GLUCOSE UR QL STRIP: NEGATIVE
HAV IGM SERPL QL IA: NORMAL
HBV CORE IGM SERPL QL IA: NORMAL
HBV SURFACE AG SERPL QL IA: NORMAL
HCT VFR BLD AUTO: 37.9 % (ref 37–48.5)
HCT VFR BLD CALC: 38 %PCV (ref 36–54)
HCV AB SERPL QL IA: NORMAL
HCV AB SERPL QL IA: NORMAL
HGB BLD-MCNC: 13.5 G/DL (ref 12–16)
HGB UR QL STRIP: NEGATIVE
HIV 1+2 AB+HIV1 P24 AG SERPL QL IA: NORMAL
IMM GRANULOCYTES # BLD AUTO: 0.01 K/UL (ref 0–0.04)
IMM GRANULOCYTES NFR BLD AUTO: 0.2 % (ref 0–0.5)
KETONES UR QL STRIP: ABNORMAL
LACTATE SERPL-SCNC: 1.1 MMOL/L (ref 0.5–2.2)
LEUKOCYTE ESTERASE UR QL STRIP: NEGATIVE
LYMPHOCYTES # BLD AUTO: 1 K/UL (ref 1–4.8)
LYMPHOCYTES NFR BLD: 24.6 % (ref 18–48)
MCH RBC QN AUTO: 31.8 PG (ref 27–31)
MCHC RBC AUTO-ENTMCNC: 35.6 G/DL (ref 32–36)
MCV RBC AUTO: 89 FL (ref 82–98)
MONOCYTES # BLD AUTO: 0.5 K/UL (ref 0.3–1)
MONOCYTES NFR BLD: 12.3 % (ref 4–15)
NEUTROPHILS # BLD AUTO: 2.5 K/UL (ref 1.8–7.7)
NEUTROPHILS NFR BLD: 62.2 % (ref 38–73)
NITRITE UR QL STRIP: NEGATIVE
NRBC BLD-RTO: 0 /100 WBC
PH UR STRIP: 7 [PH] (ref 5–8)
PLATELET # BLD AUTO: 281 K/UL (ref 150–450)
PLATELET BLD QL SMEAR: ABNORMAL
PMV BLD AUTO: 10.8 FL (ref 9.2–12.9)
POC IONIZED CALCIUM: 1.09 MMOL/L (ref 1.06–1.42)
POC TCO2 (MEASURED): 29 MMOL/L (ref 23–29)
POTASSIUM BLD-SCNC: 3.5 MMOL/L (ref 3.5–5.1)
POTASSIUM SERPL-SCNC: 3.6 MMOL/L (ref 3.5–5.1)
PROT SERPL-MCNC: 7.9 G/DL (ref 6–8.4)
PROT UR QL STRIP: NEGATIVE
RBC # BLD AUTO: 4.25 M/UL (ref 4–5.4)
SAMPLE: NORMAL
SODIUM BLD-SCNC: 137 MMOL/L (ref 136–145)
SODIUM SERPL-SCNC: 138 MMOL/L (ref 136–145)
SP GR UR STRIP: 1.01 (ref 1–1.03)
TROPONIN I SERPL DL<=0.01 NG/ML-MCNC: <0.006 NG/ML (ref 0–0.03)
URN SPEC COLLECT METH UR: ABNORMAL
WBC # BLD AUTO: 4.07 K/UL (ref 3.9–12.7)

## 2023-10-08 PROCEDURE — 63600175 PHARM REV CODE 636 W HCPCS: Performed by: PHYSICIAN ASSISTANT

## 2023-10-08 PROCEDURE — 81003 URINALYSIS AUTO W/O SCOPE: CPT | Performed by: PHYSICIAN ASSISTANT

## 2023-10-08 PROCEDURE — G0378 HOSPITAL OBSERVATION PER HR: HCPCS

## 2023-10-08 PROCEDURE — 85025 COMPLETE CBC W/AUTO DIFF WBC: CPT | Performed by: PHYSICIAN ASSISTANT

## 2023-10-08 PROCEDURE — 93005 ELECTROCARDIOGRAM TRACING: CPT

## 2023-10-08 PROCEDURE — 25000003 PHARM REV CODE 250: Performed by: PHYSICIAN ASSISTANT

## 2023-10-08 PROCEDURE — 87040 BLOOD CULTURE FOR BACTERIA: CPT | Performed by: PHYSICIAN ASSISTANT

## 2023-10-08 PROCEDURE — 96367 TX/PROPH/DG ADDL SEQ IV INF: CPT

## 2023-10-08 PROCEDURE — 96365 THER/PROPH/DIAG IV INF INIT: CPT

## 2023-10-08 PROCEDURE — 96361 HYDRATE IV INFUSION ADD-ON: CPT

## 2023-10-08 PROCEDURE — 93010 EKG 12-LEAD: ICD-10-PCS | Mod: ,,, | Performed by: INTERNAL MEDICINE

## 2023-10-08 PROCEDURE — 36415 COLL VENOUS BLD VENIPUNCTURE: CPT | Performed by: PHYSICIAN ASSISTANT

## 2023-10-08 PROCEDURE — 83605 ASSAY OF LACTIC ACID: CPT | Performed by: PHYSICIAN ASSISTANT

## 2023-10-08 PROCEDURE — 86803 HEPATITIS C AB TEST: CPT | Performed by: PHYSICIAN ASSISTANT

## 2023-10-08 PROCEDURE — 84484 ASSAY OF TROPONIN QUANT: CPT | Performed by: PHYSICIAN ASSISTANT

## 2023-10-08 PROCEDURE — 87389 HIV-1 AG W/HIV-1&-2 AB AG IA: CPT | Performed by: PHYSICIAN ASSISTANT

## 2023-10-08 PROCEDURE — 99285 EMERGENCY DEPT VISIT HI MDM: CPT | Mod: 25

## 2023-10-08 PROCEDURE — 96375 TX/PRO/DX INJ NEW DRUG ADDON: CPT

## 2023-10-08 PROCEDURE — 93010 ELECTROCARDIOGRAM REPORT: CPT | Mod: ,,, | Performed by: INTERNAL MEDICINE

## 2023-10-08 PROCEDURE — 99223 1ST HOSP IP/OBS HIGH 75: CPT | Mod: ,,, | Performed by: PHYSICIAN ASSISTANT

## 2023-10-08 PROCEDURE — 99223 PR INITIAL HOSPITAL CARE,LEVL III: ICD-10-PCS | Mod: ,,, | Performed by: PHYSICIAN ASSISTANT

## 2023-10-08 PROCEDURE — 80053 COMPREHEN METABOLIC PANEL: CPT | Performed by: PHYSICIAN ASSISTANT

## 2023-10-08 PROCEDURE — 80074 ACUTE HEPATITIS PANEL: CPT | Performed by: PHYSICIAN ASSISTANT

## 2023-10-08 RX ORDER — MORPHINE SULFATE 4 MG/ML
4 INJECTION, SOLUTION INTRAMUSCULAR; INTRAVENOUS
Status: COMPLETED | OUTPATIENT
Start: 2023-10-08 | End: 2023-10-08

## 2023-10-08 RX ORDER — HYDRALAZINE HYDROCHLORIDE 25 MG/1
25 TABLET, FILM COATED ORAL EVERY 8 HOURS PRN
Status: DISCONTINUED | OUTPATIENT
Start: 2023-10-08 | End: 2023-10-10 | Stop reason: HOSPADM

## 2023-10-08 RX ORDER — GLUCAGON 1 MG
1 KIT INJECTION
Status: DISCONTINUED | OUTPATIENT
Start: 2023-10-08 | End: 2023-10-10 | Stop reason: HOSPADM

## 2023-10-08 RX ORDER — METRONIDAZOLE 500 MG/100ML
500 INJECTION, SOLUTION INTRAVENOUS ONCE
Status: COMPLETED | OUTPATIENT
Start: 2023-10-08 | End: 2023-10-08

## 2023-10-08 RX ORDER — AMLODIPINE BESYLATE 10 MG/1
10 TABLET ORAL DAILY
Status: DISCONTINUED | OUTPATIENT
Start: 2023-10-09 | End: 2023-10-09

## 2023-10-08 RX ORDER — IBUPROFEN 200 MG
24 TABLET ORAL
Status: DISCONTINUED | OUTPATIENT
Start: 2023-10-08 | End: 2023-10-10 | Stop reason: HOSPADM

## 2023-10-08 RX ORDER — OXYCODONE HYDROCHLORIDE 5 MG/1
5 TABLET ORAL EVERY 6 HOURS PRN
Status: DISCONTINUED | OUTPATIENT
Start: 2023-10-08 | End: 2023-10-10 | Stop reason: HOSPADM

## 2023-10-08 RX ORDER — POLYETHYLENE GLYCOL 3350 17 G/17G
17 POWDER, FOR SOLUTION ORAL DAILY PRN
Status: DISCONTINUED | OUTPATIENT
Start: 2023-10-08 | End: 2023-10-10 | Stop reason: HOSPADM

## 2023-10-08 RX ORDER — VALSARTAN 160 MG/1
320 TABLET ORAL DAILY
Status: DISCONTINUED | OUTPATIENT
Start: 2023-10-09 | End: 2023-10-10 | Stop reason: HOSPADM

## 2023-10-08 RX ORDER — SPIRONOLACTONE 25 MG/1
50 TABLET ORAL DAILY
Status: DISCONTINUED | OUTPATIENT
Start: 2023-10-09 | End: 2023-10-10 | Stop reason: HOSPADM

## 2023-10-08 RX ORDER — ONDANSETRON 2 MG/ML
4 INJECTION INTRAMUSCULAR; INTRAVENOUS EVERY 8 HOURS PRN
Status: DISCONTINUED | OUTPATIENT
Start: 2023-10-08 | End: 2023-10-10 | Stop reason: HOSPADM

## 2023-10-08 RX ORDER — TALC
6 POWDER (GRAM) TOPICAL NIGHTLY PRN
Status: DISCONTINUED | OUTPATIENT
Start: 2023-10-08 | End: 2023-10-10 | Stop reason: HOSPADM

## 2023-10-08 RX ORDER — BISACODYL 10 MG
10 SUPPOSITORY, RECTAL RECTAL DAILY PRN
Status: DISCONTINUED | OUTPATIENT
Start: 2023-10-08 | End: 2023-10-10 | Stop reason: HOSPADM

## 2023-10-08 RX ORDER — IBUPROFEN 200 MG
16 TABLET ORAL
Status: DISCONTINUED | OUTPATIENT
Start: 2023-10-08 | End: 2023-10-10 | Stop reason: HOSPADM

## 2023-10-08 RX ORDER — ACETAMINOPHEN 325 MG/1
650 TABLET ORAL EVERY 4 HOURS PRN
Status: DISCONTINUED | OUTPATIENT
Start: 2023-10-08 | End: 2023-10-10 | Stop reason: HOSPADM

## 2023-10-08 RX ORDER — METRONIDAZOLE 500 MG/1
500 TABLET ORAL EVERY 8 HOURS
Status: DISCONTINUED | OUTPATIENT
Start: 2023-10-09 | End: 2023-10-10 | Stop reason: HOSPADM

## 2023-10-08 RX ORDER — MORPHINE SULFATE 2 MG/ML
2 INJECTION, SOLUTION INTRAMUSCULAR; INTRAVENOUS
Status: DISCONTINUED | OUTPATIENT
Start: 2023-10-08 | End: 2023-10-08

## 2023-10-08 RX ORDER — METRONIDAZOLE 500 MG/1
500 TABLET ORAL EVERY 12 HOURS
Status: DISCONTINUED | OUTPATIENT
Start: 2023-10-08 | End: 2023-10-08

## 2023-10-08 RX ORDER — CITALOPRAM 20 MG/1
40 TABLET, FILM COATED ORAL DAILY
Status: DISCONTINUED | OUTPATIENT
Start: 2023-10-09 | End: 2023-10-10 | Stop reason: HOSPADM

## 2023-10-08 RX ORDER — ACETAMINOPHEN 500 MG
1000 TABLET ORAL
Status: DISCONTINUED | OUTPATIENT
Start: 2023-10-08 | End: 2023-10-08

## 2023-10-08 RX ORDER — PROCHLORPERAZINE EDISYLATE 5 MG/ML
5 INJECTION INTRAMUSCULAR; INTRAVENOUS EVERY 6 HOURS PRN
Status: DISCONTINUED | OUTPATIENT
Start: 2023-10-08 | End: 2023-10-10 | Stop reason: HOSPADM

## 2023-10-08 RX ADMIN — CEFTRIAXONE 2 G: 2 INJECTION, POWDER, FOR SOLUTION INTRAMUSCULAR; INTRAVENOUS at 02:10

## 2023-10-08 RX ADMIN — SODIUM CHLORIDE, POTASSIUM CHLORIDE, SODIUM LACTATE AND CALCIUM CHLORIDE 2000 ML: 600; 310; 30; 20 INJECTION, SOLUTION INTRAVENOUS at 02:10

## 2023-10-08 RX ADMIN — METRONIDAZOLE 500 MG: 5 INJECTION, SOLUTION INTRAVENOUS at 03:10

## 2023-10-08 RX ADMIN — Medication 6 MG: at 09:10

## 2023-10-08 RX ADMIN — OXYCODONE HYDROCHLORIDE 5 MG: 5 TABLET ORAL at 09:10

## 2023-10-08 RX ADMIN — MORPHINE SULFATE 4 MG: 4 INJECTION INTRAVENOUS at 02:10

## 2023-10-08 NOTE — HPI
Navjot Segovia is a 48 y.o. female with a PMHx of HTN, HLD, anxiety, depression, recent diagnosis of BV and UTI who presents to [facility] for evaluation of right flank pain. Patient reports dysuria, urinary frequency, foul smelling urine, white watery vaginal discharge, as well as postcoital pain and spotting began about 1 week ago for which she was seen in the ED on 10/4 and found to have UTI and BV. She was discharged home on PO flagyl and Keflex. She reports resolution of dysuria and vaginal discharge, however she developed achy right flank pain with associated nausea and vomiting so her OBGYN switched her antibiotics from keflex to macrobid and metronidazole from oral to vaginal three days ago. She has persistent R flank pain with associated fever to Tmax 102.2 despite freuent use of tylenol and ibuprofen so she presented here for further eval. Also endorses persistent diarrhea with dark watery stools without mucus which has been going on since prior to antibiotic use 1 week ago. Additionally notes left upper chest pain which typically occurs with bouts of pain or fever, currently CP free. Denies chills, constipation, vomiting, hematuria, sweating, vaginal discharge, vaginal itching, weakness, syncope.     ED: hypertensive to /85, vitals otherwise stable. No leukocytosis or electrolyte abnormalities. AST 92, ALT 78. Lactate WNL. UA noninfectious. CT abd/pelvis shows mild right pelvicaliectasis. Slight wall thickening of the right renal pelvis and ureter. Given 2L LR, IV rocephin and flagyl.

## 2023-10-08 NOTE — ED NOTES
LOC: The patient is awake, alert and aware of environment with an appropriate affect, the patient is oriented x 3 and speaking appropriately.   APPEARANCE: Patient appears comfortable and in no acute distress, patient is clean and well groomed.  HEENT: Head is normocephalic and sits midline on the shoulders, eyes are symmetrical with no blurry vision or glasses, Ears are clean, dry, intact, patent with no cerumen blocking the pathway, no hearing aids present, nose is free from clean, dry, intact, without any drainage, both nostrils are patent.   SKIN: The skin is warm and dry, color consistent with ethnicity, patient has normal skin turgor and moist mucus membranes, skin intact, no breakdown or bruising noted. No drains or devices.  MUSCULOSKELETAL: Patient moving all extremities spontaneously with AROM in all extremities, no swelling noted  RESPIRATORY: Airway is open and patent, respirations are spontaneous, patient has a normal effort and rate, no accessory muscle use noted, pt has O2 sats noted at 99% on room air, breath sounds are clear and equal bilaterally  CARDIAC: Patient has a normal rate and regular rhythm, no edema noted, capillary refill < 3 seconds.   GASTRO: Soft and non tender to palpation, no distention noted, normoactive bowel sounds present in all four quadrants. Pt states bowel movements have been regular.  : Pt denies any pain or frequency with urination. Pt. Complains of right flank pain  NEURO: Pt opens eyes spontaneously, behavior appropriate to situation, follows commands, facial expression symmetrical, bilateral hand grasp equal and even, purposeful motor response noted, normal sensation in all extremities when touched with a finger, YADIRA noted  Peripheral Vascular: All pulses 2+, all extremities warm, no numbness or tingling to any extremities, no edema noted

## 2023-10-08 NOTE — ED TRIAGE NOTES
Patient presents to the ED with complaints of worsening right sided abdominal pain that started last wed when she was diagnosed with a UTI. Pt. Stated that she was given antibiotics in ER and by her PCP and states the pain and fevers have not gone anywhere. Pt. Also endorses some diarrhea, nausea, and vomiting as well. Hx. Of hypertension.

## 2023-10-08 NOTE — ED NOTES
I-STAT Chem-8+ Results:   Value Reference Range   Sodium 137 136-145 mmol/L   Potassium  3.5 3.5-5.1 mmol/L   Chloride 98  mmol/L   Ionized Calcium 1.09 1.06-1.42 mmol/L   CO2 (measured) 29 23-29 mmol/L   Glucose 88  mg/dL   BUN 14 6-30 mg/dL   Creatinine 0.9 0.5-1.4 mg/dL   Hematocrit 38 36-54%

## 2023-10-08 NOTE — ED PROVIDER NOTES
Encounter Date: 10/8/2023       History     Chief Complaint   Patient presents with    Abdominal Pain     Pt was here Wednesday diagnosed with UTI on antibiotics and pain is getting worse     48 y.o. female with significant past medical history of HTN, HLD, anxiety, depression, recent diagnosis of BV and UTI presented to the ER complaining of persistent flank pain on the right and urinary frequency.  ED visit 10/4 dysuria, urinary frequency, foul smelling urine, white watery vaginal discharge, as well as postcoital pain and spotting.  Results from ED visit 10/4 UA with 3+ leukocytes, greater than 100 wbc's, E Coli sensitive to keflex; Vaginal screen positive for moderate clue cells, started on Flagyl for BV.  Pt reports resolution of dysuria, white vaginal discharge.  No intercourse.  Pt took 800 mg ibuprofen this morning, Tmax 102.2 F.  Pt reports OB/GYN switched her antibiotic from keflex to macrobid and metronidazole from oral to vaginal three days prior to this ED visit.  Pt reports continued diarrhea with dark watery stools with no mucus.  She denies melena or BRB.  Diarrhea has persisted for >1 week.  Of note in review of ED note 10/4 pt had taken old bactrim prior to initial ED visit for vaginal cyst and swollen inguinal lymph nodes which resolved.  Pt denies chills, constipation, vomiting, hematuria, sweating, vaginal discharge, vaginal itching, weakness, syncope.           Review of patient's allergies indicates:   Allergen Reactions    Ace inhibitors      Other reaction(s): cough     Past Medical History:   Diagnosis Date    Anxiety     Depression     History of acne     Hx of psychiatric care     Hyperlipidemia     Hypertension     Keloid cicatrix     Psychiatric problem     Psychosis     Sleep difficulties     Therapy      Past Surgical History:   Procedure Laterality Date     SECTION, CLASSIC      x 2    Laparoscopic bilateral tubal ligation.      TONSILLECTOMY       Family History   Problem  Relation Age of Onset    Hypertension Father     Heart disease Father 46    Aortic aneurysm Father         abdominal    Stroke Father 46    Hypertension Mother     Depression Sister     Breast cancer Sister 52    Schizophrenia Maternal Uncle     Schizophrenia Cousin     Cancer Cousin 50        cervical vs. ovarian    Breast cancer Maternal Cousin     Breast cancer Maternal Cousin     Breast cancer Maternal Cousin     Ovarian cancer Neg Hx      Social History     Tobacco Use    Smoking status: Never    Smokeless tobacco: Never   Substance Use Topics    Alcohol use: Yes     Comment: Social; rare use    Drug use: Yes     Types: Marijuana     Comment: uses infrequently     Review of Systems   Constitutional:  Negative for chills, diaphoresis, fatigue and fever.   Respiratory:  Negative for shortness of breath.    Cardiovascular:  Negative for chest pain and palpitations.   Gastrointestinal:  Positive for diarrhea. Negative for abdominal pain, blood in stool, constipation, nausea and vomiting.        R CVA tenderness   Genitourinary:  Positive for flank pain and frequency. Negative for dysuria, hematuria, vaginal bleeding and vaginal discharge.   Musculoskeletal:  Negative for back pain and myalgias.   Skin:  Negative for rash.   Neurological:  Negative for dizziness, syncope, weakness, light-headedness and headaches.   Hematological:  Does not bruise/bleed easily.   Psychiatric/Behavioral:  Negative for confusion.        Physical Exam     Initial Vitals [10/08/23 1308]   BP Pulse Resp Temp SpO2   (!) 155/81 84 18 99.5 °F (37.5 °C) 98 %      MAP       --         Physical Exam    Nursing note and vitals reviewed.  Constitutional: She appears well-developed and well-nourished.   HENT:   Head: Normocephalic and atraumatic.   Eyes: EOM are normal. Pupils are equal, round, and reactive to light.   Neck: Neck supple.   Normal range of motion.  Cardiovascular:  Normal rate and regular rhythm.           Pulmonary/Chest: Breath  sounds normal.   Abdominal: There is no abdominal tenderness.   There is right CVA tenderness.  No left CVA tenderness. There is no guarding.   Musculoskeletal:      Cervical back: Normal range of motion and neck supple.     Neurological: She is alert and oriented to person, place, and time. She has normal strength. No cranial nerve deficit or sensory deficit.   Skin: Skin is warm and dry.         ED Course   Procedures  Labs Reviewed   URINALYSIS, REFLEX TO URINE CULTURE - Abnormal; Notable for the following components:       Result Value    Appearance, UA Hazy (*)     Ketones, UA Trace (*)     All other components within normal limits    Narrative:     Specimen Source->Urine   COMPREHENSIVE METABOLIC PANEL - Abnormal; Notable for the following components:    Alkaline Phosphatase 51 (*)     AST 92 (*)     ALT 78 (*)     All other components within normal limits    Narrative:     Release to patient->Immediate   CBC W/ AUTO DIFFERENTIAL - Abnormal; Notable for the following components:    MCH 31.8 (*)     All other components within normal limits    Narrative:     Release to patient->Immediate   CULTURE, BLOOD   CULTURE, BLOOD   CLOSTRIDIUM DIFFICILE   HIV 1 / 2 ANTIBODY    Narrative:     Release to patient->Immediate   HEPATITIS C ANTIBODY    Narrative:     Release to patient->Immediate   LACTIC ACID, PLASMA   HEPATITIS PANEL, ACUTE   ISTAT PROCEDURE   ISTAT CHEM8          Imaging Results              CT Abdomen Pelvis  Without Contrast (In process)  Result time 10/08/23 16:03:16                     Medications   lactated ringers bolus 2,000 mL (2,000 mLs Intravenous New Bag 10/8/23 1428)   metronidazole IVPB 500 mg (500 mg Intravenous New Bag 10/8/23 1557)   morphine injection 4 mg (4 mg Intravenous Given 10/8/23 1442)   cefTRIAXone (ROCEPHIN) 2 g in dextrose 5 % in water (D5W) 100 mL IVPB (MB+) (0 g Intravenous Stopped 10/8/23 1527)     Medical Decision Making  48 y.o. year old female presenting with persistent R  sided flank pain, diarrhea and urinary frequency.  Pt discharged from ED 4 days ago on Flagyl and Keflex which she confirms compliance with.  Three days prior to arrival her OB switched the Keflex to Macrobid.    On exam patient is afebrile and nontoxic. HEENT exam normal. Heart rate and rhythm are regular. Lungs with clear breath sounds throughout. Abdomen is soft, nontender. No edema.  +R CVA tenderness.    DDx includes but is not limited to pyelonephritis, C Diff, sepsis/bacteremia, hepatitis.    Temp 99.5° upon arrival to ED. Patient reports she had taken ibuprofen 800 mg this morning with T-max of 102.2° at home.  ED workup reveals lactic acid within normal limits at 1.1, CBC without leukocytosis, CMP with elevated AST and ALT 92 and 78 respectively.  Normal T bili at 0.6.  UA with improvement in leukocytes.    BC x 2 drawn and pending.  C Diff drawn and pending.  CT A/P ordered.    Plan patient given ceftriaxone 2 g and LR fluid bolus.  Flagyl IV, pt reports cannot tolerate po flagyl.  Blood cultures and hepatitis panel drawn.  Patient given morphine for pain, improved.  Pt signed out to Judie Perry PA-C.  Plan for observation.    Discussed findings and plan with patient who verbalized understanding and agrees with the plan and course of treatment. I discussed the care of this patient with my supervising physician.       Amount and/or Complexity of Data Reviewed  Labs: ordered.  Radiology: ordered.    Risk  OTC drugs.  Prescription drug management.                               Clinical Impression:   Final diagnoses:  [N12] Pyelonephritis (Primary)  [R50.9] Fever, unspecified fever cause               Viri Mojica PA-C  10/08/23 1614       Viri Mojica PA-C  10/09/23 5003

## 2023-10-08 NOTE — PROVIDER PROGRESS NOTES - EMERGENCY DEPT.
Encounter Date: 10/8/2023    ED Physician Progress Notes          ED Physician Hand-off Note:    ED Course: I assumed care of patient from off-going ED physician team. Briefly, Patient is a 47 yo F presenting with flank pain and urinary frequency. Fever of 102.2 this morning.     At the time of signout plan was pending CT abdomen pelvis.    Medications given in the ED:    Medications   morphine injection 4 mg (4 mg Intravenous Given 10/8/23 1442)   cefTRIAXone (ROCEPHIN) 2 g in dextrose 5 % in water (D5W) 100 mL IVPB (MB+) (0 g Intravenous Stopped 10/8/23 1527)   lactated ringers bolus 2,000 mL (0 mLs Intravenous Stopped 10/8/23 1628)   metronidazole IVPB 500 mg (0 mg Intravenous Stopped 10/8/23 1657)       Imaging Results              CT Abdomen Pelvis  Without Contrast (Final result)  Result time 10/08/23 16:47:49      Final result by Allan Kennedy MD (10/08/23 16:47:49)                   Impression:      1. Mild right pelvicaliectasis.  Slight wall thickening of the right renal pelvis and ureter.  Recommend correlation for urinary tract infection.  2. Few punctate nonobstructing right renal stones.  3. Bilateral renal cysts.  4. Moderate stool burden throughout the colon.  5. Enlarged lobular uterus which may represent fibroids.  6. Additional findings as above.    Electronically signed by resident: Francisca Du  Date:    10/08/2023  Time:    16:03    Electronically signed by: Allan Kennedy  Date:    10/08/2023  Time:    16:47               Narrative:    EXAMINATION:  CT ABDOMEN PELVIS WITHOUT CONTRAST    CLINICAL HISTORY:  UTI, recurrent/complicated (Female);    TECHNIQUE:  Axial images of the abdomen and pelvis were acquired without the use of IV contrast.  Coronal and sagittal reconstructions were also obtained    COMPARISON:  CT 10/04/2023    FINDINGS:  Heart: Partially visualized heart appears normal in size. No pericardial effusion.    Lungs: Visualized portions of the lungs are clear.    Stomach and  duodenum: Unremarkable.    Liver: Upper limits of normal size.  No focal hepatic lesion.    Gallbladder: Unremarkable.    Bile Ducts: No evidence of dilated ducts.    Spleen: Upper limit of normal size.    Pancreas: No mass or peripancreatic fat stranding.    Adrenals: Unremarkable.    Kidneys/ Ureters: Normal in size and location.  Two punctate nonobstructing stones within the right kidney measuring approximately 2 mm (series 601, image 121 and 124).  Mild right pelvicaliectasis.  Slight wall thickening of the right renal pelvis and ureter.  No left hydronephrosis.  Bilateral renal cysts.    Bladder: Mildly distended.  No wall thickening or inflammatory changes.    Reproductive organs: Enlarged lobular uterus likely representing fibroids, similar to prior exam.    Bowel/Mesentery: No evidence of bowel obstruction or inflammation.  Few scattered colonic diverticula.  Moderate volume stool burden throughout the colon.    Peritoneum: Trace pelvic free fluid, likely physiologic.    Lymph nodes: No retroperitoneal lymphadenopathy.    Vasculature: No aneurysm. Mild calcific atherosclerosis.    Abdominal wall:  Unremarkable.    Bones: No acute fracture. No suspicious osseous lesions.                                      Disposition: observation     Patient comfortable with admission. Patient counseled regarding exam, results, diagnosis, treatment, and plan.    Impression:     Final diagnoses:  [N12] Pyelonephritis (Primary)  [R50.9] Fever, unspecified fever cause

## 2023-10-09 PROBLEM — E87.6 HYPOKALEMIA: Status: ACTIVE | Noted: 2023-10-09

## 2023-10-09 PROBLEM — K59.00 CONSTIPATION: Status: ACTIVE | Noted: 2023-10-09

## 2023-10-09 LAB
ALBUMIN SERPL BCP-MCNC: 3.1 G/DL (ref 3.5–5.2)
ALP SERPL-CCNC: 48 U/L (ref 55–135)
ALT SERPL W/O P-5'-P-CCNC: 58 U/L (ref 10–44)
ANION GAP SERPL CALC-SCNC: 9 MMOL/L (ref 8–16)
ANISOCYTOSIS BLD QL SMEAR: SLIGHT
AST SERPL-CCNC: 50 U/L (ref 10–40)
BASOPHILS # BLD AUTO: 0.02 K/UL (ref 0–0.2)
BASOPHILS NFR BLD: 0.4 % (ref 0–1.9)
BILIRUB SERPL-MCNC: 0.5 MG/DL (ref 0.1–1)
BUN SERPL-MCNC: 8 MG/DL (ref 6–20)
BURR CELLS BLD QL SMEAR: ABNORMAL
CALCIUM SERPL-MCNC: 9 MG/DL (ref 8.7–10.5)
CHLORIDE SERPL-SCNC: 101 MMOL/L (ref 95–110)
CO2 SERPL-SCNC: 25 MMOL/L (ref 23–29)
CREAT SERPL-MCNC: 0.8 MG/DL (ref 0.5–1.4)
DIFFERENTIAL METHOD: ABNORMAL
EOSINOPHIL # BLD AUTO: 0 K/UL (ref 0–0.5)
EOSINOPHIL NFR BLD: 0.4 % (ref 0–8)
ERYTHROCYTE [DISTWIDTH] IN BLOOD BY AUTOMATED COUNT: 12.4 % (ref 11.5–14.5)
EST. GFR  (NO RACE VARIABLE): >60 ML/MIN/1.73 M^2
GLUCOSE SERPL-MCNC: 88 MG/DL (ref 70–110)
HCT VFR BLD AUTO: 34.2 % (ref 37–48.5)
HGB BLD-MCNC: 12.1 G/DL (ref 12–16)
IMM GRANULOCYTES # BLD AUTO: 0.03 K/UL (ref 0–0.04)
IMM GRANULOCYTES NFR BLD AUTO: 0.6 % (ref 0–0.5)
LYMPHOCYTES # BLD AUTO: 1.7 K/UL (ref 1–4.8)
LYMPHOCYTES NFR BLD: 36.6 % (ref 18–48)
MAGNESIUM SERPL-MCNC: 1.6 MG/DL (ref 1.6–2.6)
MCH RBC QN AUTO: 31.4 PG (ref 27–31)
MCHC RBC AUTO-ENTMCNC: 35.4 G/DL (ref 32–36)
MCV RBC AUTO: 89 FL (ref 82–98)
MONOCYTES # BLD AUTO: 0.9 K/UL (ref 0.3–1)
MONOCYTES NFR BLD: 18.9 % (ref 4–15)
NEUTROPHILS # BLD AUTO: 2 K/UL (ref 1.8–7.7)
NEUTROPHILS NFR BLD: 43.1 % (ref 38–73)
NRBC BLD-RTO: 0 /100 WBC
OVALOCYTES BLD QL SMEAR: ABNORMAL
PHOSPHATE SERPL-MCNC: 3.1 MG/DL (ref 2.7–4.5)
PLATELET # BLD AUTO: 236 K/UL (ref 150–450)
PMV BLD AUTO: 10.6 FL (ref 9.2–12.9)
POIKILOCYTOSIS BLD QL SMEAR: SLIGHT
POLYCHROMASIA BLD QL SMEAR: ABNORMAL
POTASSIUM SERPL-SCNC: 2.8 MMOL/L (ref 3.5–5.1)
PROT SERPL-MCNC: 6.4 G/DL (ref 6–8.4)
RBC # BLD AUTO: 3.85 M/UL (ref 4–5.4)
SODIUM SERPL-SCNC: 135 MMOL/L (ref 136–145)
WBC # BLD AUTO: 4.75 K/UL (ref 3.9–12.7)

## 2023-10-09 PROCEDURE — 96375 TX/PRO/DX INJ NEW DRUG ADDON: CPT

## 2023-10-09 PROCEDURE — 25000003 PHARM REV CODE 250: Performed by: HOSPITALIST

## 2023-10-09 PROCEDURE — G0378 HOSPITAL OBSERVATION PER HR: HCPCS

## 2023-10-09 PROCEDURE — 83735 ASSAY OF MAGNESIUM: CPT | Performed by: PHYSICIAN ASSISTANT

## 2023-10-09 PROCEDURE — 96366 THER/PROPH/DIAG IV INF ADDON: CPT

## 2023-10-09 PROCEDURE — 99233 SBSQ HOSP IP/OBS HIGH 50: CPT | Mod: ,,,

## 2023-10-09 PROCEDURE — 80053 COMPREHEN METABOLIC PANEL: CPT | Performed by: PHYSICIAN ASSISTANT

## 2023-10-09 PROCEDURE — 25000003 PHARM REV CODE 250: Performed by: PHYSICIAN ASSISTANT

## 2023-10-09 PROCEDURE — 99233 PR SUBSEQUENT HOSPITAL CARE,LEVL III: ICD-10-PCS | Mod: ,,,

## 2023-10-09 PROCEDURE — 25000003 PHARM REV CODE 250

## 2023-10-09 PROCEDURE — 63600175 PHARM REV CODE 636 W HCPCS: Performed by: PHYSICIAN ASSISTANT

## 2023-10-09 PROCEDURE — 63600175 PHARM REV CODE 636 W HCPCS

## 2023-10-09 PROCEDURE — 96365 THER/PROPH/DIAG IV INF INIT: CPT | Mod: 59

## 2023-10-09 PROCEDURE — 96376 TX/PRO/DX INJ SAME DRUG ADON: CPT

## 2023-10-09 PROCEDURE — 84100 ASSAY OF PHOSPHORUS: CPT | Performed by: PHYSICIAN ASSISTANT

## 2023-10-09 PROCEDURE — 36415 COLL VENOUS BLD VENIPUNCTURE: CPT | Performed by: PHYSICIAN ASSISTANT

## 2023-10-09 PROCEDURE — 85025 COMPLETE CBC W/AUTO DIFF WBC: CPT | Performed by: PHYSICIAN ASSISTANT

## 2023-10-09 RX ORDER — MAGNESIUM SULFATE HEPTAHYDRATE 40 MG/ML
2 INJECTION, SOLUTION INTRAVENOUS ONCE
Status: COMPLETED | OUTPATIENT
Start: 2023-10-09 | End: 2023-10-09

## 2023-10-09 RX ORDER — AMOXICILLIN 250 MG
1 CAPSULE ORAL DAILY
Status: DISCONTINUED | OUTPATIENT
Start: 2023-10-09 | End: 2023-10-10 | Stop reason: HOSPADM

## 2023-10-09 RX ORDER — POTASSIUM CHLORIDE 20 MEQ/1
40 TABLET, EXTENDED RELEASE ORAL
Status: COMPLETED | OUTPATIENT
Start: 2023-10-09 | End: 2023-10-09

## 2023-10-09 RX ORDER — NIFEDIPINE 30 MG/1
60 TABLET, EXTENDED RELEASE ORAL DAILY
Status: DISCONTINUED | OUTPATIENT
Start: 2023-10-10 | End: 2023-10-10 | Stop reason: HOSPADM

## 2023-10-09 RX ORDER — POTASSIUM CHLORIDE 7.45 MG/ML
10 INJECTION INTRAVENOUS
Status: COMPLETED | OUTPATIENT
Start: 2023-10-09 | End: 2023-10-09

## 2023-10-09 RX ADMIN — AMLODIPINE BESYLATE 10 MG: 10 TABLET ORAL at 09:10

## 2023-10-09 RX ADMIN — VALSARTAN 320 MG: 160 TABLET, FILM COATED ORAL at 09:10

## 2023-10-09 RX ADMIN — ACETAMINOPHEN 650 MG: 325 TABLET ORAL at 12:10

## 2023-10-09 RX ADMIN — MAGNESIUM SULFATE HEPTAHYDRATE 2 G: 40 INJECTION, SOLUTION INTRAVENOUS at 09:10

## 2023-10-09 RX ADMIN — OXYCODONE HYDROCHLORIDE 5 MG: 5 TABLET ORAL at 12:10

## 2023-10-09 RX ADMIN — METRONIDAZOLE 500 MG: 500 TABLET ORAL at 02:10

## 2023-10-09 RX ADMIN — SPIRONOLACTONE 50 MG: 25 TABLET, FILM COATED ORAL at 09:10

## 2023-10-09 RX ADMIN — CEFTRIAXONE 1 G: 1 INJECTION, POWDER, FOR SOLUTION INTRAMUSCULAR; INTRAVENOUS at 03:10

## 2023-10-09 RX ADMIN — POTASSIUM CHLORIDE 10 MEQ: 7.46 INJECTION, SOLUTION INTRAVENOUS at 11:10

## 2023-10-09 RX ADMIN — METRONIDAZOLE 500 MG: 500 TABLET ORAL at 10:10

## 2023-10-09 RX ADMIN — ONDANSETRON 4 MG: 2 INJECTION INTRAMUSCULAR; INTRAVENOUS at 09:10

## 2023-10-09 RX ADMIN — CITALOPRAM HYDROBROMIDE 40 MG: 20 TABLET ORAL at 09:10

## 2023-10-09 RX ADMIN — POTASSIUM CHLORIDE 10 MEQ: 7.46 INJECTION, SOLUTION INTRAVENOUS at 10:10

## 2023-10-09 RX ADMIN — OXYCODONE HYDROCHLORIDE 5 MG: 5 TABLET ORAL at 09:10

## 2023-10-09 RX ADMIN — POTASSIUM CHLORIDE 40 MEQ: 1500 TABLET, EXTENDED RELEASE ORAL at 10:10

## 2023-10-09 RX ADMIN — Medication 1 CAPSULE: at 12:10

## 2023-10-09 RX ADMIN — POTASSIUM CHLORIDE 40 MEQ: 1500 TABLET, EXTENDED RELEASE ORAL at 09:10

## 2023-10-09 RX ADMIN — SENNOSIDES AND DOCUSATE SODIUM 1 TABLET: 50; 8.6 TABLET ORAL at 12:10

## 2023-10-09 RX ADMIN — METRONIDAZOLE 500 MG: 500 TABLET ORAL at 05:10

## 2023-10-09 RX ADMIN — OXYCODONE HYDROCHLORIDE 5 MG: 5 TABLET ORAL at 04:10

## 2023-10-09 NOTE — H&P
[facility] [address] [place] [address] Medicine  History & Physical    Patient Name: Navjot Segovia  MRN: [id number]  Patient Class: OP- Observation  Admission Date: 10/8/2023  Attending Physician: Amelia Jansen MD   Primary Care Provider: Henrik Vazquez MD         Patient information was obtained from patient, past medical records and ER records.     Subjective:     Principal Problem:Complicated UTI (urinary tract infection)    Chief Complaint:   Chief Complaint   Patient presents with    Abdominal Pain     Pt was here Wednesday diagnosed with UTI on antibiotics and pain is getting worse        HPI: Navjot Segovia is a 48 y.o. female with a PMHx of HTN, HLD, anxiety, depression, recent diagnosis of BV and UTI who presents to [facility] for evaluation of right flank pain. Patient reports dysuria, urinary frequency, foul smelling urine, white watery vaginal discharge, as well as postcoital pain and spotting began about 1 week ago for which she was seen in the ED on 10/4 and found to have UTI and BV. She was discharged home on PO flagyl and Keflex. She reports resolution of dysuria and vaginal discharge, however she developed achy right flank pain with associated nausea and vomiting so her OBGYN switched her antibiotics from keflex to macrobid and metronidazole from oral to vaginal three days ago. She has persistent R flank pain with associated fever to Tmax 102.2 despite freuent use of tylenol and ibuprofen so she presented here for further eval. Also endorses persistent diarrhea with dark watery stools without mucus which has been going on since prior to antibiotic use 1 week ago. Additionally notes left upper chest pain which typically occurs with bouts of pain or fever, currently CP free. Denies chills, constipation, vomiting, hematuria, sweating, vaginal discharge, vaginal itching, weakness, syncope.     ED: hypertensive to /85, vitals otherwise stable. No leukocytosis or electrolyte abnormalities. AST 92, ALT 78.  Lactate WNL. UA noninfectious. CT abd/pelvis shows mild right pelvicaliectasis. Slight wall thickening of the right renal pelvis and ureter. Given 2L LR, IV rocephin and flagyl.       Past Medical History:   Diagnosis Date    Anxiety     Depression     History of acne     Hx of psychiatric care     Hyperlipidemia     Hypertension     Keloid cicatrix     Psychiatric problem     Psychosis     Sleep difficulties     Therapy        Past Surgical History:   Procedure Laterality Date     SECTION, CLASSIC      x 2    Laparoscopic bilateral tubal ligation.      TONSILLECTOMY         Review of patient's allergies indicates:   Allergen Reactions    Ace inhibitors      Other reaction(s): cough       No current facility-administered medications on file prior to encounter.     Current Outpatient Medications on File Prior to Encounter   Medication Sig    amLODIPine (NORVASC) 10 MG tablet TAKE 1 TABLET(10 MG) BY MOUTH EVERY DAY    cephALEXin (KEFLEX) 500 MG capsule Take 1 capsule (500 mg total) by mouth every 12 (twelve) hours. for 7 days    citalopram (CELEXA) 40 MG tablet Take 1 tablet (40 mg total) by mouth once daily.    fluocinonide (LIDEX) 0.05 % external solution APPLY TO AFFECTED AREA ONCE A DAY    ibuprofen (ADVIL,MOTRIN) 600 MG tablet Take 1 tablet (600 mg total) by mouth every 6 (six) hours as needed for Pain.    ketoconazole (NIZORAL) 2 % shampoo APPLY TO THE SCALP 2 TIMES WEEKLY    metroNIDAZOLE (FLAGYL) 500 MG tablet Take 1 tablet (500 mg total) by mouth 3 (three) times daily. for 7 days    multivitamin with minerals tablet Take 1 tablet by mouth once daily.    spironolactone (ALDACTONE) 50 MG tablet Take 1 tablet (50 mg total) by mouth once daily.    tretinoin (RETIN-A) 0.025 % cream APPLY THIN LAYER TO FACE AT BEDTIME    valsartan (DIOVAN) 320 MG tablet TAKE 1 TABLET(320 MG) BY MOUTH EVERY DAY     Family History       Problem Relation (Age of Onset)    Aortic aneurysm Father     Breast cancer Sister (52), Maternal Cousin, Maternal Cousin, Maternal Cousin    Cancer Cousin (50)    Depression Sister    Heart disease Father (46)    Hypertension Father, Mother    Schizophrenia Maternal Uncle, Cousin    Stroke Father (46)          Tobacco Use    Smoking status: Never    Smokeless tobacco: Never   Substance and Sexual Activity    Alcohol use: Yes     Comment: Social; rare use    Drug use: Yes     Types: Marijuana     Comment: uses infrequently,last used last week    Sexual activity: Yes     Partners: Male     Birth control/protection: See Surgical Hx     Review of Systems   Constitutional:  Positive for fever. Negative for chills and diaphoresis.   HENT:  Negative for trouble swallowing and voice change.    Eyes:  Negative for photophobia and visual disturbance.   Respiratory:  Negative for chest tightness, shortness of breath and wheezing.    Cardiovascular:  Positive for chest pain. Negative for palpitations and leg swelling.   Gastrointestinal:  Positive for diarrhea, nausea and vomiting. Negative for abdominal distention, abdominal pain, blood in stool and constipation.   Genitourinary:  Positive for flank pain. Negative for difficulty urinating, dysuria, hematuria and vaginal discharge.   Musculoskeletal:  Negative for arthralgias, back pain and gait problem.   Skin:  Negative for color change and wound.   Neurological:  Negative for dizziness, speech difficulty, weakness, light-headedness, numbness and headaches.   Psychiatric/Behavioral:  Negative for behavioral problems, confusion and decreased concentration.      Objective:     Vital Signs (Most Recent):  Temp: 99.1 °F (37.3 °C) (10/08/23 2123)  Pulse: 81 (10/08/23 2123)  Resp: 16 (10/08/23 2149)  BP: (!) 174/90 (10/08/23 2149)  SpO2: 98 % (10/08/23 2123) Vital Signs (24h Range):  Temp:  [98.4 °F (36.9 °C)-99.5 °F (37.5 °C)] 99.1 °F (37.3 °C)  Pulse:  [81-87] 81  Resp:  [16-18] 16  SpO2:  [98 %] 98 %  BP: (155-201)/(81-94) 174/90      Weight: 79.4 kg (175 lb)  Body mass index is 28.25 kg/m².     Physical Exam  Vitals and nursing note reviewed.   Constitutional:       General: She is not in acute distress.     Appearance: She is well-developed.   HENT:      Head: Normocephalic and atraumatic.      Mouth/Throat:      Pharynx: No oropharyngeal exudate.   Eyes:      General: No scleral icterus.     Conjunctiva/sclera: Conjunctivae normal.   Cardiovascular:      Rate and Rhythm: Normal rate and regular rhythm.      Heart sounds: Normal heart sounds.   Pulmonary:      Effort: Pulmonary effort is normal. No respiratory distress.      Breath sounds: Normal breath sounds. No wheezing.   Abdominal:      General: Bowel sounds are normal. There is no distension.      Palpations: Abdomen is soft.      Tenderness: There is no abdominal tenderness. There is no guarding or rebound.   Musculoskeletal:         General: Tenderness (R flank) present. Normal range of motion.      Cervical back: Normal range of motion and neck supple.   Lymphadenopathy:      Cervical: No cervical adenopathy.   Skin:     General: Skin is warm and dry.      Capillary Refill: Capillary refill takes less than 2 seconds.      Findings: No rash.   Neurological:      Mental Status: She is alert and oriented to person, place, and time.      Cranial Nerves: No cranial nerve deficit.      Sensory: No sensory deficit.      Coordination: Coordination normal.   Psychiatric:         Behavior: Behavior normal.         Thought Content: Thought content normal.         Judgment: Judgment normal.                Significant Labs: All pertinent labs within the past 24 hours have been reviewed.  CBC:   Recent Labs   Lab 10/08/23  1327 10/08/23  1335   WBC 4.07  --    HGB 13.5  --    HCT 37.9 38     --      CMP:   Recent Labs   Lab 10/08/23  1327      K 3.6      CO2 24   GLU 86   BUN 12   CREATININE 0.9   CALCIUM 9.5   PROT 7.9   ALBUMIN 3.6   BILITOT 0.6   ALKPHOS 51*   AST 92*   ALT  78*   ANIONGAP 13       Significant Imaging: I have reviewed all pertinent imaging results/findings within the past 24 hours.  CT Abdomen Pelvis  Without Contrast  Narrative: EXAMINATION:  CT ABDOMEN PELVIS WITHOUT CONTRAST    CLINICAL HISTORY:  UTI, recurrent/complicated (Female);    TECHNIQUE:  Axial images of the abdomen and pelvis were acquired without the use of IV contrast.  Coronal and sagittal reconstructions were also obtained    COMPARISON:  CT 10/04/2023    FINDINGS:  Heart: Partially visualized heart appears normal in size. No pericardial effusion.    Lungs: Visualized portions of the lungs are clear.    Stomach and duodenum: Unremarkable.    Liver: Upper limits of normal size.  No focal hepatic lesion.    Gallbladder: Unremarkable.    Bile Ducts: No evidence of dilated ducts.    Spleen: Upper limit of normal size.    Pancreas: No mass or peripancreatic fat stranding.    Adrenals: Unremarkable.    Kidneys/ Ureters: Normal in size and location.  Two punctate nonobstructing stones within the right kidney measuring approximately 2 mm (series 601, image 121 and 124).  Mild right pelvicaliectasis.  Slight wall thickening of the right renal pelvis and ureter.  No left hydronephrosis.  Bilateral renal cysts.    Bladder: Mildly distended.  No wall thickening or inflammatory changes.    Reproductive organs: Enlarged lobular uterus likely representing fibroids, similar to prior exam.    Bowel/Mesentery: No evidence of bowel obstruction or inflammation.  Few scattered colonic diverticula.  Moderate volume stool burden throughout the colon.    Peritoneum: Trace pelvic free fluid, likely physiologic.    Lymph nodes: No retroperitoneal lymphadenopathy.    Vasculature: No aneurysm. Mild calcific atherosclerosis.    Abdominal wall:  Unremarkable.    Bones: No acute fracture. No suspicious osseous lesions.  Impression: 1. Mild right pelvicaliectasis.  Slight wall thickening of the right renal pelvis and ureter.   Recommend correlation for urinary tract infection.  2. Few punctate nonobstructing right renal stones.  3. Bilateral renal cysts.  4. Moderate stool burden throughout the colon.  5. Enlarged lobular uterus which may represent fibroids.  6. Additional findings as above.    Electronically signed by resident: Francisca Du  Date:    10/08/2023  Time:    16:03    Electronically signed by: Allan Kennedy  Date:    10/08/2023  Time:    16:47        Assessment/Plan:     * Complicated UTI (urinary tract infection)  - persistent R flank pain despite PO antibiotics for UTI  - afebrile without leukocytosis  - UA noninfectious but suspect masked by recent abx use  - recent UCx growing pan sensitive E.coli  - CT AP shows mild right pelvicaliectasis, slight wall thickening of the right renal pelvis and ureter  - continue IV rocephin 1g q24hrs  - cIVFs overnight  - supportive care    Diarrhea  - ongoing issue since prior to antibiotic use x1 week  - CT w/ concern for constipation-- possibly overflow diarrhea but at risk for c.diff w/ recent abx use  - c.diff test ordered  - start bowel regimen if c.diff negative    Severe episode of recurrent major depressive disorder  - continue celexa 40mg daily     BV (bacterial vaginosis)  - recently diagnosed   - continue PO flagyl for 2 more days to complete course    ROSALINE (obstructive sleep apnea)  - unable to tolerate CPAP in the past 2/2 anxiety    Chest pain  - suspect associated with fever, R flank pain and anxiety  - trop, EKG ordered  - treat UTI as above    Hypertension  - uncontrolled in the setting of pain  - continue valsartan 320mg, amlodipine 10mg, and aldactone 50mg daily   - prn hydralazine       VTE Risk Mitigation (From admission, onward)         Ordered     IP VTE LOW RISK PATIENT  Once         10/08/23 1849     Place sequential compression device  Until discontinued         10/08/23 1849                     On 10/08/2023, patient should be placed in hospital observation  services under my care in collaboration with Dr. Giorgio Peng.      Velia Escoto PA-C  Department of [facility] Medicine  [facility] - Observation 11H

## 2023-10-09 NOTE — ASSESSMENT & PLAN NOTE
- uncontrolled in the setting of pain  - continue valsartan 320mg, amlodipine 10mg, and aldactone 50mg daily   - if continues to be elevated, will d/c amlodipine and start nifedipine  - prn hydralazine

## 2023-10-09 NOTE — ASSESSMENT & PLAN NOTE
- suspect associated with fever, R flank pain and anxiety  - trop, EKG ordered  - treat UTI as above

## 2023-10-09 NOTE — SUBJECTIVE & OBJECTIVE
Interval History: VSS. Tmax 100.9 overnight. K2.8, Mg 1.6. Electrolytes repleted. Patient seen and evaluated by bedside. She states her right sided back pain is less severe than yesterday, rated 6/10 today. She also complains of nausea and suprapubic pain. Denies chest pain, shortness of breath, vomiting, diarrhea, and dysuria. She reports not having diarrhea for the past 2 days. CT revealed moderate stool burden throughout the colon. Started senna-docusate. AST 50, ALT 58 both trending downwards. Continuing IV rocephin.      Review of Systems   Constitutional:  Negative for fever.   Respiratory:  Negative for shortness of breath.    Cardiovascular:  Negative for chest pain and leg swelling.   Gastrointestinal:  Positive for abdominal pain and nausea. Negative for diarrhea and vomiting.   Genitourinary:  Positive for flank pain. Negative for dysuria, frequency and vaginal discharge.   Skin:  Negative for color change and pallor.   Neurological:  Negative for headaches.     Objective:     Vital Signs (Most Recent):  Temp: 98 °F (36.7 °C) (10/09/23 1133)  Pulse: 69 (10/09/23 1133)  Resp: 17 (10/09/23 1210)  BP: (!) 155/84 (10/09/23 1133)  SpO2: 95 % (10/09/23 1133) Vital Signs (24h Range):  Temp:  [98 °F (36.7 °C)-100.9 °F (38.3 °C)] 98 °F (36.7 °C)  Pulse:  [64-93] 69  Resp:  [16-18] 17  SpO2:  [95 %-99 %] 95 %  BP: (148-201)/(78-94) 155/84     Weight: 79.4 kg (175 lb)  Body mass index is 28.25 kg/m².    Intake/Output Summary (Last 24 hours) at 10/9/2023 1247  Last data filed at 10/9/2023 0431  Gross per 24 hour   Intake 2560 ml   Output 650 ml   Net 1910 ml         Physical Exam  HENT:      Head: Normocephalic and atraumatic.      Mouth/Throat:      Mouth: Mucous membranes are moist.      Pharynx: Oropharynx is clear.   Eyes:      Extraocular Movements: Extraocular movements intact.      Pupils: Pupils are equal, round, and reactive to light.   Cardiovascular:      Rate and Rhythm: Normal rate and regular rhythm.       Pulses: Normal pulses.      Heart sounds: Normal heart sounds.   Pulmonary:      Effort: Pulmonary effort is normal. No respiratory distress.      Breath sounds: Normal breath sounds. No wheezing.   Abdominal:      Palpations: Abdomen is soft.      Tenderness: There is abdominal tenderness. There is right CVA tenderness. There is no left CVA tenderness.   Musculoskeletal:         General: No tenderness.      Right lower leg: No edema.      Left lower leg: No edema.   Skin:     General: Skin is warm and dry.      Capillary Refill: Capillary refill takes less than 2 seconds.   Neurological:      General: No focal deficit present.      Mental Status: She is alert and oriented to person, place, and time.             Significant Labs: All pertinent labs within the past 24 hours have been reviewed.  CBC:   Recent Labs   Lab 10/08/23  1327 10/08/23  1335 10/09/23  0746   WBC 4.07  --  4.75   HGB 13.5  --  12.1   HCT 37.9 38 34.2*     --  236     CMP:   Recent Labs   Lab 10/08/23  1327 10/09/23  0746    135*   K 3.6 2.8*    101   CO2 24 25   GLU 86 88   BUN 12 8   CREATININE 0.9 0.8   CALCIUM 9.5 9.0   PROT 7.9 6.4   ALBUMIN 3.6 3.1*   BILITOT 0.6 0.5   ALKPHOS 51* 48*   AST 92* 50*   ALT 78* 58*   ANIONGAP 13 9       Significant Imaging: I have reviewed all pertinent imaging results/findings within the past 24 hours.

## 2023-10-09 NOTE — ASSESSMENT & PLAN NOTE
- persistent R flank pain despite PO antibiotics for UTI  - afebrile without leukocytosis  - UA noninfectious but suspect masked by recent abx use  - recent UCx growing pan sensitive E.coli  - CT AP shows mild right pelvicaliectasis, slight wall thickening of the right renal pelvis and ureter  - continue IV rocephin 1g q24hrs  - cIVFs overnight  - supportive care

## 2023-10-09 NOTE — PLAN OF CARE
Problem: Adult Inpatient Plan of Care  Goal: Plan of Care Review  Outcome: Ongoing, Progressing  Goal: Patient-Specific Goal (Individualized)  Outcome: Ongoing, Progressing  Goal: Absence of Hospital-Acquired Illness or Injury  Outcome: Ongoing, Progressing  Goal: Optimal Comfort and Wellbeing  Outcome: Ongoing, Progressing  Goal: Readiness for Transition of Care  Outcome: Ongoing, Progressing     Problem: Infection  Goal: Absence of Infection Signs and Symptoms  Outcome: Ongoing, Progressing     Problem: UTI (Urinary Tract Infection)  Goal: Improved Infection Symptoms  Outcome: Ongoing, Progressing     Problem: Hypertension Comorbidity  Goal: Blood Pressure in Desired Range  Outcome: Ongoing, Progressing     Problem: Obstructive Sleep Apnea Risk or Actual Comorbidity Management  Goal: Unobstructed Breathing During Sleep  Outcome: Ongoing, Progressing     Problem: Chest Pain  Goal: Resolution of Chest Pain Symptoms  Outcome: Ongoing, Progressing     Problem: Pain Acute  Goal: Acceptable Pain Control and Functional Ability  Outcome: Ongoing, Progressing     Problem: Diarrhea  Goal: Fluid and Electrolyte Balance  Outcome: Ongoing, Progressing   Pt admitted and care plan initiated.Telemetry maintained,NSR. C/o right flank pain relieved with Oxycodone. Continue antibiotics. C-diff isolation implemented.pt instructed on need of a stool sample.pt reports her last stool was yesterday evening.safety precautions implemented.bed in low position.rails up x3.call bell in reach.continue plan of care.

## 2023-10-09 NOTE — NURSING
Nurses Note -- 4 Eyes      10/8/2023   10:40 PM      Skin assessed during: Admit      [x] No Altered Skin Integrity Present    [x]Prevention Measures Documented      [] Yes- Altered Skin Integrity Present or Discovered   [] LDA Added if Not in Epic (Describe Wound)   [] New Altered Skin Integrity was Present on Admit and Documented in LDA   [] Wound Image Taken    Wound Care Consulted? No    Attending Nurse:  Chelsea Salinas RN/Staff Member:  Flor Do

## 2023-10-09 NOTE — ASSESSMENT & PLAN NOTE
- reports daily diarrhea prior to arrival with ~2 episodes/day. prior to antibiotic use x1 week, but reports no BM over past 2 days  - CT w/ concern for constipation-- possibly overflow diarrhea but at risk for c.diff w/ recent abx use  - start bowel regimen

## 2023-10-09 NOTE — ASSESSMENT & PLAN NOTE
- ongoing issue since prior to antibiotic use x1 week  - CT w/ concern for constipation-- possibly overflow diarrhea but at risk for c.diff w/ recent abx use  - c.diff test ordered  - start bowel regimen if c.diff negative

## 2023-10-09 NOTE — SUBJECTIVE & OBJECTIVE
Past Medical History:   Diagnosis Date    Anxiety     Depression     History of acne     Hx of psychiatric care     Hyperlipidemia     Hypertension     Keloid cicatrix     Psychiatric problem     Psychosis     Sleep difficulties     Therapy        Past Surgical History:   Procedure Laterality Date     SECTION, CLASSIC      x 2    Laparoscopic bilateral tubal ligation.      TONSILLECTOMY         Review of patient's allergies indicates:   Allergen Reactions    Ace inhibitors      Other reaction(s): cough       No current facility-administered medications on file prior to encounter.     Current Outpatient Medications on File Prior to Encounter   Medication Sig    amLODIPine (NORVASC) 10 MG tablet TAKE 1 TABLET(10 MG) BY MOUTH EVERY DAY    cephALEXin (KEFLEX) 500 MG capsule Take 1 capsule (500 mg total) by mouth every 12 (twelve) hours. for 7 days    citalopram (CELEXA) 40 MG tablet Take 1 tablet (40 mg total) by mouth once daily.    fluocinonide (LIDEX) 0.05 % external solution APPLY TO AFFECTED AREA ONCE A DAY    ibuprofen (ADVIL,MOTRIN) 600 MG tablet Take 1 tablet (600 mg total) by mouth every 6 (six) hours as needed for Pain.    ketoconazole (NIZORAL) 2 % shampoo APPLY TO THE SCALP 2 TIMES WEEKLY    metroNIDAZOLE (FLAGYL) 500 MG tablet Take 1 tablet (500 mg total) by mouth 3 (three) times daily. for 7 days    multivitamin with minerals tablet Take 1 tablet by mouth once daily.    spironolactone (ALDACTONE) 50 MG tablet Take 1 tablet (50 mg total) by mouth once daily.    tretinoin (RETIN-A) 0.025 % cream APPLY THIN LAYER TO FACE AT BEDTIME    valsartan (DIOVAN) 320 MG tablet TAKE 1 TABLET(320 MG) BY MOUTH EVERY DAY     Family History       Problem Relation (Age of Onset)    Aortic aneurysm Father    Breast cancer Sister (52), Maternal Cousin, Maternal Cousin, Maternal Cousin    Cancer Cousin (50)    Depression Sister    Heart disease Father (46)    Hypertension Father, Mother    Schizophrenia Maternal Uncle,  Cousin    Stroke Father (46)          Tobacco Use    Smoking status: Never    Smokeless tobacco: Never   Substance and Sexual Activity    Alcohol use: Yes     Comment: Social; rare use    Drug use: Yes     Types: Marijuana     Comment: uses infrequently,last used last week    Sexual activity: Yes     Partners: Male     Birth control/protection: See Surgical Hx     Review of Systems   Constitutional:  Positive for fever. Negative for chills and diaphoresis.   HENT:  Negative for trouble swallowing and voice change.    Eyes:  Negative for photophobia and visual disturbance.   Respiratory:  Negative for chest tightness, shortness of breath and wheezing.    Cardiovascular:  Positive for chest pain. Negative for palpitations and leg swelling.   Gastrointestinal:  Positive for diarrhea, nausea and vomiting. Negative for abdominal distention, abdominal pain, blood in stool and constipation.   Genitourinary:  Positive for flank pain. Negative for difficulty urinating, dysuria, hematuria and vaginal discharge.   Musculoskeletal:  Negative for arthralgias, back pain and gait problem.   Skin:  Negative for color change and wound.   Neurological:  Negative for dizziness, speech difficulty, weakness, light-headedness, numbness and headaches.   Psychiatric/Behavioral:  Negative for behavioral problems, confusion and decreased concentration.      Objective:     Vital Signs (Most Recent):  Temp: 99.1 °F (37.3 °C) (10/08/23 2123)  Pulse: 81 (10/08/23 2123)  Resp: 16 (10/08/23 2149)  BP: (!) 174/90 (10/08/23 2149)  SpO2: 98 % (10/08/23 2123) Vital Signs (24h Range):  Temp:  [98.4 °F (36.9 °C)-99.5 °F (37.5 °C)] 99.1 °F (37.3 °C)  Pulse:  [81-87] 81  Resp:  [16-18] 16  SpO2:  [98 %] 98 %  BP: (155-201)/(81-94) 174/90     Weight: 79.4 kg (175 lb)  Body mass index is 28.25 kg/m².     Physical Exam  Vitals and nursing note reviewed.   Constitutional:       General: She is not in acute distress.     Appearance: She is well-developed.    HENT:      Head: Normocephalic and atraumatic.      Mouth/Throat:      Pharynx: No oropharyngeal exudate.   Eyes:      General: No scleral icterus.     Conjunctiva/sclera: Conjunctivae normal.   Cardiovascular:      Rate and Rhythm: Normal rate and regular rhythm.      Heart sounds: Normal heart sounds.   Pulmonary:      Effort: Pulmonary effort is normal. No respiratory distress.      Breath sounds: Normal breath sounds. No wheezing.   Abdominal:      General: Bowel sounds are normal. There is no distension.      Palpations: Abdomen is soft.      Tenderness: There is no abdominal tenderness. There is no guarding or rebound.   Musculoskeletal:         General: Tenderness (R flank) present. Normal range of motion.      Cervical back: Normal range of motion and neck supple.   Lymphadenopathy:      Cervical: No cervical adenopathy.   Skin:     General: Skin is warm and dry.      Capillary Refill: Capillary refill takes less than 2 seconds.      Findings: No rash.   Neurological:      Mental Status: She is alert and oriented to person, place, and time.      Cranial Nerves: No cranial nerve deficit.      Sensory: No sensory deficit.      Coordination: Coordination normal.   Psychiatric:         Behavior: Behavior normal.         Thought Content: Thought content normal.         Judgment: Judgment normal.                Significant Labs: All pertinent labs within the past 24 hours have been reviewed.  CBC:   Recent Labs   Lab 10/08/23  1327 10/08/23  1335   WBC 4.07  --    HGB 13.5  --    HCT 37.9 38     --      CMP:   Recent Labs   Lab 10/08/23  1327      K 3.6      CO2 24   GLU 86   BUN 12   CREATININE 0.9   CALCIUM 9.5   PROT 7.9   ALBUMIN 3.6   BILITOT 0.6   ALKPHOS 51*   AST 92*   ALT 78*   ANIONGAP 13       Significant Imaging: I have reviewed all pertinent imaging results/findings within the past 24 hours.  CT Abdomen Pelvis  Without Contrast  Narrative: EXAMINATION:  CT ABDOMEN PELVIS WITHOUT  CONTRAST    CLINICAL HISTORY:  UTI, recurrent/complicated (Female);    TECHNIQUE:  Axial images of the abdomen and pelvis were acquired without the use of IV contrast.  Coronal and sagittal reconstructions were also obtained    COMPARISON:  CT 10/04/2023    FINDINGS:  Heart: Partially visualized heart appears normal in size. No pericardial effusion.    Lungs: Visualized portions of the lungs are clear.    Stomach and duodenum: Unremarkable.    Liver: Upper limits of normal size.  No focal hepatic lesion.    Gallbladder: Unremarkable.    Bile Ducts: No evidence of dilated ducts.    Spleen: Upper limit of normal size.    Pancreas: No mass or peripancreatic fat stranding.    Adrenals: Unremarkable.    Kidneys/ Ureters: Normal in size and location.  Two punctate nonobstructing stones within the right kidney measuring approximately 2 mm (series 601, image 121 and 124).  Mild right pelvicaliectasis.  Slight wall thickening of the right renal pelvis and ureter.  No left hydronephrosis.  Bilateral renal cysts.    Bladder: Mildly distended.  No wall thickening or inflammatory changes.    Reproductive organs: Enlarged lobular uterus likely representing fibroids, similar to prior exam.    Bowel/Mesentery: No evidence of bowel obstruction or inflammation.  Few scattered colonic diverticula.  Moderate volume stool burden throughout the colon.    Peritoneum: Trace pelvic free fluid, likely physiologic.    Lymph nodes: No retroperitoneal lymphadenopathy.    Vasculature: No aneurysm. Mild calcific atherosclerosis.    Abdominal wall:  Unremarkable.    Bones: No acute fracture. No suspicious osseous lesions.  Impression: 1. Mild right pelvicaliectasis.  Slight wall thickening of the right renal pelvis and ureter.  Recommend correlation for urinary tract infection.  2. Few punctate nonobstructing right renal stones.  3. Bilateral renal cysts.  4. Moderate stool burden throughout the colon.  5. Enlarged lobular uterus which may  represent fibroids.  6. Additional findings as above.    Electronically signed by resident: Francisca Du  Date:    10/08/2023  Time:    16:03    Electronically signed by: Allan Kennedy  Date:    10/08/2023  Time:    16:47

## 2023-10-09 NOTE — HOSPITAL COURSE
Navjot Segovia is a 50 y/o female presenting with right flank pain with associated fever and n/v. She was recently treated with Keflex for UTI, which was changed to macrobid by OBGYN. Also being treated for BV with flagyl. Her urine cx on 10/04 showed pansensitive E. Coli. CT abd/pelvis shows mild right pelvicaliectasis and slight wall thickening of the right renal pelvis and ureter. Repeat UA negative for infection.  Patient complained of chest pain. EKG no acute ischemic changes. Troponin <0.006. Blood culture has no growth to date. Continue IV Ceftriaxone 1g q24. Transitioned to PO Augmentin on discharge. Patient c/o diarrhea prior to admission, but CT showing moderate stool burden. Administered senna-docusate for constipation; continued on discharge with prn miralax. BP consistently elevated. Discontinued amlodipine and started nifedipine 60 mg. BP better controlled on nifedipine. Reviewed plan with patient. She was feeling much improved and R flank pain had resolved. All questions answered. Discharged home with PCP follow up.

## 2023-10-09 NOTE — PLAN OF CARE
10/09/23 1427   Discharge Assessment   Assessment Type Discharge Planning Assessment   Confirmed/corrected address, phone number and insurance Yes   Confirmed Demographics Correct on Facesheet   Source of Information patient   When was your last doctors appointment?   (3 months ago)   Does patient/caregiver understand observation status Yes   Communicated LIDA with patient/caregiver Yes   Reason For Admission Pyelonephritis   People in Home child(parminder), dependent;child(parminder), adult;significant other   Facility Arrived From: home   Do you expect to return to your current living situation? Yes   Do you have help at home or someone to help you manage your care at home? Yes   Who are your caregiver(s) and their phone number(s)? Chester Solis [phone]   Prior to hospitilization cognitive status: Alert/Oriented   Current cognitive status: Alert/Oriented   Walking or Climbing Stairs   (independent)   Dressing/Bathing   (independent)   Home Layout Able to live on 1st floor   Equipment Currently Used at Home none   Readmission within 30 days? No   Patient currently being followed by outpatient case management? No   Do you currently have service(s) that help you manage your care at home? No   Do you take prescription medications? Yes   Do you have prescription coverage? Yes   Coverage Medicaid   Do you have any problems affording any of your prescribed medications? No   Is the patient taking medications as prescribed? yes   Who is going to help you get home at discharge? Enzo Solis   How do you get to doctors appointments? car, drives self;family or friend will provide   Are you on dialysis? No   Do you take coumadin? No   DME Needed Upon Discharge  none   Discharge Plan discussed with: Patient   Transition of Care Barriers None   Discharge Plan A Home;Home with family   Financial Resource Strain   How hard is it for you to pay for the very basics like food, housing, medical care, and heating? Not hard   Housing  Stability   In the last 12 months, was there a time when you were not able to pay the mortgage or rent on time? N   In the last 12 months, was there a time when you did not have a steady place to sleep or slept in a shelter (including now)? N   Transportation Needs   In the past 12 months, has lack of transportation kept you from medical appointments or from getting medications? no   In the past 12 months, has lack of transportation kept you from meetings, work, or from getting things needed for daily living? No   Food Insecurity   Within the past 12 months, you worried that your food would run out before you got the money to buy more. Never true   Within the past 12 months, the food you bought just didn't last and you didn't have money to get more. Never true   Social Connections   Are you , , , , never , or living with a partner? Living with     Patient states she was independent prior to admission. She plan to go home at time of d/c. Patient denies services in the home and DME at home. No needs at this time. Patient live with spouse along with her 17 and 21 year old sons. Leticia Casiano RN

## 2023-10-09 NOTE — PROGRESS NOTES
[facility] [address] [place] [address] Medicine  Progress Note    Patient Name: Navjot Segovia  MRN: [id number]  Patient Class: OP- Observation   Admission Date: 10/8/2023  Length of Stay: 0 days  Attending Physician: Amelia Jansen MD  Primary Care Provider: Henrik Vazquez MD        Subjective:     Principal Problem:Complicated UTI (urinary tract infection)        HPI:  Navjot Segovia is a 48 y.o. female with a PMHx of HTN, HLD, anxiety, depression, recent diagnosis of BV and UTI who presents to [facility] [place], [place] for evaluation of right flank pain. Patient reports dysuria, urinary frequency, foul smelling urine, white watery vaginal discharge, as well as postcoital pain and spotting began about 1 week ago for which she was seen in the ED on 10/4 and found to have UTI and BV. She was discharged home on PO flagyl and Keflex. She reports resolution of dysuria and vaginal discharge, however she developed achy right flank pain with associated nausea and vomiting so her OBGYN switched her antibiotics from keflex to macrobid and metronidazole from oral to vaginal three days ago. She has persistent R flank pain with associated fever to Tmax 102.2 despite freuent use of tylenol and ibuprofen so she presented here for further eval. Also endorses persistent diarrhea with dark watery stools without mucus which has been going on since prior to antibiotic use 1 week ago. Additionally notes left upper chest pain which typically occurs with bouts of pain or fever, currently CP free. Denies chills, constipation, vomiting, hematuria, sweating, vaginal discharge, vaginal itching, weakness, syncope.     ED: hypertensive to /85, vitals otherwise stable. No leukocytosis or electrolyte abnormalities. AST 92, ALT 78. Lactate WNL. UA noninfectious. CT abd/pelvis shows mild right pelvicaliectasis. Slight wall thickening of the right renal pelvis and ureter. Given 2L LR, IV rocephin and flagyl.       Overview/Hospital Course:  Navjot Segovia is a 50 y/o  female presenting with right flank pain with associated fever and n/v. She was recently treated with Keflex for UTI, which was changed to macrobid by OBGYN. Also being treated for BV with flagyl. Her urine cx on 10/04 showed pansensitive E. Coli. CT abd/pelvis shows mild right pelvicaliectasis and slight wall thickening of the right renal pelvis and ureter. Repeat UA negative for infection.  Patient complained of chest pain. EKG no acute ischemic changes. Troponin <0.006. Blood culture has no growth to date. Continue IV Ceftriaxone 1g q24. Plan to transition to PO Augmentin. Patient c/o diarrhea prior to admission, but CT showing moderate stool burden. Administered senna-docusate for constipation. Will discharge when medically ready with PCP follow up.      Interval History: VSS. Tmax 100.9 overnight. K2.8, Mg 1.6. Electrolytes repleted. Patient seen and evaluated by bedside. She states her right sided back pain is less severe than yesterday, rated 6/10 today. She also complains of nausea and suprapubic pain. Denies chest pain, shortness of breath, vomiting, diarrhea, and dysuria. She reports not having diarrhea for the past 2 days. CT revealed moderate stool burden throughout the colon. Started senna-docusate. AST 50, ALT 58 both trending downwards. Continuing IV rocephin.      Review of Systems   Constitutional:  Negative for fever.   Respiratory:  Negative for shortness of breath.    Cardiovascular:  Negative for chest pain and leg swelling.   Gastrointestinal:  Positive for abdominal pain and nausea. Negative for diarrhea and vomiting.   Genitourinary:  Positive for flank pain. Negative for dysuria, frequency and vaginal discharge.   Skin:  Negative for color change and pallor.   Neurological:  Negative for headaches.     Objective:     Vital Signs (Most Recent):  Temp: 98 °F (36.7 °C) (10/09/23 1133)  Pulse: 69 (10/09/23 1133)  Resp: 17 (10/09/23 1210)  BP: (!) 155/84 (10/09/23 1133)  SpO2: 95 % (10/09/23 1133)  Vital Signs (24h Range):  Temp:  [98 °F (36.7 °C)-100.9 °F (38.3 °C)] 98 °F (36.7 °C)  Pulse:  [64-93] 69  Resp:  [16-18] 17  SpO2:  [95 %-99 %] 95 %  BP: (148-201)/(78-94) 155/84     Weight: 79.4 kg (175 lb)  Body mass index is 28.25 kg/m².    Intake/Output Summary (Last 24 hours) at 10/9/2023 1247  Last data filed at 10/9/2023 0431  Gross per 24 hour   Intake 2560 ml   Output 650 ml   Net 1910 ml         Physical Exam  HENT:      Head: Normocephalic and atraumatic.      Mouth/Throat:      Mouth: Mucous membranes are moist.      Pharynx: Oropharynx is clear.   Eyes:      Extraocular Movements: Extraocular movements intact.      Pupils: Pupils are equal, round, and reactive to light.   Cardiovascular:      Rate and Rhythm: Normal rate and regular rhythm.      Pulses: Normal pulses.      Heart sounds: Normal heart sounds.   Pulmonary:      Effort: Pulmonary effort is normal. No respiratory distress.      Breath sounds: Normal breath sounds. No wheezing.   Abdominal:      Palpations: Abdomen is soft.      Tenderness: There is abdominal tenderness. There is right CVA tenderness. There is no left CVA tenderness.   Musculoskeletal:         General: No tenderness.      Right lower leg: No edema.      Left lower leg: No edema.   Skin:     General: Skin is warm and dry.      Capillary Refill: Capillary refill takes less than 2 seconds.   Neurological:      General: No focal deficit present.      Mental Status: She is alert and oriented to person, place, and time.             Significant Labs: All pertinent labs within the past 24 hours have been reviewed.  CBC:   Recent Labs   Lab 10/08/23  1327 10/08/23  1335 10/09/23  0746   WBC 4.07  --  4.75   HGB 13.5  --  12.1   HCT 37.9 38 34.2*     --  236     CMP:   Recent Labs   Lab 10/08/23  1327 10/09/23  0746    135*   K 3.6 2.8*    101   CO2 24 25   GLU 86 88   BUN 12 8   CREATININE 0.9 0.8   CALCIUM 9.5 9.0   PROT 7.9 6.4   ALBUMIN 3.6 3.1*   BILITOT 0.6  0.5   ALKPHOS 51* 48*   AST 92* 50*   ALT 78* 58*   ANIONGAP 13 9       Significant Imaging: I have reviewed all pertinent imaging results/findings within the past 24 hours.      Assessment/Plan:      * Complicated UTI (urinary tract infection)  - persistent R flank pain despite PO antibiotics for UTI (prior to admission used keflex and then transitioned to macrobid)  - afebrile without leukocytosis  - UA noninfectious but suspect masked by recent abx use  - recent UCx growing pan sensitive E.coli  - CT AP shows mild right pelvicaliectasis, slight wall thickening of the right renal pelvis and ureter  - concern for possibly early development of pyelonephritis   - continue IV rocephin 1g q24hrs  - supportive care    Hypertension  - uncontrolled in the setting of pain  - continue valsartan 320mg, amlodipine 10mg, and aldactone 50mg daily   - if continues to be elevated, will d/c amlodipine and start nifedipine  - prn hydralazine     Constipation  - CT abdomen revealed moderate stool burden throughout the colon. Pt states she has not had a bowel movement in the past 2 days.  - started daily senna-docusate    Chest pain  Resolved  - suspect associated with uncontrolled BP  - trop neg x1, EKG without ischemic changes    Hypokalemia  - K2.8 today, repleted  - likely from GI losses with recent n/v and diarrhea  - daily bmp    Diarrhea  - reports daily diarrhea prior to arrival with ~2 episodes/day. prior to antibiotic use x1 week, but reports no BM over past 2 days  - CT w/ concern for constipation-- possibly overflow diarrhea but at risk for c.diff w/ recent abx use  - start bowel regimen     Severe episode of recurrent major depressive disorder  - continue celexa 40mg daily     BV (bacterial vaginosis)  - recently diagnosed   - continue PO flagyl for 2 more days to complete course    ROSALINE (obstructive sleep apnea)  - unable to tolerate CPAP in the past 2/2 anxiety      VTE Risk Mitigation (From admission, onward)          Ordered     IP VTE LOW RISK PATIENT  Once         10/08/23 1849     Place sequential compression device  Until discontinued         10/08/23 1849                Discharge Planning   LIDA: 10/10/2023     Code Status: Full Code   Is the patient medically ready for discharge?: No    Reason for patient still in hospital (select all that apply): Patient trending condition and Treatment  Discharge Plan A: Home, Home with family                  Radha Zuluaga PA-C  Department of Hospital Medicine   [facility] - Observation 11H

## 2023-10-09 NOTE — ASSESSMENT & PLAN NOTE
- CT abdomen revealed moderate stool burden throughout the colon. Pt states she has not had a bowel movement in the past 2 days.  - started daily senna-docusate

## 2023-10-09 NOTE — NURSING TRANSFER
Nursing Transfer Note    Pt arrived from the er via wc accompanied by transporter.arrived on Telemetry, NSR.aaox4.resp even and non labored.c/o right flank pain radiating to right hip region.medicated.bp elevated.will admit pt and allow pt to relax and recheck manually.safety precautions implemented.bed in low position.rails up x3.call bell in reach.will monitor.

## 2023-10-09 NOTE — ASSESSMENT & PLAN NOTE
- persistent R flank pain despite PO antibiotics for UTI (prior to admission used keflex and then transitioned to macrobid)  - afebrile without leukocytosis  - UA noninfectious but suspect masked by recent abx use  - recent UCx growing pan sensitive E.coli  - CT AP shows mild right pelvicaliectasis, slight wall thickening of the right renal pelvis and ureter  - concern for possibly early development of pyelonephritis   - continue IV rocephin 1g q24hrs  - supportive care

## 2023-10-09 NOTE — ASSESSMENT & PLAN NOTE
- uncontrolled in the setting of pain  - continue valsartan 320mg, amlodipine 10mg, and aldactone 50mg daily   - prn hydralazine

## 2023-10-10 ENCOUNTER — TELEPHONE (OUTPATIENT)
Dept: FAMILY MEDICINE | Facility: CLINIC | Age: 48
End: 2023-10-10
Payer: MEDICAID

## 2023-10-10 VITALS
OXYGEN SATURATION: 94 % | BODY MASS INDEX: 28.12 KG/M2 | HEIGHT: 66 IN | SYSTOLIC BLOOD PRESSURE: 158 MMHG | WEIGHT: 175 LBS | RESPIRATION RATE: 16 BRPM | HEART RATE: 90 BPM | DIASTOLIC BLOOD PRESSURE: 84 MMHG | TEMPERATURE: 98 F

## 2023-10-10 LAB
ALBUMIN SERPL BCP-MCNC: 3.3 G/DL (ref 3.5–5.2)
ALP SERPL-CCNC: 48 U/L (ref 55–135)
ALT SERPL W/O P-5'-P-CCNC: 53 U/L (ref 10–44)
ANION GAP SERPL CALC-SCNC: 11 MMOL/L (ref 8–16)
ANION GAP SERPL CALC-SCNC: 14 MMOL/L (ref 8–16)
ANISOCYTOSIS BLD QL SMEAR: SLIGHT
AST SERPL-CCNC: 43 U/L (ref 10–40)
BASOPHILS # BLD AUTO: 0.07 K/UL (ref 0–0.2)
BASOPHILS NFR BLD: 1.4 % (ref 0–1.9)
BILIRUB SERPL-MCNC: 0.4 MG/DL (ref 0.1–1)
BUN SERPL-MCNC: 5 MG/DL (ref 6–20)
BUN SERPL-MCNC: 6 MG/DL (ref 6–20)
CALCIUM SERPL-MCNC: 10 MG/DL (ref 8.7–10.5)
CALCIUM SERPL-MCNC: 9.2 MG/DL (ref 8.7–10.5)
CHLORIDE SERPL-SCNC: 97 MMOL/L (ref 95–110)
CHLORIDE SERPL-SCNC: 97 MMOL/L (ref 95–110)
CO2 SERPL-SCNC: 26 MMOL/L (ref 23–29)
CO2 SERPL-SCNC: 27 MMOL/L (ref 23–29)
CREAT SERPL-MCNC: 0.8 MG/DL (ref 0.5–1.4)
CREAT SERPL-MCNC: 0.9 MG/DL (ref 0.5–1.4)
DIFFERENTIAL METHOD: ABNORMAL
EOSINOPHIL # BLD AUTO: 0 K/UL (ref 0–0.5)
EOSINOPHIL NFR BLD: 0.8 % (ref 0–8)
ERYTHROCYTE [DISTWIDTH] IN BLOOD BY AUTOMATED COUNT: 12.2 % (ref 11.5–14.5)
EST. GFR  (NO RACE VARIABLE): >60 ML/MIN/1.73 M^2
EST. GFR  (NO RACE VARIABLE): >60 ML/MIN/1.73 M^2
GLUCOSE SERPL-MCNC: 108 MG/DL (ref 70–110)
GLUCOSE SERPL-MCNC: 88 MG/DL (ref 70–110)
HCT VFR BLD AUTO: 33.8 % (ref 37–48.5)
HGB BLD-MCNC: 12.3 G/DL (ref 12–16)
HYPOCHROMIA BLD QL SMEAR: ABNORMAL
IMM GRANULOCYTES # BLD AUTO: 0.06 K/UL (ref 0–0.04)
IMM GRANULOCYTES NFR BLD AUTO: 1.2 % (ref 0–0.5)
LYMPHOCYTES # BLD AUTO: 2.1 K/UL (ref 1–4.8)
LYMPHOCYTES NFR BLD: 40.4 % (ref 18–48)
MAGNESIUM SERPL-MCNC: 1.7 MG/DL (ref 1.6–2.6)
MCH RBC QN AUTO: 31.3 PG (ref 27–31)
MCHC RBC AUTO-ENTMCNC: 36.4 G/DL (ref 32–36)
MCV RBC AUTO: 86 FL (ref 82–98)
MONOCYTES # BLD AUTO: 0.8 K/UL (ref 0.3–1)
MONOCYTES NFR BLD: 15.7 % (ref 4–15)
NEUTROPHILS # BLD AUTO: 2.1 K/UL (ref 1.8–7.7)
NEUTROPHILS NFR BLD: 40.5 % (ref 38–73)
NRBC BLD-RTO: 0 /100 WBC
OVALOCYTES BLD QL SMEAR: ABNORMAL
PHOSPHATE SERPL-MCNC: 3.1 MG/DL (ref 2.7–4.5)
PLATELET # BLD AUTO: 290 K/UL (ref 150–450)
PMV BLD AUTO: 10.1 FL (ref 9.2–12.9)
POIKILOCYTOSIS BLD QL SMEAR: SLIGHT
POLYCHROMASIA BLD QL SMEAR: ABNORMAL
POTASSIUM SERPL-SCNC: 2.8 MMOL/L (ref 3.5–5.1)
POTASSIUM SERPL-SCNC: 3.4 MMOL/L (ref 3.5–5.1)
PROT SERPL-MCNC: 7 G/DL (ref 6–8.4)
RBC # BLD AUTO: 3.93 M/UL (ref 4–5.4)
SODIUM SERPL-SCNC: 135 MMOL/L (ref 136–145)
SODIUM SERPL-SCNC: 137 MMOL/L (ref 136–145)
SPHEROCYTES BLD QL SMEAR: ABNORMAL
WBC # BLD AUTO: 5.08 K/UL (ref 3.9–12.7)

## 2023-10-10 PROCEDURE — 84100 ASSAY OF PHOSPHORUS: CPT | Performed by: PHYSICIAN ASSISTANT

## 2023-10-10 PROCEDURE — 99239 HOSP IP/OBS DSCHRG MGMT >30: CPT | Mod: ,,,

## 2023-10-10 PROCEDURE — 80053 COMPREHEN METABOLIC PANEL: CPT | Performed by: PHYSICIAN ASSISTANT

## 2023-10-10 PROCEDURE — G0378 HOSPITAL OBSERVATION PER HR: HCPCS

## 2023-10-10 PROCEDURE — 63600175 PHARM REV CODE 636 W HCPCS: Performed by: PHYSICIAN ASSISTANT

## 2023-10-10 PROCEDURE — 80048 BASIC METABOLIC PNL TOTAL CA: CPT | Mod: XB

## 2023-10-10 PROCEDURE — 99239 PR HOSPITAL DISCHARGE DAY,>30 MIN: ICD-10-PCS | Mod: ,,,

## 2023-10-10 PROCEDURE — 85025 COMPLETE CBC W/AUTO DIFF WBC: CPT | Performed by: PHYSICIAN ASSISTANT

## 2023-10-10 PROCEDURE — 25000003 PHARM REV CODE 250: Performed by: HOSPITALIST

## 2023-10-10 PROCEDURE — 25000003 PHARM REV CODE 250: Performed by: PHYSICIAN ASSISTANT

## 2023-10-10 PROCEDURE — 96376 TX/PRO/DX INJ SAME DRUG ADON: CPT

## 2023-10-10 PROCEDURE — 36415 COLL VENOUS BLD VENIPUNCTURE: CPT

## 2023-10-10 PROCEDURE — 83735 ASSAY OF MAGNESIUM: CPT | Performed by: PHYSICIAN ASSISTANT

## 2023-10-10 PROCEDURE — 36415 COLL VENOUS BLD VENIPUNCTURE: CPT | Performed by: PHYSICIAN ASSISTANT

## 2023-10-10 PROCEDURE — 96365 THER/PROPH/DIAG IV INF INIT: CPT | Mod: 59

## 2023-10-10 PROCEDURE — 25000003 PHARM REV CODE 250

## 2023-10-10 PROCEDURE — 63600175 PHARM REV CODE 636 W HCPCS

## 2023-10-10 RX ORDER — MAGNESIUM SULFATE 1 G/100ML
1 INJECTION INTRAVENOUS ONCE
Status: COMPLETED | OUTPATIENT
Start: 2023-10-10 | End: 2023-10-10

## 2023-10-10 RX ORDER — POTASSIUM CHLORIDE 20 MEQ/1
40 TABLET, EXTENDED RELEASE ORAL ONCE
Status: COMPLETED | OUTPATIENT
Start: 2023-10-10 | End: 2023-10-10

## 2023-10-10 RX ORDER — POLYETHYLENE GLYCOL 3350 17 G/17G
17 POWDER, FOR SOLUTION ORAL DAILY PRN
Qty: 510 G | Refills: 0 | Status: SHIPPED | OUTPATIENT
Start: 2023-10-10 | End: 2023-11-09

## 2023-10-10 RX ORDER — POTASSIUM CHLORIDE 7.45 MG/ML
10 INJECTION INTRAVENOUS
Status: COMPLETED | OUTPATIENT
Start: 2023-10-10 | End: 2023-10-10

## 2023-10-10 RX ORDER — AMOXICILLIN AND CLAVULANATE POTASSIUM 875; 125 MG/1; MG/1
1 TABLET, FILM COATED ORAL EVERY 12 HOURS
Qty: 10 TABLET | Refills: 0 | Status: SHIPPED | OUTPATIENT
Start: 2023-10-10 | End: 2023-10-15

## 2023-10-10 RX ORDER — POTASSIUM CHLORIDE 20 MEQ/1
40 TABLET, EXTENDED RELEASE ORAL
Status: COMPLETED | OUTPATIENT
Start: 2023-10-10 | End: 2023-10-10

## 2023-10-10 RX ORDER — NIFEDIPINE 60 MG/1
60 TABLET, EXTENDED RELEASE ORAL DAILY
Qty: 30 TABLET | Refills: 11 | Status: SHIPPED | OUTPATIENT
Start: 2023-10-11 | End: 2024-10-10

## 2023-10-10 RX ORDER — AMOXICILLIN 250 MG
1 CAPSULE ORAL DAILY
Qty: 30 TABLET | Refills: 0 | Status: SHIPPED | OUTPATIENT
Start: 2023-10-11 | End: 2023-11-10

## 2023-10-10 RX ADMIN — POTASSIUM CHLORIDE 10 MEQ: 7.46 INJECTION, SOLUTION INTRAVENOUS at 11:10

## 2023-10-10 RX ADMIN — POTASSIUM CHLORIDE 40 MEQ: 1500 TABLET, EXTENDED RELEASE ORAL at 03:10

## 2023-10-10 RX ADMIN — SENNOSIDES AND DOCUSATE SODIUM 1 TABLET: 50; 8.6 TABLET ORAL at 08:10

## 2023-10-10 RX ADMIN — VALSARTAN 320 MG: 160 TABLET, FILM COATED ORAL at 08:10

## 2023-10-10 RX ADMIN — POTASSIUM CHLORIDE 40 MEQ: 1500 TABLET, EXTENDED RELEASE ORAL at 12:10

## 2023-10-10 RX ADMIN — POTASSIUM CHLORIDE 10 MEQ: 7.46 INJECTION, SOLUTION INTRAVENOUS at 10:10

## 2023-10-10 RX ADMIN — CITALOPRAM HYDROBROMIDE 40 MG: 20 TABLET ORAL at 08:10

## 2023-10-10 RX ADMIN — MAGNESIUM SULFATE HEPTAHYDRATE 1 G: 500 INJECTION, SOLUTION INTRAMUSCULAR; INTRAVENOUS at 12:10

## 2023-10-10 RX ADMIN — SPIRONOLACTONE 50 MG: 25 TABLET, FILM COATED ORAL at 08:10

## 2023-10-10 RX ADMIN — POTASSIUM CHLORIDE 40 MEQ: 1500 TABLET, EXTENDED RELEASE ORAL at 10:10

## 2023-10-10 RX ADMIN — Medication 1 CAPSULE: at 08:10

## 2023-10-10 RX ADMIN — ONDANSETRON 4 MG: 2 INJECTION INTRAMUSCULAR; INTRAVENOUS at 03:10

## 2023-10-10 RX ADMIN — NIFEDIPINE 60 MG: 30 TABLET, FILM COATED, EXTENDED RELEASE ORAL at 08:10

## 2023-10-10 NOTE — PLAN OF CARE
Problem: Adult Inpatient Plan of Care  Goal: Plan of Care Review  Outcome: Met  Goal: Patient-Specific Goal (Individualized)  Outcome: Met  Goal: Absence of Hospital-Acquired Illness or Injury  Outcome: Met  Goal: Optimal Comfort and Wellbeing  Outcome: Met  Goal: Readiness for Transition of Care  Outcome: Met     Problem: Infection  Goal: Absence of Infection Signs and Symptoms  Outcome: Met     Problem: UTI (Urinary Tract Infection)  Goal: Improved Infection Symptoms  Outcome: Met     Problem: Hypertension Comorbidity  Goal: Blood Pressure in Desired Range  Outcome: Met     Problem: Obstructive Sleep Apnea Risk or Actual Comorbidity Management  Goal: Unobstructed Breathing During Sleep  Outcome: Met     Problem: Chest Pain  Goal: Resolution of Chest Pain Symptoms  Outcome: Met     Problem: Pain Acute  Goal: Acceptable Pain Control and Functional Ability  Outcome: Met     Problem: Diarrhea  Goal: Fluid and Electrolyte Balance  Outcome: Met

## 2023-10-10 NOTE — PLAN OF CARE
Problem: Adult Inpatient Plan of Care  Goal: Plan of Care Review  Outcome: Ongoing, Progressing  Goal: Patient-Specific Goal (Individualized)  Outcome: Ongoing, Progressing  Goal: Optimal Comfort and Wellbeing  Outcome: Ongoing, Progressing  Goal: Readiness for Transition of Care  Outcome: Ongoing, Progressing     Problem: Infection  Goal: Absence of Infection Signs and Symptoms  Outcome: Ongoing, Progressing     Problem: UTI (Urinary Tract Infection)  Goal: Improved Infection Symptoms  Outcome: Ongoing, Progressing     Problem: Hypertension Comorbidity  Goal: Blood Pressure in Desired Range  Outcome: Ongoing, Progressing     Problem: Obstructive Sleep Apnea Risk or Actual Comorbidity Management  Goal: Unobstructed Breathing During Sleep  Outcome: Ongoing, Progressing     Problem: Pain Acute  Goal: Acceptable Pain Control and Functional Ability  Outcome: Ongoing, Progressing     Problem: Diarrhea  Goal: Fluid and Electrolyte Balance  Outcome: Ongoing, Progressing

## 2023-10-10 NOTE — PLAN OF CARE
CM call to schedule hospital PCP f/u appointment. The  will send message to the provider. They will call patient with the appointment information. All information is on her AVS. Leticia Casiano RN

## 2023-10-10 NOTE — TELEPHONE ENCOUNTER
"----- Message from Sheyla Miller sent at 10/10/2023  1:16 PM CDT -----  Regarding: Leticia "[facility]"  .Type:  Sooner Appointment Request    Patient is requesting a sooner appointment.  Patient declined first available appointment listed as well as another facility and provider .  Patient will not accept being placed on the waitlist and is requesting a message be sent to doctor.    Name of Caller: Leticia [facility]"    When is the first available appointment?  Nov 8     Symptoms:  hosp fu     Would the patient rather a call back or a response via [facility]?  Call back     Best Call Back Number:  Please call the pt with info at .[phone]'      "

## 2023-10-10 NOTE — DISCHARGE SUMMARY
[facility] [address] [place] [address] Medicine  Discharge Summary      Patient Name: Navjot Segovia  MRN: [id number]  WALTER: [id number]  Patient Class: OP- Observation  Admission Date: 10/8/2023  Hospital Length of Stay: 0 days  Discharge Date and Time: 10/10/2023  4:26 PM  Attending Physician: Gunjan att. providers found   Discharging Provider: Radha Zuluaga PA-C  Primary Care Provider: Henrik Vazquez MD  [facility] Medicine Team: [facility] [place] HOSP MED E Radha Zuluaga PA-C  Primary Care Team: [facility] [place] HOSP MED E    HPI:   Navjot Segovia is a 48 y.o. female with a PMHx of HTN, HLD, anxiety, depression, recent diagnosis of BV and UTI who presents to [facility] [place] for evaluation of right flank pain. Patient reports dysuria, urinary frequency, foul smelling urine, white watery vaginal discharge, as well as postcoital pain and spotting began about 1 week ago for which she was seen in the ED on 10/4 and found to have UTI and BV. She was discharged home on PO flagyl and Keflex. She reports resolution of dysuria and vaginal discharge, however she developed achy right flank pain with associated nausea and vomiting so her OBGYN switched her antibiotics from keflex to macrobid and metronidazole from oral to vaginal three days ago. She has persistent R flank pain with associated fever to Tmax 102.2 despite freuent use of tylenol and ibuprofen so she presented here for further eval. Also endorses persistent diarrhea with dark watery stools without mucus which has been going on since prior to antibiotic use 1 week ago. Additionally notes left upper chest pain which typically occurs with bouts of pain or fever, currently CP free. Denies chills, constipation, vomiting, hematuria, sweating, vaginal discharge, vaginal itching, weakness, syncope.     ED: hypertensive to /85, vitals otherwise stable. No leukocytosis or electrolyte abnormalities. AST 92, ALT 78. Lactate WNL. UA noninfectious. CT abd/pelvis shows mild right pelvicaliectasis. Slight wall  thickening of the right renal pelvis and ureter. Given 2L LR, IV rocephin and flagyl.       * No surgery found *      Hospital Course:   Navjot Segovia is a 48 y/o female presenting with right flank pain with associated fever and n/v. She was recently treated with Keflex for UTI, which was changed to macrobid by OBGYN. Also being treated for BV with flagyl. Her urine cx on 10/04 showed pansensitive E. Coli. CT abd/pelvis shows mild right pelvicaliectasis and slight wall thickening of the right renal pelvis and ureter. Repeat UA negative for infection.  Patient complained of chest pain. EKG no acute ischemic changes. Troponin <0.006. Blood culture has no growth to date. Continue IV Ceftriaxone 1g q24. Transitioned to PO Augmentin on discharge. Patient c/o diarrhea prior to admission, but CT showing moderate stool burden. Administered senna-docusate for constipation; continued on discharge with prn miralax. BP consistently elevated. Discontinued amlodipine and started nifedipine 60 mg. BP better controlled on nifedipine. Reviewed plan with patient. She was feeling much improved and R flank pain had resolved. All questions answered. Discharged home with PCP follow up.       Goals of Care Treatment Preferences:  Code Status: Full Code      Consults:     No new Assessment & Plan notes have been filed under this hospital service since the last note was generated.  Service: Hospital Medicine    Final Active Diagnoses:    Diagnosis Date Noted POA    PRINCIPAL PROBLEM:  Complicated UTI (urinary tract infection) [N39.0] 10/08/2023 Yes    Hypertension [I10]  Yes    Constipation [K59.00] 10/09/2023 Yes    Chest pain [R07.9] 05/22/2018 Yes    Hypokalemia [E87.6] 10/09/2023 Yes    BV (bacterial vaginosis) [N76.0, B96.89] 10/08/2023 Yes    Severe episode of recurrent major depressive disorder [F33.2] 10/08/2023 Yes    Diarrhea [R19.7] 10/08/2023 Yes    ROSALINE (obstructive sleep apnea) [G47.33]  Yes      Problems Resolved  During this Admission:       Discharged Condition: good    Disposition: Home or Self Care    Follow Up:   Follow-up Information     Henrik Vazquez MD Follow up.    Specialties: Internal Medicine, Wound Care  Why: They will call you for your hospital follow up appointment.  Contact information:  [address]LOAN  [phone]                       Patient Instructions:      Diet Cardiac     Notify your health care provider if you experience any of the following:  temperature >100.4     Notify your health care provider if you experience any of the following:  severe uncontrolled pain     Activity as tolerated       Significant Diagnostic Studies:  Imaging Results          CT Abdomen Pelvis  Without Contrast (Final result)  Result time 10/08/23 16:47:49    Final result by Allan Kennedy MD (10/08/23 16:47:49)                 Impression:      1. Mild right pelvicaliectasis.  Slight wall thickening of the right renal pelvis and ureter.  Recommend correlation for urinary tract infection.  2. Few punctate nonobstructing right renal stones.  3. Bilateral renal cysts.  4. Moderate stool burden throughout the colon.  5. Enlarged lobular uterus which may represent fibroids.  6. Additional findings as above.    Electronically signed by resident: Francisca Du  Date:    10/08/2023  Time:    16:03    Electronically signed by: Allan Kennedy  Date:    10/08/2023  Time:    16:47             Narrative:    EXAMINATION:  CT ABDOMEN PELVIS WITHOUT CONTRAST    CLINICAL HISTORY:  UTI, recurrent/complicated (Female);    TECHNIQUE:  Axial images of the abdomen and pelvis were acquired without the use of IV contrast.  Coronal and sagittal reconstructions were also obtained    COMPARISON:  CT 10/04/2023    FINDINGS:  Heart: Partially visualized heart appears normal in size. No pericardial effusion.    Lungs: Visualized portions of the lungs are clear.    Stomach and duodenum: Unremarkable.    Liver: Upper limits of  normal size.  No focal hepatic lesion.    Gallbladder: Unremarkable.    Bile Ducts: No evidence of dilated ducts.    Spleen: Upper limit of normal size.    Pancreas: No mass or peripancreatic fat stranding.    Adrenals: Unremarkable.    Kidneys/ Ureters: Normal in size and location.  Two punctate nonobstructing stones within the right kidney measuring approximately 2 mm (series 601, image 121 and 124).  Mild right pelvicaliectasis.  Slight wall thickening of the right renal pelvis and ureter.  No left hydronephrosis.  Bilateral renal cysts.    Bladder: Mildly distended.  No wall thickening or inflammatory changes.    Reproductive organs: Enlarged lobular uterus likely representing fibroids, similar to prior exam.    Bowel/Mesentery: No evidence of bowel obstruction or inflammation.  Few scattered colonic diverticula.  Moderate volume stool burden throughout the colon.    Peritoneum: Trace pelvic free fluid, likely physiologic.    Lymph nodes: No retroperitoneal lymphadenopathy.    Vasculature: No aneurysm. Mild calcific atherosclerosis.    Abdominal wall:  Unremarkable.    Bones: No acute fracture. No suspicious osseous lesions.                                  Pending Diagnostic Studies:     None         Medications:  Reconciled Home Medications:      Medication List      START taking these medications    amoxicillin-clavulanate 875-125mg 875-125 mg per tablet  Commonly known as: AUGMENTIN  Take 1 tablet by mouth every 12 (twelve) hours. for 5 days     GAVILAX 17 gram/dose powder  Generic drug: polyethylene glycol  Use cap to measure 17 grams, mix in liquid and take by mouth daily as needed (constipation).     NIFEdipine 60 MG (OSM) 24 hr tablet  Commonly known as: PROCARDIA-XL  Take 1 tablet (60 mg total) by mouth once daily.  Start taking on: October 11, 2023     STOOL SOFTENER-LAXATIVE 8.6-50 mg per tablet  Generic drug: senna-docusate 8.6-50 mg  Take 1 tablet by mouth once daily.  Start taking on: October 11,  2023        CONTINUE taking these medications    citalopram 40 MG tablet  Commonly known as: CeleXA  Take 1 tablet (40 mg total) by mouth once daily.     fluocinonide 0.05 % external solution  Commonly known as: LIDEX  APPLY TO AFFECTED AREA ONCE A DAY     ibuprofen 600 MG tablet  Commonly known as: ADVIL,MOTRIN  Take 1 tablet (600 mg total) by mouth every 6 (six) hours as needed for Pain.     ketoconazole 2 % shampoo  Commonly known as: NIZORAL  APPLY TO THE SCALP 2 TIMES WEEKLY     multivitamin with minerals tablet  Take 1 tablet by mouth once daily.     spironolactone 50 MG tablet  Commonly known as: ALDACTONE  Take 1 tablet (50 mg total) by mouth once daily.     tretinoin 0.025 % cream  Commonly known as: RETIN-A  APPLY THIN LAYER TO FACE AT BEDTIME     valsartan 320 MG tablet  Commonly known as: DIOVAN  TAKE 1 TABLET(320 MG) BY MOUTH EVERY DAY        STOP taking these medications    amLODIPine 10 MG tablet  Commonly known as: NORVASC     cephALEXin 500 MG capsule  Commonly known as: KEFLEX     metroNIDAZOLE 500 MG tablet  Commonly known as: FLAGYL            Indwelling Lines/Drains at time of discharge:   Lines/Drains/Airways     None                 Time spent on the discharge of patient: 35 minutes         Radha Zuluaga PA-C  Department of Hospital Medicine  [facility]   no

## 2023-10-12 NOTE — PLAN OF CARE
[facility]  Discharge Final Note    Primary Care Provider: Henrik Vazquez MD    Expected Discharge Date: 10/10/2023    Patient discharged to home 10/10/23 . Family provided transportation        Final Discharge Note (most recent)       Final Note - 10/12/23 0823          Final Note    Assessment Type Final Discharge Note (P)      Anticipated Discharge Disposition Home or Self Care (P)                      Important Message from Medicare             Contact Info       Henrik Vazquez MD   Specialty: Internal Medicine, Wound Care   Relationship: PCP - General    [facility] [address] [place] [address]   Phone: [phone]       Next Steps: Follow up    Instructions: They will call you for your hospital follow up appointment.            No future appointments.    CM scheduled post-discharge follow-up appointment and information added to AVS.   Leticia Casiano RN

## 2023-10-13 LAB
BACTERIA BLD CULT: NORMAL
BACTERIA BLD CULT: NORMAL

## 2023-11-06 ENCOUNTER — PATIENT MESSAGE (OUTPATIENT)
Dept: ADMINISTRATIVE | Facility: HOSPITAL | Age: 48
End: 2023-11-06
Payer: MEDICAID

## 2024-01-15 PROBLEM — N39.0 COMPLICATED UTI (URINARY TRACT INFECTION): Status: RESOLVED | Noted: 2023-10-08 | Resolved: 2024-01-15

## 2024-03-01 ENCOUNTER — PATIENT MESSAGE (OUTPATIENT)
Dept: ADMINISTRATIVE | Facility: HOSPITAL | Age: 49
End: 2024-03-01
Payer: MEDICAID

## 2024-03-18 ENCOUNTER — OFFICE VISIT (OUTPATIENT)
Dept: OBSTETRICS AND GYNECOLOGY | Facility: CLINIC | Age: 49
End: 2024-03-18
Payer: MEDICAID

## 2024-03-18 VITALS
DIASTOLIC BLOOD PRESSURE: 100 MMHG | WEIGHT: 176.38 LBS | SYSTOLIC BLOOD PRESSURE: 150 MMHG | BODY MASS INDEX: 28.47 KG/M2

## 2024-03-18 DIAGNOSIS — D21.9 FIBROIDS: ICD-10-CM

## 2024-03-18 DIAGNOSIS — I10 PRIMARY HYPERTENSION: ICD-10-CM

## 2024-03-18 DIAGNOSIS — N92.1 MENORRHAGIA WITH IRREGULAR CYCLE: Primary | ICD-10-CM

## 2024-03-18 DIAGNOSIS — N90.7 VULVAR CYST: ICD-10-CM

## 2024-03-18 PROCEDURE — 3008F BODY MASS INDEX DOCD: CPT | Mod: CPTII,,, | Performed by: OBSTETRICS & GYNECOLOGY

## 2024-03-18 PROCEDURE — 56405 I&D VULVA/PERINEAL ABSCESS: CPT | Mod: 59,PBBFAC | Performed by: OBSTETRICS & GYNECOLOGY

## 2024-03-18 PROCEDURE — 3077F SYST BP >= 140 MM HG: CPT | Mod: CPTII,,, | Performed by: OBSTETRICS & GYNECOLOGY

## 2024-03-18 PROCEDURE — 4010F ACE/ARB THERAPY RXD/TAKEN: CPT | Mod: CPTII,,, | Performed by: OBSTETRICS & GYNECOLOGY

## 2024-03-18 PROCEDURE — 1159F MED LIST DOCD IN RCRD: CPT | Mod: CPTII,,, | Performed by: OBSTETRICS & GYNECOLOGY

## 2024-03-18 PROCEDURE — 99999 PR PBB SHADOW E&M-EST. PATIENT-LVL III: CPT | Mod: PBBFAC,,, | Performed by: OBSTETRICS & GYNECOLOGY

## 2024-03-18 PROCEDURE — 56405 I&D VULVA/PERINEAL ABSCESS: CPT | Mod: S$PBB,,, | Performed by: OBSTETRICS & GYNECOLOGY

## 2024-03-18 PROCEDURE — 99213 OFFICE O/P EST LOW 20 MIN: CPT | Mod: PBBFAC | Performed by: OBSTETRICS & GYNECOLOGY

## 2024-03-18 PROCEDURE — 99213 OFFICE O/P EST LOW 20 MIN: CPT | Mod: S$PBB,25,, | Performed by: OBSTETRICS & GYNECOLOGY

## 2024-03-18 PROCEDURE — 3080F DIAST BP >= 90 MM HG: CPT | Mod: CPTII,,, | Performed by: OBSTETRICS & GYNECOLOGY

## 2024-03-19 NOTE — PROGRESS NOTES
Cc:  desires hysterectomy    HPI:  48 yro  who was scheduled to have MISSY on 3/19/24 but was cancelled due to pt's uncontrolled hypertension.  Pt is now seeking second opinion.  Pt has known enlarged uterus due to fibroids.  Pt also has vulvar cyst that pt would like to have addressed.    Pelvic sono:  Uterus: 15.5 x 6.1 x 9.9 cm   Uterus comment: The uterus is markedly enlarged and has an overall heterogeneous appearance secondary to leiomyomatous disease. There is a large leiomyoma within the posterior myometrium measuring 6.9 x 4.5 x 6.0 cm in size. A smaller hypoechoic lesion also consistent with a subserosal leiomyoma measures 2.4 x 1.7 x 2.2 cm in size. A 3rd intramural leiomyoma measures 2.3 x 1.4 x 1.9 cm seen in the fundus of the uterus.     Endometrial stripe measurement: 9.4 mm   Endometrial stripe comment: Not well seen being obscured by the leiomyomatous disease.     Right Ovary: 5.9 x 4.0 x 4.5 cm (55.9 ml)   Right Ovary blood flow: normal   Right Ovary comment: There is a dominant follicular cyst within the ovary measuring 3.7 x 3.2 x 3.5 cm in size.     Left Ovary: 2.8 x 3.0 x 1.7 cm (7.5 ml)   Left Ovary blood flow: normal   Left Ovary comment:     Free fluid: none seen     Past Medical History:   Diagnosis Date    Anxiety     Depression     History of acne     Hx of psychiatric care     Hyperlipidemia     Hypertension     Keloid cicatrix     Psychiatric problem     Psychosis     Sleep difficulties     Therapy      Past Surgical History:   Procedure Laterality Date     SECTION, CLASSIC      x 2    Laparoscopic bilateral tubal ligation.      TONSILLECTOMY       Review of patient's allergies indicates:   Allergen Reactions    Ace inhibitors      Other reaction(s): cough     Vitals:    24 1525   BP: (!) 150/100   Weight: 80 kg (176 lb 5.9 oz)     Physical Exam:   Constitutional: She is oriented to person, place, and time. She appears well-developed and well-nourished. No distress.     HENT:   Head: Normocephalic and atraumatic.     Neck: No thyromegaly present.     Pulmonary/Chest: Effort normal. No respiratory distress.        Abdominal: Soft. She exhibits no distension and no mass. There is no abdominal tenderness. There is no rebound and no guarding.     Genitourinary:    Urethra, bladder, right adnexa and left adnexa normal.            Pelvic exam was performed with patient in the lithotomy position.   No external genitalia lesions identified,Genitalia hair distrobution normal .     Labial bartholins normal.There is lesion on the right labia. There is no rash, tenderness or injury on the right labia. There is no rash, tenderness, lesion or injury on the left labia. Cervix is normal. No no adexnal prolapse or no masses or organomegaly. Right adnexum displays no mass, no tenderness and no fullness. Left adnexum displays no mass, no tenderness and no fullness. Vagina was moist.Cervix exhibits no motion tenderness, no lesion, no discharge, no friability, no tenderness and no polyp. Uterus is enlarged, hosting fibroids, uterine contour abnormal  and uterine consistancy abnormal . Uterus is not deviated, not fixed, not tender and no uterine prolapse. Normal urethral meatus.Urethral Meatus exhibits: urethral lesionUrethra findings: no urethral mass, no tenderness, no urethral scarring and prolapsedBladder findings: no bladder distention and no bladder tenderness   Genitourinary Comments: Female chaperone, Lily Ramachandran MA,  present for entire exam      Procedure:  Sebaceous cyst incised with #11 blade, no local used.  Copious amount of sebaceous material expressed when squeezed.             Musculoskeletal: Normal range of motion and moves all extremeties. No tenderness.       Neurological: She is alert and oriented to person, place, and time. No cranial nerve deficit. Coordination normal.    Skin: Skin is warm. She is not diaphoretic. No erythema.    Psychiatric: She has a normal mood and  affect. Her behavior is normal. Judgment and thought content normal.     Assessment/Plan  1. Menorrhagia with irregular cycle    2. Fibroids    3. Vulvar cyst    4. Primary hypertension      Discussed with pt that she should be a candidate for robotic TLH rather than MISSY.  Pt desires to try this approach.  Pt aware that she must be BP under control and surgery to be scheduled in approx 2 months.

## 2024-05-17 ENCOUNTER — TELEPHONE (OUTPATIENT)
Dept: OBSTETRICS AND GYNECOLOGY | Facility: CLINIC | Age: 49
End: 2024-05-17
Payer: MEDICAID

## 2024-05-17 ENCOUNTER — PATIENT MESSAGE (OUTPATIENT)
Dept: OBSTETRICS AND GYNECOLOGY | Facility: CLINIC | Age: 49
End: 2024-05-17
Payer: MEDICAID

## 2024-05-17 NOTE — TELEPHONE ENCOUNTER
Contacted pt to see if she is available to pre-op on Monday 5/20 around 1:00 pm. No answer. Left vm

## 2024-05-20 ENCOUNTER — OFFICE VISIT (OUTPATIENT)
Dept: OBSTETRICS AND GYNECOLOGY | Facility: CLINIC | Age: 49
End: 2024-05-20
Payer: MEDICAID

## 2024-05-20 VITALS
BODY MASS INDEX: 28.24 KG/M2 | DIASTOLIC BLOOD PRESSURE: 82 MMHG | SYSTOLIC BLOOD PRESSURE: 144 MMHG | WEIGHT: 174.94 LBS

## 2024-05-20 DIAGNOSIS — D21.9 FIBROIDS: ICD-10-CM

## 2024-05-20 DIAGNOSIS — I10 PRIMARY HYPERTENSION: ICD-10-CM

## 2024-05-20 DIAGNOSIS — N92.1 MENORRHAGIA WITH IRREGULAR CYCLE: Primary | ICD-10-CM

## 2024-05-20 DIAGNOSIS — Z01.818 PRE-OP EXAM: ICD-10-CM

## 2024-05-20 PROCEDURE — 99213 OFFICE O/P EST LOW 20 MIN: CPT | Mod: PBBFAC | Performed by: OBSTETRICS & GYNECOLOGY

## 2024-05-20 PROCEDURE — 99499 UNLISTED E&M SERVICE: CPT | Mod: S$PBB,,, | Performed by: OBSTETRICS & GYNECOLOGY

## 2024-05-20 PROCEDURE — 99999 PR PBB SHADOW E&M-EST. PATIENT-LVL III: CPT | Mod: PBBFAC,,, | Performed by: OBSTETRICS & GYNECOLOGY

## 2024-05-20 RX ORDER — NIFEDIPINE 30 MG/1
30 TABLET, EXTENDED RELEASE ORAL DAILY
Qty: 90 TABLET | Refills: 3 | Status: SHIPPED | OUTPATIENT
Start: 2024-05-20 | End: 2025-05-20

## 2024-05-20 RX ORDER — SODIUM CHLORIDE AND POTASSIUM CHLORIDE 150; 900 MG/100ML; MG/100ML
INJECTION, SOLUTION INTRAVENOUS CONTINUOUS
OUTPATIENT
Start: 2024-05-20

## 2024-05-20 RX ORDER — CEFAZOLIN SODIUM 2 G/50ML
2 SOLUTION INTRAVENOUS
OUTPATIENT
Start: 2024-05-20

## 2024-05-20 RX ORDER — FAMOTIDINE 20 MG/1
20 TABLET, FILM COATED ORAL
OUTPATIENT
Start: 2024-05-20

## 2024-05-20 RX ORDER — MUPIROCIN 20 MG/G
OINTMENT TOPICAL
OUTPATIENT
Start: 2024-05-20

## 2024-05-20 NOTE — PROGRESS NOTES
Cc:  pre-op exam    HPI:  49 yr  who is scheduled for rTLH on 24 presents for pre-op exam.  Pt desires to change date of surgery to 24 so pt can attend event with son in [place] on 24.  Pt has known enlarged uterus due to fibroids and heavy bleeding.  Pt had endometrial ablation 2011 which has since failed.  Pt is now seeking definitive treatment.    Pelvic sono:  Uterus: 15.5 x 6.1 x 9.9 cm   Uterus comment: The uterus is markedly enlarged and has an overall heterogeneous appearance secondary to leiomyomatous disease. There is a large leiomyoma within the posterior myometrium measuring 6.9 x 4.5 x 6.0 cm in size. A smaller hypoechoic lesion also consistent with a subserosal leiomyoma measures 2.4 x 1.7 x 2.2 cm in size. A 3rd intramural leiomyoma measures 2.3 x 1.4 x 1.9 cm seen in the fundus of the uterus.     Endometrial stripe measurement: 9.4 mm   Endometrial stripe comment: Not well seen being obscured by the leiomyomatous disease.     Right Ovary: 5.9 x 4.0 x 4.5 cm (55.9 ml)   Right Ovary blood flow: normal   Right Ovary comment: There is a dominant follicular cyst within the ovary measuring 3.7 x 3.2 x 3.5 cm in size.     Left Ovary: 2.8 x 3.0 x 1.7 cm (7.5 ml)   Left Ovary blood flow: normal   Left Ovary comment:     Free fluid: none seen     Impression    1.   The uterus is enlarged secondary to extensive leiomyomatous disease with the largest leiomyoma measuring nearly 7 cm in size. This slightly obscures visualization of the central endometrial complex although a portion of it is seen and appears normal.    2.   Dominant follicular cyst within the right ovary.    Past Medical History:   Diagnosis Date    Anxiety     Depression     History of acne     Hx of psychiatric care     Hyperlipidemia     Hypertension     Keloid cicatrix     Psychiatric problem     Psychosis     Sleep difficulties     Therapy      Past Surgical History:   Procedure Laterality Date     SECTION,  CLASSIC      x 2    Laparoscopic bilateral tubal ligation.      TONSILLECTOMY       Review of patient's allergies indicates:   Allergen Reactions    Ace inhibitors      Other reaction(s): cough     Patient's Medications   New Prescriptions    No medications on file   Previous Medications    FLUOCINONIDE (LIDEX) 0.05 % EXTERNAL SOLUTION    APPLY TO AFFECTED AREA ONCE A DAY    IBUPROFEN (ADVIL,MOTRIN) 600 MG TABLET    Take 1 tablet (600 mg total) by mouth every 6 (six) hours as needed for Pain.    KETOCONAZOLE (NIZORAL) 2 % SHAMPOO    APPLY TO THE SCALP 2 TIMES WEEKLY    MULTIVITAMIN WITH MINERALS TABLET    Take 1 tablet by mouth once daily.    TRETINOIN (RETIN-A) 0.025 % CREAM    APPLY THIN LAYER TO FACE AT BEDTIME    VALSARTAN (DIOVAN) 320 MG TABLET    TAKE 1 TABLET(320 MG) BY MOUTH EVERY DAY   Modified Medications    No medications on file   Discontinued Medications    CITALOPRAM (CELEXA) 40 MG TABLET    Take 1 tablet (40 mg total) by mouth once daily.    NIFEDIPINE (PROCARDIA-XL) 60 MG (OSM) 24 HR TABLET    Take 1 tablet (60 mg total) by mouth once daily.    SPIRONOLACTONE (ALDACTONE) 50 MG TABLET    Take 1 tablet (50 mg total) by mouth once daily.     Family History   Problem Relation Name Age of Onset    Hypertension Father      Heart disease Father  46    Aortic aneurysm Father          abdominal    Stroke Father  46    Hypertension Mother      Depression Sister      Breast cancer Sister  52    Schizophrenia Maternal Uncle      Schizophrenia Cousin      Cancer Cousin  50        cervical vs. ovarian    Breast cancer Maternal Cousin      Breast cancer Maternal Cousin      Breast cancer Maternal Cousin      Ovarian cancer Neg Hx       Social History     Tobacco Use    Smoking status: Never     Passive exposure: Never    Smokeless tobacco: Never   Substance Use Topics    Alcohol use: Yes     Comment: Social; rare use    Drug use: Yes     Types: Marijuana     Comment: uses infrequently,last used last week     Review  of Systems   Constitutional: Negative.    HENT: Negative.     Respiratory: Negative.     Cardiovascular: Negative.    Genitourinary:         Heavy menses   Musculoskeletal: Negative.    Neurological: Negative.    Psychiatric/Behavioral:  Positive for depression. Negative for hallucinations, memory loss, substance abuse and suicidal ideas. The patient is not nervous/anxious and does not have insomnia.      Vitals:    05/20/24 1324   BP: (!) 144/82   Weight: 79.3 kg (174 lb 15 oz)     Physical Exam:   Constitutional: She is oriented to person, place, and time. She appears well-developed and well-nourished. No distress.    HENT:   Head: Normocephalic and atraumatic.       Pulmonary/Chest: Effort normal. No respiratory distress.        Abdominal: Soft. She exhibits no distension and no mass. There is no abdominal tenderness. There is no rebound and no guarding.             Musculoskeletal: Normal range of motion and moves all extremeties. No tenderness.       Neurological: She is alert and oriented to person, place, and time. No cranial nerve deficit. Coordination normal.    Skin: She is not diaphoretic.    Psychiatric: She has a normal mood and affect. Her behavior is normal. Judgment and thought content normal.     Assessment/Plan  1. Menorrhagia with irregular cycle    2. Fibroids    3. Pre-op exam    4. Primary hypertension      Pt given new Rx for procardia xl 30 since pt had been cutting 60 mg in 1/2; however, pill is not scored for splitting  Pt consented for rTLH w/ bilateral salpingectomy; all questions answered and consents signed  Surgery date changed to 6/26/24 per pt request

## 2024-05-21 ENCOUNTER — TELEPHONE (OUTPATIENT)
Dept: SURGERY | Facility: HOSPITAL | Age: 49
End: 2024-05-21
Payer: MEDICAID

## 2024-05-29 DIAGNOSIS — Z12.31 OTHER SCREENING MAMMOGRAM: ICD-10-CM

## 2024-05-30 ENCOUNTER — PATIENT OUTREACH (OUTPATIENT)
Dept: ADMINISTRATIVE | Facility: HOSPITAL | Age: 49
End: 2024-05-30
Payer: MEDICAID

## 2024-06-17 ENCOUNTER — TELEPHONE (OUTPATIENT)
Dept: PREADMISSION TESTING | Facility: HOSPITAL | Age: 49
End: 2024-06-17
Payer: MEDICAID

## 2024-06-17 ENCOUNTER — TELEPHONE (OUTPATIENT)
Dept: OBSTETRICS AND GYNECOLOGY | Facility: CLINIC | Age: 49
End: 2024-06-17
Payer: MEDICAID

## 2024-06-17 DIAGNOSIS — N92.1 MENORRHAGIA WITH IRREGULAR CYCLE: Primary | ICD-10-CM

## 2024-06-17 DIAGNOSIS — N92.1 MENORRHAGIA WITH IRREGULAR CYCLE: ICD-10-CM

## 2024-06-17 RX ORDER — MISOPROSTOL 200 UG/1
200 TABLET ORAL EVERY 6 HOURS
Qty: 2 TABLET | Refills: 0 | Status: SHIPPED | OUTPATIENT
Start: 2024-06-17 | End: 2024-06-17 | Stop reason: SDUPTHER

## 2024-06-17 RX ORDER — MISOPROSTOL 200 UG/1
200 TABLET ORAL EVERY 6 HOURS
Qty: 2 TABLET | Refills: 0 | Status: SHIPPED | OUTPATIENT
Start: 2024-06-17 | End: 2024-06-19

## 2024-06-17 NOTE — TELEPHONE ENCOUNTER
Pt was called and informed that provider will like for her to come into the office for a EMBX. Pt voiced understanding and will come in on 06/18/2024 at 10 am.     ----- Message from Severo Reis MD sent at 6/15/2024  9:50 AM CDT -----  This pt needs EMBx on 6/18/24 please

## 2024-06-17 NOTE — TELEPHONE ENCOUNTER
Pharmacy call was returned. Pharmacy stated they spoke with someone from our clinic.     ----- Message from Elizabeth Crawford sent at 6/17/2024  3:26 PM CDT -----  Type: RX Refill Request    Who Called: terence [phone]    Have you contacted your pharmacy:    Refill or New Rx:miSOPROStoL (CYTOTEC) 200 MCG Tab - cannot fill due to diagnosis. Call pharmacy    RX Name and Strength:    How is the patient currently taking it? (ex. 1XDay):    Is this a 30 day or 90 day RX:    Preferred Pharmacy with phone number:    Local or Mail Order:    Ordering Provider:    Would the patient rather a call back or a response via [facility]?     Best Call Back Number:    Additional Information:

## 2024-06-18 ENCOUNTER — PROCEDURE VISIT (OUTPATIENT)
Dept: OBSTETRICS AND GYNECOLOGY | Facility: CLINIC | Age: 49
End: 2024-06-18
Payer: MEDICAID

## 2024-06-18 VITALS — BODY MASS INDEX: 28.11 KG/M2 | WEIGHT: 174.19 LBS

## 2024-06-18 DIAGNOSIS — N92.1 MENORRHAGIA WITH IRREGULAR CYCLE: Primary | ICD-10-CM

## 2024-06-18 DIAGNOSIS — D21.9 FIBROIDS: ICD-10-CM

## 2024-06-18 PROCEDURE — 58100 BIOPSY OF UTERUS LINING: CPT | Mod: PBBFAC | Performed by: OBSTETRICS & GYNECOLOGY

## 2024-06-18 PROCEDURE — 99499 UNLISTED E&M SERVICE: CPT | Mod: S$PBB,,, | Performed by: OBSTETRICS & GYNECOLOGY

## 2024-06-18 NOTE — PROCEDURES
Endometrial biopsy    Date/Time: 6/18/2024 10:00 AM    Performed by: Severo Reis MD  Authorized by: Severo Reis MD    Consent:     Consent obtained:  Prior to procedure the appropriate consent was completed and verified    Consent given by:  Patient    Patient questions answered: yes      Patient agrees, verbalizes understanding, and wants to proceed: yes      Educational handouts given: no      Instructions and paperwork completed: yes    Indication:     Indications: Menorrhagia    Pre-procedure:     Pre-procedure timeout performed: yes    Procedure:     Procedure: endometrial biopsy with Pipelle      Cervix cleaned and prepped: yes      A paracervical block was performed: no      An intracervical block was performed: no      The cervix was dilated: yes      Uterus sounded: yes      Uterus sound depth (cm):  10    Curettes used:  1    Specimen collected: specimen collected and sent to pathology      Patient tolerated procedure well with no complications: yes    Comments:     Procedure comments:  Treatment based on results

## 2024-06-19 ENCOUNTER — TELEPHONE (OUTPATIENT)
Dept: PREADMISSION TESTING | Facility: HOSPITAL | Age: 49
End: 2024-06-19
Payer: MEDICAID

## 2024-10-14 ENCOUNTER — PATIENT MESSAGE (OUTPATIENT)
Dept: OBSTETRICS AND GYNECOLOGY | Facility: CLINIC | Age: 49
End: 2024-10-14
Payer: MEDICAID

## 2024-10-14 DIAGNOSIS — Z12.39 ENCOUNTER FOR SCREENING FOR MALIGNANT NEOPLASM OF BREAST, UNSPECIFIED SCREENING MODALITY: Primary | ICD-10-CM

## 2024-10-16 ENCOUNTER — HOSPITAL ENCOUNTER (OUTPATIENT)
Dept: RADIOLOGY | Facility: HOSPITAL | Age: 49
Discharge: HOME | End: 2024-10-16
Payer: MEDICAID

## 2024-10-16 DIAGNOSIS — Z12.31 ENCOUNTER FOR SCREENING MAMMOGRAM FOR BREAST CANCER: ICD-10-CM

## 2024-10-16 PROCEDURE — 77067 SCR MAMMO BI INCL CAD: CPT | Mod: TC,PO

## 2024-10-24 ENCOUNTER — TELEPHONE (OUTPATIENT)
Dept: OBSTETRICS AND GYNECOLOGY | Facility: HOSPITAL | Age: 49
End: 2024-10-24
Payer: MEDICAID

## 2024-10-24 ENCOUNTER — PATIENT MESSAGE (OUTPATIENT)
Dept: OBSTETRICS AND GYNECOLOGY | Facility: CLINIC | Age: 49
End: 2024-10-24
Payer: MEDICAID

## 2024-10-24 DIAGNOSIS — R92.8 ABNORMAL MAMMOGRAM OF LEFT BREAST: Primary | ICD-10-CM

## 2024-11-06 ENCOUNTER — HOSPITAL ENCOUNTER (OUTPATIENT)
Dept: RADIOLOGY | Facility: HOSPITAL | Age: 49
Discharge: HOME | End: 2024-11-06
Attending: OBSTETRICS & GYNECOLOGY
Payer: MEDICAID

## 2024-11-06 DIAGNOSIS — R92.8 ABNORMAL MAMMOGRAM OF LEFT BREAST: ICD-10-CM

## 2024-11-06 PROCEDURE — 76642 ULTRASOUND BREAST LIMITED: CPT | Mod: 26,LT,, | Performed by: RADIOLOGY

## 2024-11-06 PROCEDURE — 77065 DX MAMMO INCL CAD UNI: CPT | Mod: TC,LT

## 2024-11-06 PROCEDURE — 77065 DX MAMMO INCL CAD UNI: CPT | Mod: 26,LT,, | Performed by: RADIOLOGY

## 2024-11-06 PROCEDURE — 77061 BREAST TOMOSYNTHESIS UNI: CPT | Mod: 26,LT,, | Performed by: RADIOLOGY

## 2024-11-06 PROCEDURE — 76642 ULTRASOUND BREAST LIMITED: CPT | Mod: TC,LT

## 2024-12-09 NOTE — PROGRESS NOTES
Pt c/o mild dysuria and vaginal yeast infection.  Empiric antibx therapy with Bactrim DS days for UTI.  Increase fluids.  Phenazopyridine (Azo OTC) for bladder discomfort.  UTI precautions.  Diflucan 150 mg po x 1 for yeast infection.  If unresolved, use monistat 7.  Hygiene advice.  Pt is requesting bactrim and diflucan.  Voiced understanding.     Refill sent. She did not have any follow up yet.   Please schedule follow up in 2 weeks .   Open wound of right heel Type 2 diabetes mellitus, with long-term current use of insulin

## 2025-06-04 ENCOUNTER — PATIENT MESSAGE (OUTPATIENT)
Dept: OBSTETRICS AND GYNECOLOGY | Facility: CLINIC | Age: 50
End: 2025-06-04
Payer: MEDICAID

## 2025-06-16 ENCOUNTER — OFFICE VISIT (OUTPATIENT)
Dept: OBSTETRICS AND GYNECOLOGY | Facility: CLINIC | Age: 50
End: 2025-06-16
Payer: MEDICAID

## 2025-06-16 VITALS
DIASTOLIC BLOOD PRESSURE: 82 MMHG | BODY MASS INDEX: 28.45 KG/M2 | WEIGHT: 176.25 LBS | SYSTOLIC BLOOD PRESSURE: 140 MMHG

## 2025-06-16 DIAGNOSIS — N95.1 MENOPAUSAL SYMPTOMS: Primary | ICD-10-CM

## 2025-06-16 DIAGNOSIS — Z01.419 GYNECOLOGIC EXAM NORMAL: ICD-10-CM

## 2025-06-16 DIAGNOSIS — Z12.11 COLON CANCER SCREENING: ICD-10-CM

## 2025-06-16 PROCEDURE — 1159F MED LIST DOCD IN RCRD: CPT | Mod: CPTII,,, | Performed by: OBSTETRICS & GYNECOLOGY

## 2025-06-16 PROCEDURE — 99999 PR PBB SHADOW E&M-EST. PATIENT-LVL III: CPT | Mod: PBBFAC,,, | Performed by: OBSTETRICS & GYNECOLOGY

## 2025-06-16 PROCEDURE — 88175 CYTOPATH C/V AUTO FLUID REDO: CPT | Mod: TC | Performed by: OBSTETRICS & GYNECOLOGY

## 2025-06-16 PROCEDURE — 4010F ACE/ARB THERAPY RXD/TAKEN: CPT | Mod: CPTII,,, | Performed by: OBSTETRICS & GYNECOLOGY

## 2025-06-16 PROCEDURE — 3077F SYST BP >= 140 MM HG: CPT | Mod: CPTII,,, | Performed by: OBSTETRICS & GYNECOLOGY

## 2025-06-16 PROCEDURE — 3008F BODY MASS INDEX DOCD: CPT | Mod: CPTII,,, | Performed by: OBSTETRICS & GYNECOLOGY

## 2025-06-16 PROCEDURE — 99396 PREV VISIT EST AGE 40-64: CPT | Mod: S$PBB,,, | Performed by: OBSTETRICS & GYNECOLOGY

## 2025-06-16 PROCEDURE — 3079F DIAST BP 80-89 MM HG: CPT | Mod: CPTII,,, | Performed by: OBSTETRICS & GYNECOLOGY

## 2025-06-16 PROCEDURE — 99213 OFFICE O/P EST LOW 20 MIN: CPT | Mod: PBBFAC | Performed by: OBSTETRICS & GYNECOLOGY

## 2025-06-16 RX ORDER — ESTRADIOL AND NORETHINDRONE ACETATE 1; .5 MG/1; MG/1
1 TABLET ORAL DAILY
Qty: 30 TABLET | Refills: 0 | Status: SHIPPED | OUTPATIENT
Start: 2025-06-16 | End: 2026-06-16

## 2025-06-16 NOTE — PROGRESS NOTES
Subjective     Patient ID: Navjot Segovia is a 50 y.o. female.    Chief Complaint:  menopause symptoms       History of Present Illness  HPI  Annual Exam-Premenopausal  Patient presents for annual exam. The patient has no complaints today. The patient is sexually active. GYN screening history: last pap: approximate date 2021 and was normal. The patient wears seatbelts: yes. The patient participates in regular exercise: no. Has the patient ever been transfused or tattooed?: not asked. The patient reports that there is not domestic violence in her life.    Pt c/o hot flashes and night sweats.  Pt has skipped some menses but LMP 2025.  Pt states sx's are severe and requesting relief.    GYN & OB History  No LMP recorded. Patient is perimenopausal.   Date of Last Pap: 2021    OB History    Para Term  AB Living   4 3 3  1 3   SAB IAB Ectopic Multiple Live Births   1    3      # Outcome Date GA Lbr Jeet/2nd Weight Sex Type Anes PTL Lv   4 Term 05    M CS-LTranv   JOSE ANTONIO   3 Term 02    M CS-LTranv   JOSE ANTONIO   2 Term 94    F Vag-Spont   JOSE ANTONIO   1 SAB               Obstetric Comments   Gynhx: reg now irregular   S/p BTL   Denies abnl MMG   Denies abnl pap, last pap  neg       Review of Systems  Review of Systems   Constitutional: Negative.    Respiratory: Negative.     Cardiovascular: Negative.    Gastrointestinal:  Positive for constipation.   Endocrine: Positive for hot flashes.   Genitourinary:  Positive for hot flashes, urgency and vaginal dryness. Negative for bladder incontinence, decreased libido, dysmenorrhea, dyspareunia, dysuria, flank pain, frequency, genital sores, hematuria, menorrhagia, menstrual problem, pelvic pain, vaginal bleeding, vaginal discharge, vaginal pain, urinary incontinence, postcoital bleeding, postmenopausal bleeding and vaginal odor.   Musculoskeletal: Negative.    Neurological: Negative.    Psychiatric/Behavioral:  Positive for depression. The patient is  nervous/anxious.    Breast: negative.         Objective   Physical Exam:   Constitutional: She is oriented to person, place, and time. She appears well-developed and well-nourished. No distress.    HENT:   Head: Normocephalic and atraumatic.     Neck: No thyromegaly present.     Pulmonary/Chest: Effort normal. No respiratory distress. Right breast exhibits no inverted nipple, no mass, no nipple discharge, no skin change, no tenderness, presence, no bleeding, no swelling, no mastectomy, no augmentation and no lumpectomy. Left breast exhibits no inverted nipple, no mass, no nipple discharge, no skin change, no tenderness, presence, no bleeding, no swelling, no mastectomy, no augmentation and no lumpectomy. Breasts are symmetrical.        Abdominal: Soft. She exhibits no distension and no mass. There is no abdominal tenderness. There is no rebound and no guarding.     Genitourinary:    Urethra, bladder, vagina, right adnexa and left adnexa normal.      Pelvic exam was performed with patient in the lithotomy position.   The external female genitalia was normal.   No external genitalia lesions identified,Genitalia hair distrobution normal .     Labial bartholins normal.There is no rash, tenderness, lesion or injury on the right labia. There is no rash, tenderness, lesion or injury on the left labia. Cervix is normal. No no adexnal prolapse or no masses or organomegaly. Right adnexum displays no mass, no tenderness and no fullness. Left adnexum displays no mass, no tenderness and no fullness. Vagina exhibits no lesion. No erythema, vaginal discharge, tenderness, bleeding, rectocele, cystocele or prolapse of vaginal walls in the vagina.    No foreign body in the vagina.      No signs of injury in the vagina.   Vagina was moist.Cervix exhibits no motion tenderness, no lesion, no discharge, no friability, no tenderness and no polyp.    pap smear completedUterus is enlarged and hosting fibroids. Uterus is not deviated, not  fixed, not tender and no uterine prolapse. Normal urethral meatus.Urethral Meatus exhibits: no urethral lesionUrethra findings: no urethral mass, no tenderness, no urethral scarring and no prolapsedBladder findings: no bladder distention and no bladder tenderness   Genitourinary Comments: Female chaperone, JINA You,  present for entire exam               Musculoskeletal: Normal range of motion and moves all extremeties. No tenderness.       Neurological: She is alert and oriented to person, place, and time. No cranial nerve deficit. Coordination normal.    Skin: She is not diaphoretic.    Psychiatric: She has a normal mood and affect. Her behavior is normal. Judgment and thought content normal.          Assessment and Plan     1. Menopausal symptoms    2. Gynecologic exam normal    3. Colon cancer screening            Plan:   Pap and HR HPV collected  Up to date with MMG  Cologuard ordered  Rx activella qd; pt to f/u 1 month to check sx improvement and tolerability

## 2025-06-19 LAB
INSULIN SERPL-ACNC: NORMAL U[IU]/ML
LAB AP BETHESDA CATEGORY: NORMAL
LAB AP CLINICAL FINDINGS: NORMAL
LAB AP CONTRACEPTIVES: NORMAL
LAB AP GYN ADDITIONAL FINDINGS: NORMAL
LAB AP OCHS PAP SPECIMEN ADEQUACY: NORMAL
LAB AP OHS PAP INTERPRETATION: NORMAL
LAB AP PAP DISCLAIMER COMMENTS: NORMAL
LAB AP PAP ESTROGEN REPLACEMENT THERAPY: NORMAL
LAB AP PAP PMP: NORMAL
LAB AP PAP PREVIOUS BX: NORMAL
LAB AP PAP PRIOR TREATMENT: NORMAL
LAB AP PERFORMING LOCATION(S): NORMAL

## 2025-06-20 NOTE — PROGRESS NOTES
Patient Education     Well Child Exam 9 to 10 Years   About this topic   Your child's well child exam is a visit with the doctor to check your child's health. The doctor measures your child's weight and height, and may measure your child's body mass index (BMI). The doctor plots these numbers on a growth curve. The growth curve gives a picture of your child's growth at each visit. The doctor may listen to your child's heart, lungs, and belly. Your doctor will do a full exam of your child from the head to the toes.  Your child may also need shots or blood tests during this visit.  General   Growth and Development   Your doctor will ask you how your child is developing. The doctor will focus on the skills that most children your child's age are expected to do. During this time of your child's life, here are some things you can expect.  Movement ? Your child may:  Be getting stronger  Be able to use tools  Be independent when taking a bath or shower  Enjoy team or organized sports  Have better hand-eye coordination  Hearing, seeing, and talking ? Your child will likely:  Have a longer attention span  Be able to memorize facts  Enjoy reading to learn new things  Be able to talk almost at the level of an adult  Feelings and behavior ? Your child will likely:  Be more independent  Work to get better at a skill or school work  Begin to understand the consequences of actions  Start to worry and may rebel  Need encouragement and positive feedback  Want to spend more time with friends instead of family  Feeding ? Your child needs:  3 servings of low-fat or fat-free milk each day  5 servings of fruits and vegetables each day  To start each day with a healthy breakfast  To be given a variety of healthy foods. Many children like to help cook and make food fun.  To limit fruit juice, soda, chips, candy, and foods that are high in sugar and fats  To eat meals as a part of the family. Turn the TV and cell phones off while eating.  See Plan of Care for Initial Evaluation.   Talk about your day, rather than focusing on what your child is eating.  Sleep ? Your child:  Is likely sleeping about 10 hours in a row at night.  Should have a consistent routine before bedtime. Read to, or spend time with, your child each night before bed. When your child is able to read, encourage reading before bedtime as part of a routine.  Needs to brush and floss teeth before going to bed.  Should not have electronic devices like TVs, phones, and tablets on in the bedrooms overnight.  Shots or vaccines ? It is important for your child to get a flu vaccine each year. Your child may need a COVID -19 vaccine. Your child may need other shots as well, either at this visit or their next check up.  Help for Parents   Play.  Encourage your child to spend at least 1 hour each day being physically active.  Offer your child a variety of activities to take part in. Include music, sports, arts and crafts, and other things your child is interested in. Take care not to over schedule your child. One to 2 activities a week outside of school is often a good number for your child.  Make sure your child wears a helmet when using anything with wheels like skates, skateboard, bike, etc.  Encourage time spent playing with friends. Provide a safe area for play.  Read to your child. Have your child read to you.  Here are some things you can do to help keep your child safe and healthy.  Have your child brush the teeth 2 to 3 times each day. Children this age are able to floss teeth as well. Your child should also see a dentist 1 to 2 times each year for a cleaning and checkup.  Talk to your child about the dangers of smoking, drinking alcohol, and using drugs. Do not allow anyone to smoke in your home or around your child.  A booster seat is needed until your child is at least 4 feet 9 inches (145 cm) tall. After that, make sure your child uses a seat belt when riding in the car. Your child should ride in the back seat until 13 years  of age.  Talk with your child about peer pressure. Help your child learn how to handle risky things friends may want to do.  Never leave your child alone. Do not leave your child in the car or at home alone, even for a few minutes.  Protect your child from gun injuries. If you have a gun, use a trigger lock. Keep the gun locked up and the bullets kept in a separate place.  Limit screen time for children to 1 to 2 hours per day. This includes TV, phones, computers, and video games.  Talk about social media safety.  Discuss bike and skateboard safety.  Parents need to think about:  Teaching your child what to do in case of an emergency  Monitoring your childs computer use, especially when on the Internet  Talking to your child about strangers, unwanted touch, and keeping private body parts safe  How to continue to talk about puberty  Having your child help with some family chores to encourage responsibility within the family  The next well child visit will most likely be when your child is 11 years old. At this visit, your doctor may:  Do a full check up on your child  Talk about school, friends, and social skills  Talk about sexuality and sexually transmitted diseases  Give needed vaccines  When do I need to call the doctor?   Fever of 100.4°F (38°C) or higher  Having trouble eating or sleeping  Trouble in school  You are worried about your child's development  Last Reviewed Date   2021-11-04  Consumer Information Use and Disclaimer   This generalized information is a limited summary of diagnosis, treatment, and/or medication information. It is not meant to be comprehensive and should be used as a tool to help the user understand and/or assess potential diagnostic and treatment options. It does NOT include all information about conditions, treatments, medications, side effects, or risks that may apply to a specific patient. It is not intended to be medical advice or a substitute for the medical advice, diagnosis, or  treatment of a health care provider based on the health care provider's examination and assessment of a patients specific and unique circumstances. Patients must speak with a health care provider for complete information about their health, medical questions, and treatment options, including any risks or benefits regarding use of medications. This information does not endorse any treatments or medications as safe, effective, or approved for treating a specific patient. [employer] and its affiliates disclaim any warranty or liability relating to this information or the use thereof. The use of this information is governed by the Terms of Use, available at https://www.[employer].com/en/know/clinical-effectiveness-terms   Copyright   Copyright © 2024 [employer] and its affiliates and/or licensors. All rights reserved.  At 9 years old, children who have outgrown the booster seat may use the adult safety belt fastened correctly.   If you have an active [facility] account, please look for your well child questionnaire to come to your [facility] account before your next well child visit.    ------------------------------------------    Motrin (or tylenol or naproxen) as needed for pain. Start taking meds at earliest onset of headache as they can be harder to treat the longer they last.     Keep a headache diary so you can figure out what triggers your headaches. Avoiding triggers may help you prevent headaches. Record when each headache began, how long it lasted, and what the pain was like (throbbing, aching, stabbing, or dull). Write down any other symptoms you had with the headache, such as nausea, flashing lights or dark spots, or sensitivity to bright light or loud noise. Note if the headache occurred near your period. List anything that might have triggered the headache, such as certain foods (chocolate, cheese, wine) or odors, smoke, bright light, stress, or lack of sleep.      Try to keep your muscles  relaxed by keeping good posture. Check your jaw, face, neck, and shoulder muscles for tension, and try relaxing them. When sitting at a desk, change positions often, and stretch for 30 seconds each hour.      Get plenty of sleep and exercise.  Aim for 30 minutes of exercise/hard play per day.  Children 3-5 years of age should sleep 10-13 hours per day (including naps), 6-12 yr old should sleep 9-12 hours per day, teenagers should sleep 8-10 hours per day.     Eat regularly and well. Avoid meal skipping. Long periods without food can trigger a headache.      Ensure good hydration with several glasses of water per day (8 cups/day if older than age 9 yrs). Aim to drink half your body weight (in pounds) in ounces of water (ie 120 pounds = 60 oz water). More fluids are required during hot weather. Dehydration can cause headaches.     Limit caffeine by not drinking too much coffee, tea, or soda. But don't quit caffeine suddenly, because that can also give you headaches.      Reduce eyestrain from computers by blinking frequently and looking away from the computer screen every so often. Make sure you have proper eyewear and that your monitor is set up properly, about an arm's length away.

## 2025-07-15 ENCOUNTER — OFFICE VISIT (OUTPATIENT)
Dept: OBSTETRICS AND GYNECOLOGY | Facility: CLINIC | Age: 50
End: 2025-07-15
Payer: MEDICAID

## 2025-07-15 VITALS
SYSTOLIC BLOOD PRESSURE: 128 MMHG | BODY MASS INDEX: 27.99 KG/M2 | DIASTOLIC BLOOD PRESSURE: 80 MMHG | HEIGHT: 66 IN | WEIGHT: 174.19 LBS

## 2025-07-15 DIAGNOSIS — N95.1 MENOPAUSAL SYMPTOMS: Primary | ICD-10-CM

## 2025-07-15 PROCEDURE — 1159F MED LIST DOCD IN RCRD: CPT | Mod: CPTII,,, | Performed by: OBSTETRICS & GYNECOLOGY

## 2025-07-15 PROCEDURE — 3079F DIAST BP 80-89 MM HG: CPT | Mod: CPTII,,, | Performed by: OBSTETRICS & GYNECOLOGY

## 2025-07-15 PROCEDURE — 4010F ACE/ARB THERAPY RXD/TAKEN: CPT | Mod: CPTII,,, | Performed by: OBSTETRICS & GYNECOLOGY

## 2025-07-15 PROCEDURE — 99999 PR PBB SHADOW E&M-EST. PATIENT-LVL III: CPT | Mod: PBBFAC,,, | Performed by: OBSTETRICS & GYNECOLOGY

## 2025-07-15 PROCEDURE — 99213 OFFICE O/P EST LOW 20 MIN: CPT | Mod: PBBFAC | Performed by: OBSTETRICS & GYNECOLOGY

## 2025-07-15 PROCEDURE — 1160F RVW MEDS BY RX/DR IN RCRD: CPT | Mod: CPTII,,, | Performed by: OBSTETRICS & GYNECOLOGY

## 2025-07-15 PROCEDURE — 3074F SYST BP LT 130 MM HG: CPT | Mod: CPTII,,, | Performed by: OBSTETRICS & GYNECOLOGY

## 2025-07-15 PROCEDURE — 3008F BODY MASS INDEX DOCD: CPT | Mod: CPTII,,, | Performed by: OBSTETRICS & GYNECOLOGY

## 2025-07-15 PROCEDURE — 99213 OFFICE O/P EST LOW 20 MIN: CPT | Mod: S$PBB,,, | Performed by: OBSTETRICS & GYNECOLOGY

## 2025-07-15 RX ORDER — NIFEDIPINE 60 MG/1
60 TABLET, EXTENDED RELEASE ORAL
COMMUNITY

## 2025-07-15 RX ORDER — ESTRADIOL AND NORETHINDRONE ACETATE .5; .1 MG/1; MG/1
1 TABLET ORAL DAILY
Qty: 30 TABLET | Refills: 3 | Status: SHIPPED | OUTPATIENT
Start: 2025-07-15 | End: 2026-07-15

## 2025-07-16 NOTE — PROGRESS NOTES
Subjective     Patient ID: Navjot Segovia is a 50 y.o. female.    Chief Complaint:  Follow-up (Follow up HRT given.  Pt still with hot flashes)      History of Present Illness  HPI  Patient comes in today for follow-up  Seen on 25 by Dr. Reis for menopausal symptoms.  Placed on HRT with Activella 1/0.5 mg.  Now hot flashes seem to be better. But still bothersome.    Would like to know if there are other options    FSH/E2 on 24 were 83.3/19    GYN & OB History  No LMP recorded. Patient is perimenopausal.   Date of Last Pap: 2025    OB History    Para Term  AB Living   4 3 3  1 3   SAB IAB Ectopic Multiple Live Births   1    3      # Outcome Date GA Lbr Jeet/2nd Weight Sex Type Anes PTL Lv   4 Term 05    M CS-LTranv   JOSE ANTONIO   3 Term 02    M CS-LTranv   JOSE ANTONIO   2 Term 94    F Vag-Spont   JOSE ANTONIO   1 SAB               Obstetric Comments   Gynhx: reg now irregular   S/p BTL   Denies abnl MMG   Denies abnl pap, last pap  neg       Review of Systems  Review of Systems   Constitutional:  Negative for activity change, appetite change, chills, fatigue, fever and unexpected weight change.   HENT:  Negative for mouth sores.    Respiratory:  Negative for cough, shortness of breath and wheezing.    Cardiovascular:  Negative for chest pain and palpitations.   Gastrointestinal:  Negative for abdominal pain, bloating, blood in stool, constipation, nausea and vomiting.   Endocrine: Positive for hot flashes. Negative for diabetes.   Genitourinary:  Positive for hot flashes. Negative for dysmenorrhea, dyspareunia, dysuria, frequency, hematuria, menorrhagia, menstrual problem, pelvic pain, urgency, vaginal bleeding, vaginal discharge, vaginal pain, urinary incontinence, postcoital bleeding and vaginal odor.   Musculoskeletal:  Negative for back pain and myalgias.   Integumentary:  Negative for rash, breast mass and nipple discharge.   Neurological:  Negative for seizures and headaches.    Psychiatric/Behavioral:  Negative for depression and sleep disturbance. The patient is not nervous/anxious.    Breast: Negative for mass, mastodynia and nipple discharge         Objective   Physical Exam:   Constitutional: She appears well-developed and well-nourished. No distress.    HENT:   Head: Normocephalic and atraumatic.    Eyes: EOM are normal.     Cardiovascular:  Normal rate.             Pulmonary/Chest: Effort normal. No respiratory distress.                  Musculoskeletal: Normal range of motion.       Neurological: She is alert.    Skin: Skin is warm and dry.    Psychiatric: She has a normal mood and affect.            Assessment and Plan     1. Menopausal symptoms           Plan:  I have discussed with the patient regarding her condition.  We discussed menopause, possible treatment/management plans and rationale behind many of these options  Medications offered including increasing HRT dosage, Clonidine, SSRIs...  After extensive discussion, she would like to increase Activella.  We will add a smaller dose of Activella  Back in about 1-2 months for follow-up       Total time: 35 min

## 2025-07-21 ENCOUNTER — PATIENT MESSAGE (OUTPATIENT)
Dept: OBSTETRICS AND GYNECOLOGY | Facility: CLINIC | Age: 50
End: 2025-07-21
Payer: MEDICAID

## 2025-07-21 DIAGNOSIS — N95.1 MENOPAUSAL SYMPTOMS: Primary | ICD-10-CM

## 2025-07-21 RX ORDER — ESTRADIOL AND NORETHINDRONE ACETATE 1; .5 MG/1; MG/1
1 TABLET ORAL DAILY
Qty: 30 TABLET | Refills: 6 | Status: SHIPPED | OUTPATIENT
Start: 2025-07-21 | End: 2026-07-21

## 2025-08-13 ENCOUNTER — TELEPHONE (OUTPATIENT)
Dept: OBSTETRICS AND GYNECOLOGY | Facility: CLINIC | Age: 50
End: 2025-08-13
Payer: MEDICAID

## 2025-08-13 DIAGNOSIS — N95.1 MENOPAUSAL SYMPTOMS: Primary | ICD-10-CM

## 2025-08-13 DIAGNOSIS — N95.1 HOT FLASHES DUE TO MENOPAUSE: ICD-10-CM

## 2025-08-13 RX ORDER — FLUOXETINE 20 MG/1
20 CAPSULE ORAL DAILY
Qty: 30 CAPSULE | Refills: 2 | Status: SHIPPED | OUTPATIENT
Start: 2025-08-13 | End: 2026-08-13